# Patient Record
Sex: FEMALE | Race: WHITE | NOT HISPANIC OR LATINO | Employment: OTHER | URBAN - METROPOLITAN AREA
[De-identification: names, ages, dates, MRNs, and addresses within clinical notes are randomized per-mention and may not be internally consistent; named-entity substitution may affect disease eponyms.]

---

## 2017-01-05 DIAGNOSIS — E83.42 HYPOMAGNESEMIA: ICD-10-CM

## 2017-01-05 RX ORDER — LANOLIN ALCOHOL/MO/W.PET/CERES
400 CREAM (GRAM) TOPICAL 2 TIMES DAILY
Qty: 60 TABLET | Refills: 11 | Status: SHIPPED | OUTPATIENT
Start: 2017-01-05 | End: 2018-01-05

## 2017-01-05 NOTE — TELEPHONE ENCOUNTER
----- Message from Sharri Cabral MD sent at 1/4/2017  7:49 PM CST -----  Results reviewed and the following message sent to patient via [facility]: Your magnesium level is low; I recommend you restart magnesium oxide 400 mg twice daily. Your kidney #s are good. Note that fasting sugars are running over 100. They do not meet diagnosis of diabetes, but you are getting close-I recommend you see your PCP and have hemoglobin A1C followed, plus get some diet/weight loss & exercise instructions to avoid becoming diabetic.  I'll copy Dr. Fuchs so he is aware of these labs and issues.

## 2017-01-10 DIAGNOSIS — Z94.0 KIDNEY REPLACED BY TRANSPLANT: ICD-10-CM

## 2017-01-10 RX ORDER — MYCOPHENOLIC ACID 180 MG/1
180 TABLET, DELAYED RELEASE ORAL 2 TIMES DAILY
Qty: 180 TABLET | Refills: 3 | Status: SHIPPED | OUTPATIENT
Start: 2017-01-10 | End: 2017-01-13 | Stop reason: SDUPTHER

## 2017-01-10 RX ORDER — TACROLIMUS 1 MG/1
1 CAPSULE ORAL EVERY 12 HOURS
Qty: 180 CAPSULE | Refills: 3 | Status: SHIPPED | OUTPATIENT
Start: 2017-01-10 | End: 2017-01-13 | Stop reason: SDUPTHER

## 2017-01-13 ENCOUNTER — OFFICE VISIT (OUTPATIENT)
Dept: TRANSPLANT | Facility: CLINIC | Age: 62
End: 2017-01-13
Payer: MEDICARE

## 2017-01-13 VITALS
BODY MASS INDEX: 37.42 KG/M2 | OXYGEN SATURATION: 97 % | TEMPERATURE: 98 F | SYSTOLIC BLOOD PRESSURE: 130 MMHG | HEIGHT: 59 IN | DIASTOLIC BLOOD PRESSURE: 80 MMHG | HEART RATE: 80 BPM | RESPIRATION RATE: 16 BRPM | WEIGHT: 185.63 LBS

## 2017-01-13 DIAGNOSIS — Z94.0 STATUS POST DECEASED-DONOR KIDNEY TRANSPLANTATION: Chronic | ICD-10-CM

## 2017-01-13 DIAGNOSIS — I12.9 RENAL HYPERTENSION, STAGE 1-4 OR UNSPECIFIED CHRONIC KIDNEY DISEASE: Chronic | ICD-10-CM

## 2017-01-13 DIAGNOSIS — N25.81 SECONDARY HYPERPARATHYROIDISM OF RENAL ORIGIN: Chronic | ICD-10-CM

## 2017-01-13 DIAGNOSIS — N18.30 CKD (CHRONIC KIDNEY DISEASE) STAGE 3, GFR 30-59 ML/MIN: Primary | Chronic | ICD-10-CM

## 2017-01-13 DIAGNOSIS — Z29.89 NEED FOR PROPHYLACTIC IMMUNOTHERAPY: Chronic | ICD-10-CM

## 2017-01-13 DIAGNOSIS — E83.42 HYPOMAGNESEMIA: Chronic | ICD-10-CM

## 2017-01-13 DIAGNOSIS — K52.9 CHRONIC DIARRHEA: Chronic | ICD-10-CM

## 2017-01-13 DIAGNOSIS — Z94.0 KIDNEY REPLACED BY TRANSPLANT: ICD-10-CM

## 2017-01-13 PROCEDURE — 97802 MEDICAL NUTRITION INDIV IN: CPT | Mod: PBBFAC | Performed by: DIETITIAN, REGISTERED

## 2017-01-13 PROCEDURE — 99999 PR PBB SHADOW E&M-EST. PATIENT-LVL III: CPT | Mod: PBBFAC,,, | Performed by: INTERNAL MEDICINE

## 2017-01-13 PROCEDURE — 99213 OFFICE O/P EST LOW 20 MIN: CPT | Mod: PBBFAC | Performed by: INTERNAL MEDICINE

## 2017-01-13 PROCEDURE — 99215 OFFICE O/P EST HI 40 MIN: CPT | Mod: S$PBB,,, | Performed by: INTERNAL MEDICINE

## 2017-01-13 RX ORDER — MYCOPHENOLIC ACID 180 MG/1
180 TABLET, DELAYED RELEASE ORAL 2 TIMES DAILY
Qty: 60 TABLET | Refills: 11 | Status: SHIPPED | OUTPATIENT
Start: 2017-01-13 | End: 2017-01-17 | Stop reason: SDUPTHER

## 2017-01-13 RX ORDER — TACROLIMUS 1 MG/1
1 CAPSULE ORAL EVERY 12 HOURS
Qty: 90 CAPSULE | Refills: 11 | Status: SHIPPED | OUTPATIENT
Start: 2017-01-13 | End: 2017-05-12 | Stop reason: SDUPTHER

## 2017-01-13 NOTE — PROGRESS NOTES
Kidney Post-Transplant Assessment    Referring Physician: Nicolas Bustillo  Current Nephrologist: Nicolas Bustillo    ORGAN: RIGHT KIDNEY  Donor Type:  - brain death  PHS Increased Risk: no  Cold Ischemia: 348 mins  Induction Medications: campath - alemtuzumab (anti-cd52)    Subjective:     CC:  Reassessment of renal allograft function and management of chronic immunosuppression.    HPI:  Ms. Vicente is a 61 y.o. year old White female who received a  - brain death kidney transplant on 1/16/15.  She has CKD stage 3 - GFR 30-59 and her baseline creatinine is between 1.2-1.7. She takes mycophenolic acid and tacrolimus for maintenance immunosuppression. She denies any recent hospitalizations or ER visits since her previous clinic visit.    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 She also chronic diarrhea for which she takes Imodium every few days with good results.     Claudette is concerned about her weight gainAnd foot pain from bone spurs which is significantly limiting her activity.  She was recently seen by podiatry who prescribedsome by mouth steroids for several days.  She is questioning if she can have a steroid injection, which I told her was fine  She is concerned about the fact that she has been recently on steroids and not walking much, and has been off vitamin D She tolerated the supplement well, although she has a vague history of ALLERGY to ergocalciferol predating her transplant. She recently saw Dr. MAURIZIO Fuchs to establish nephrology care near home.     She denies any kidney-related complaints, such as pain over allograft, flank pain, dysuria, frequency, or other LUTS.     Review of Systems   Constitutional: Positive for unexpected weight change (from inactivity associated with painful feet). Negative for fever.   HENT: Negative for mouth sores.    Eyes: Negative for visual disturbance.   Respiratory: Negative for shortness of breath.    Cardiovascular: Negative for chest pain and  "leg swelling.   Gastrointestinal: Negative.    Genitourinary: Negative for decreased urine volume, difficulty urinating, dysuria and hematuria.   Musculoskeletal: Positive for gait problem (Related to foot pain).   Skin: Negative for rash.   Allergic/Immunologic: Positive for immunocompromised state.   Neurological: Negative for tremors.     Objective:     Blood pressure 130/80, pulse 80, temperature 98 °F (36.7 °C), temperature source Oral, resp. rate 16, height 4' 10.5" (1.486 m), weight 84.2 kg (185 lb 10 oz), SpO2 97 %.body mass index is 38.14 kg/(m^2).    Physical Exam  A&O x3, NAD.  No icterus, conjuctiva and lids normal w/o injection.  Skin warm and dry to touch.  No rashes noted on exposed skin.  Lungs CTA, unlabored.  Heart regular, no rub.  Abdomen soft, nontender, no masses.  Extremities w/o cyanosis or edema.     Labs:  Lab Results   Component Value Date    WBC 12.00 2017    HGB 12.3 2017    HCT 37.5 2017     2017    K 4.4 2017     2017    CO2 26 2017    BUN 22 2017    CREATININE 1.1 2017    EGFRNONAA 53.6 (A) 2017    CALCIUM 9.2 2017    PHOS 3.9 2017    MG 1.3 (L) 2017    ALBUMIN 3.9 2017    ALBUMIN 3.9 2017    AST 20 2017    ALT 19 2017    UTPCR 0.05 2017    .0 (H) 2016    TACROLIMUS 6.8 2017   Labs were reviewed with the patient.    Assessment:     1. CKD (chronic kidney disease) stage 3, GFR 30-59 ml/min    2. Status post -donor kidney transplant for PKD - 1/16/15    3. Chronic immunosuppression with Prograf and MMF    4. Renal hypertension, stage 1-4 or unspecified chronic kidney disease    5. Secondary hyperparathyroidism of renal origin    6. Chronic diarrhea    7. Hypomagnesemia    8. Kidney replaced by transplant      Plan:     Allograft function assessment   Lab Results   Component Value Date    CREATININE 1.1 2017   CKD3 s/p kidney " transplantation. Continue to monitor renal function and electrolytes, HTN, secondary hyperparathyroidism and other issues related to underlying ESRD.     Immunosuppression evaluation  Continue tacrolimus-based immunosuppression.  Will continue to watch signs, symptoms and levels for side effects and drug toxicity. She shall continue Myfortic without change as well.    Evaluation for bone marrow suppression (r/t immunosuppression toxicity or infection)- Mild leukocytosis noted compared to previous labs, likely from the recent steroids.  No evidence of infection.  Other counts are very stable and require no intervention either.  Lab Results   Component Value Date    WBC 12.00 01/04/2017    HGB 12.3 01/04/2017    HCT 37.5 01/04/2017     01/04/2017     Renal hypertension  BP management within target range. No med changes required.    Metabolic bone disease [Secondary hyperparathyroidism, Phosphorus metabolism disorders]  Lab Results   Component Value Date    .0 (H) 06/08/2016    CALCIUM 9.2 01/04/2017    PHOS 3.9 01/04/2017   Labs are very reasonable, being followed by Dr. Fuchs as well    Assessment of electrolytes  Lab Results   Component Value Date     01/04/2017    K 4.4 01/04/2017    CO2 26 01/04/2017     01/04/2017    MG 1.3 (L) 01/04/2017   Hypomagnesemia noted since stopping magnesium supplement, she hasnow restarted.    Screening for BK infection to prevent allograft dysfunction - Remains undetectable  Lab Results   Component Value Date    BKVIRUSDNAUR Not detected 10/05/2015    BKQUANTURINE <250 10/05/2015    BKVIRUSLOG <2.40 10/05/2015    BKVIRUSURINE Urine 02/16/2015    BKVIRUSPCRQB <250 01/04/2017   Continue blood PCR screening as per program guidelines to prevent BK viremia and nephropathy leading to allograft dysfunction and potential graft failure    Screening for proteinuria - Negligible, continue to watch  Lab Results   Component Value Date    PROTEINURINE 8 01/04/2017     CREATRANDUR 164.5 01/04/2017    UTPCR 0.05 01/04/2017       Some mild hyperglycemia noted likely related to her inactivity resulting in weight gain.  This requires close monitoring.     Follow-up:   Clinic: return to transplant clinic weekly for the first month after transplant; every 2 weeks during months 2-3; then at 6-, 9-, 12-, 18-, 24-, and 36- months post-transplant to reassess for complications from immunosuppression toxicity and monitor for rejection.  Annually thereafter.    Labs: since patient remains at high risk for rejection and drug-related complications that warrant close monitoring, labs will be ordered as follows: continue twice weekly CBC, renal panel, and drug level for first month; then same labs once weekly through 3rd month post-transplant.  Urine for UA and protein/creatinine ratio monthly.  Urine BK - PCR at 1-, 3-, 6-, 9-, 12-, 18-, 24-, and 36- months post-transplant.  Hepatic panel at 1-, 2-, 3-, 6-, 9-, 12-, 18-, 24-, and 36- months post-transplant.    Sharri Cabral MD       Education:   Material provided to the patient.  Patient reminded to call with any health changes since these can be early signs of significant complications.  Also, I advised the patient to be sure any new medications or changes of old medications are discussed with either a pharmacist or physician knowledgeable with transplant to avoid rejection/drug toxicity related to significant drug interactions.    UNOS Patient Status  Functional Status: 60% - Requires occasional assistance but is able to care for needs  Physical Capacity: Limited Mobility

## 2017-01-13 NOTE — Clinical Note
Romulo, I did not see mildly elevated glucose readings until after she left, but suspect this is related to her weight gain and recent steroid use.  Please ask her to follow-up with the primary care physician.   Will cc Dr. Fuchs as KARAN

## 2017-01-13 NOTE — LETTER
January 13, 2017        Nicolas Bustillo  [address] [facility] [address]  Phone: [phone]  Fax: [phone]             [facility]  [address]  [facility] [address]  Phone: [phone]   Patient: Claudette L Voss   MR Number: [id number]   YOB: 1955   Date of Visit: 1/13/2017       Dear Dr. Nicolas Bustillo    Thank you for referring Claudette L Voss to me for evaluation. Attached you will find relevant portions of my assessment and plan of care.    If you have questions, please do not hesitate to call me. I look forward to following Claudette L Voss along with you.    Sincerely,    Sharri Cabral MD    Enclosure    If you would like to receive this communication electronically, please contact [email or web address] or [phone] to request [employer] access.    [employer] is a tool which provides read-only access to select patient information with whom you have a relationship. Its easy to use and provides real time access to review your patients record including encounter summaries, notes, results, and demographic information.    If you feel you have received this communication in error or would no longer like to receive these types of communications, please e-mail [email or web address]

## 2017-01-13 NOTE — PROGRESS NOTES
REFERRING PROVIDER: Sharri Sheridan MD    REASON FOR VIST: Low magnesium level    PAST MEDICAL HX: s/p KTx 1/16/15    LABS: 1/4/17 mag 1.3    NUTRITION HX/INTERVENTION:   -Magnesium content of food list provided & reviewed w/ pt; encouraged to increase po intake of high magnesium foods and continue to monitor levels.      Patient voiced understanding of education & goals. Contact information was provided & will f/u as needed at clinic visits.     Consultation Time: 15 minutes

## 2017-01-17 DIAGNOSIS — Z94.0 KIDNEY REPLACED BY TRANSPLANT: ICD-10-CM

## 2017-01-17 RX ORDER — MYCOPHENOLIC ACID 180 MG/1
180 TABLET, DELAYED RELEASE ORAL 2 TIMES DAILY
Qty: 60 TABLET | Refills: 11 | Status: SHIPPED | OUTPATIENT
Start: 2017-01-17 | End: 2017-05-12 | Stop reason: SDUPTHER

## 2017-05-12 DIAGNOSIS — Z94.0 KIDNEY REPLACED BY TRANSPLANT: ICD-10-CM

## 2017-05-12 RX ORDER — TACROLIMUS 1 MG/1
1 CAPSULE ORAL EVERY 12 HOURS
Qty: 90 CAPSULE | Refills: 11 | Status: SHIPPED | OUTPATIENT
Start: 2017-05-12 | End: 2018-01-12 | Stop reason: SDUPTHER

## 2017-05-12 RX ORDER — MYCOPHENOLIC ACID 180 MG/1
180 TABLET, DELAYED RELEASE ORAL 2 TIMES DAILY
Qty: 60 TABLET | Refills: 11 | Status: SHIPPED | OUTPATIENT
Start: 2017-05-12 | End: 2018-01-12 | Stop reason: SDUPTHER

## 2017-12-05 DIAGNOSIS — Z94.0 KIDNEY REPLACED BY TRANSPLANT: Primary | ICD-10-CM

## 2018-01-12 ENCOUNTER — OFFICE VISIT (OUTPATIENT)
Dept: TRANSPLANT | Facility: CLINIC | Age: 63
End: 2018-01-12
Payer: MEDICARE

## 2018-01-12 VITALS
BODY MASS INDEX: 37.18 KG/M2 | RESPIRATION RATE: 17 BRPM | HEART RATE: 67 BPM | OXYGEN SATURATION: 100 % | DIASTOLIC BLOOD PRESSURE: 73 MMHG | WEIGHT: 184.06 LBS | TEMPERATURE: 98 F | SYSTOLIC BLOOD PRESSURE: 149 MMHG

## 2018-01-12 DIAGNOSIS — N18.30 CKD (CHRONIC KIDNEY DISEASE) STAGE 3, GFR 30-59 ML/MIN: Primary | Chronic | ICD-10-CM

## 2018-01-12 DIAGNOSIS — E83.52 HYPERCALCEMIA: ICD-10-CM

## 2018-01-12 DIAGNOSIS — Z79.899 IMMUNOSUPPRESSIVE MANAGEMENT ENCOUNTER FOLLOWING KIDNEY TRANSPLANT: ICD-10-CM

## 2018-01-12 DIAGNOSIS — Z94.0 IMMUNOSUPPRESSIVE MANAGEMENT ENCOUNTER FOLLOWING KIDNEY TRANSPLANT: ICD-10-CM

## 2018-01-12 DIAGNOSIS — I12.9 RENAL HYPERTENSION: Chronic | ICD-10-CM

## 2018-01-12 DIAGNOSIS — Z29.89 NEED FOR PROPHYLACTIC IMMUNOTHERAPY: Chronic | ICD-10-CM

## 2018-01-12 DIAGNOSIS — Z94.0 KIDNEY REPLACED BY TRANSPLANT: ICD-10-CM

## 2018-01-12 DIAGNOSIS — Z94.0 STATUS POST DECEASED-DONOR KIDNEY TRANSPLANTATION: Chronic | ICD-10-CM

## 2018-01-12 PROCEDURE — 99999 PR PBB SHADOW E&M-EST. PATIENT-LVL III: CPT | Mod: PBBFAC,,, | Performed by: INTERNAL MEDICINE

## 2018-01-12 PROCEDURE — 99214 OFFICE O/P EST MOD 30 MIN: CPT | Mod: S$PBB,,, | Performed by: INTERNAL MEDICINE

## 2018-01-12 PROCEDURE — 99213 OFFICE O/P EST LOW 20 MIN: CPT | Mod: PBBFAC | Performed by: INTERNAL MEDICINE

## 2018-01-12 RX ORDER — LANOLIN ALCOHOL/MO/W.PET/CERES
400 CREAM (GRAM) TOPICAL 2 TIMES DAILY
COMMUNITY
End: 2018-01-29 | Stop reason: SDUPTHER

## 2018-01-12 RX ORDER — TACROLIMUS 0.5 MG/1
CAPSULE ORAL
Qty: 90 CAPSULE | Refills: 11 | Status: SHIPPED | OUTPATIENT
Start: 2018-01-12 | End: 2018-01-12 | Stop reason: SDUPTHER

## 2018-01-12 RX ORDER — MYCOPHENOLIC ACID 180 MG/1
180 TABLET, DELAYED RELEASE ORAL 2 TIMES DAILY
Qty: 60 TABLET | Refills: 11 | Status: SHIPPED | OUTPATIENT
Start: 2018-01-12 | End: 2019-01-14 | Stop reason: SDUPTHER

## 2018-01-12 RX ORDER — CHOLECALCIFEROL (VITAMIN D3) 25 MCG
1000 TABLET ORAL 2 TIMES DAILY
COMMUNITY
End: 2021-08-19

## 2018-01-12 RX ORDER — TACROLIMUS 0.5 MG/1
CAPSULE ORAL
Qty: 90 CAPSULE | Refills: 11 | Status: SHIPPED | OUTPATIENT
Start: 2018-01-12 | End: 2018-04-19 | Stop reason: SDUPTHER

## 2018-01-12 NOTE — LETTER
January 12, 2018        Nicolas Bustillo  [address] [facility] [address]  Phone: [phone]  Fax: [phone]             [facility]  [address]  [facility] [address]  Phone: [phone]   Patient: Claudette Louise Voss   MR Number: [id number]   YOB: 1955   Date of Visit: 1/12/2018       Dear Dr. Nicolas Bustillo    Thank you for referring Claudette Voss to me for evaluation. Attached you will find relevant portions of my assessment and plan of care.    If you have questions, please do not hesitate to call me. I look forward to following Claudette Voss along with you.    Sincerely,    Sharri Cabral MD    Enclosure    If you would like to receive this communication electronically, please contact [email or web address] or [phone] to request [employer] access.    [employer] is a tool which provides read-only access to select patient information with whom you have a relationship. Its easy to use and provides real time access to review your patients record including encounter summaries, notes, results, and demographic information.    If you feel you have received this communication in error or would no longer like to receive these types of communications, please e-mail [email or web address]

## 2018-01-12 NOTE — PROGRESS NOTES
Kidney Post-Transplant Assessment    Referring Physician: Nicolas Bustillo  Current Nephrologist: Nicolas Bustillo    ORGAN: RIGHT KIDNEY  Donor Type:  - brain death  PHS Increased Risk: no  Cold Ischemia: 348 mins  Induction Medications: campath - alemtuzumab (anti-cd52)    Subjective:     CC:  Reassessment of renal allograft function and management of chronic immunosuppression.    HPI:  Ms. Vicente is a 62 y.o. year old White female who received a  - brain death kidney transplant on 1/16/15.  She has CKD stage 3 - GFR 30-59 and her baseline creatinine is between 1.0-1.3 recently, previously 1.2-1.7. She takes mycophenolic acid and tacrolimus for maintenance immunosuppression. She denies any recent hospitalizations or ER visits since her previous clinic visit.    Pertinent History:   -ESRD from PKD, DDKT.   -Chronic diarrhea for which she takes Imodium every few days with good results.   -Remote breast cancer 2004    Claudette denies any kidney-related complaints, such as pain over allograft, flank pain, dysuria, frequency, or other LUTS. She has been feeling fine and had no changes in health.  For her sec hyperparathyroidism- She notes Dr. Fuchs changed her cinacalceet for OTC vit D 1000 u daily d/t cost.  HTN- she reports readings <140/90    Review of Systems   Constitutional: Negative for fever.   Eyes: Negative for visual disturbance.   Respiratory: Negative for shortness of breath.    Cardiovascular: Negative for chest pain and leg swelling.   Gastrointestinal: Negative.    Genitourinary: Negative for decreased urine volume and difficulty urinating.   Skin: Negative for rash.   Allergic/Immunologic: Positive for immunocompromised state.   Neurological: Negative for tremors.     Objective:     Blood pressure (!) 149/73, pulse 67, temperature 97.8 °F (36.6 °C), temperature source Oral, resp. rate 17, weight 83.5 kg (184 lb 1.4 oz), SpO2 100 %.body mass index is 37.18 kg/m².    Physical Exam    Constitutional: No distress.   Cardiovascular: Normal rate and regular rhythm.    Pulmonary/Chest: Effort normal and breath sounds normal. No respiratory distress.   Abdominal: Soft. Bowel sounds are normal. She exhibits no mass. There is no tenderness.   Musculoskeletal: She exhibits no edema.   Psychiatric: She has a normal mood and affect.      Labs:  Lab Results   Component Value Date    WBC 10.30 2018    HGB 12.7 2018    HCT 39.1 2018     2018    K 4.8 2018     2018    CO2 25 2018    BUN 25 (H) 2018    CREATININE 1.1 2018    EGFRNONAA 53.2 (A) 2018    CALCIUM 10.7 (H) 2018    PHOS 3.2 2018    MG 1.6 2018    ALBUMIN 3.9 2018    ALBUMIN 3.9 2018    AST 21 2018    ALT 17 2018    UTPCR 0.07 2018    .0 (H) 2016    TACROLIMUS 6.4 2018   Labs were reviewed with the patient.    Assessment:     1. CKD (chronic kidney disease) stage 3, GFR 30-59 ml/min    2. Status post -donor kidney transplant for PKD - 1/16/15    3. Chronic immunosuppression with Prograf and MMF    4. Hypercalcemia    5. Renal hypertension    6. Immunosuppressive management encounter following kidney transplant      Plan:     Allograft function assessment   Lab Results   Component Value Date    CREATININE 1.1 2018   CKD3 s/p kidney transplantation remains very stable. Continue to monitor renal function and electrolytes, HTN, secondary hyperparathyroidism and other issues related to underlying ESRD.     Immunosuppression evaluation  Continue tacrolimus-based immunosuppression; tacro level a tad high; will decrease dose to 1 mg in AM and 0.5 mg in PM.  Will continue to watch signs, symptoms and levels for side effects and drug toxicity. She shall continue Myfortic without change as well. Refill for MPA and new rx for tacro given.    Renal hypertension  BP a bit high, but being watched by Dr. Fuchs  &  PCP    Metabolic bone disease [Secondary hyperparathyroidism, Phosphorus metabolism disorders]  Lab Results   Component Value Date    .0 (H) 06/08/2016    CALCIUM 10.7 (H) 01/02/2018    PHOS 3.2 01/02/2018   Being followed by Dr. Fuchs, he is monitoring hypercalcemia    -Monitoring for proteinuria and BK -  Most recent results show:   Lab Results   Component Value Date    BKVIRUSPCRQB <125 01/02/2018    BKVIRUSPCRQB <250 01/04/2017    UTPCR 0.07 01/02/2018    UTPCR 0.06 10/23/2017   -Both BK PCR and urine p/c ratio are acceptable  -Continue monitoring as per program guidelines since BK infection & proteinuria are harbingers of possible allograft dysfunction/failure.       She had flu vaccine. I reviewed she needs to call asap if develops flu symptoms.  RTC 1 year    Follow-up:   Clinic: return to transplant clinic weekly for the first month after transplant; every 2 weeks during months 2-3; then at 6-, 9-, 12-, 18-, 24-, and 36- months post-transplant to reassess for complications from immunosuppression toxicity and monitor for rejection.  Annually thereafter.    Labs: since patient remains at high risk for rejection and drug-related complications that warrant close monitoring, labs will be ordered as follows: continue twice weekly CBC, renal panel, and drug level for first month; then same labs once weekly through 3rd month post-transplant.  Urine for UA and protein/creatinine ratio monthly.  Urine BK - PCR at 1-, 3-, 6-, 9-, 12-, 18-, 24-, and 36- months post-transplant.  Hepatic panel at 1-, 2-, 3-, 6-, 9-, 12-, 18-, 24-, and 36- months post-transplant.    Sharri Cabral MD       Education:   Material provided to the patient.  Patient reminded to call with any health changes since these can be early signs of significant complications.  Also, I advised the patient to be sure any new medications or changes of old medications are discussed with either a pharmacist or physician knowledgeable with  transplant to avoid rejection/drug toxicity related to significant drug interactions.    UNOS Patient Status  Functional Status: 60% - Requires occasional assistance but is able to care for needs  Physical Capacity: Limited Mobility

## 2018-01-12 NOTE — PATIENT INSTRUCTIONS
Thank you for entrusting your transplant care to us, and visiting me today.  Please feel free to ask any questions and raise any concerns regarding your health care.  Warmest Regards,  Dr. Lyubov Cabral

## 2018-01-21 DIAGNOSIS — E83.42 HYPOMAGNESEMIA: ICD-10-CM

## 2018-01-23 RX ORDER — LANOLIN ALCOHOL/MO/W.PET/CERES
CREAM (GRAM) TOPICAL
Qty: 120 TABLET | Refills: 0 | OUTPATIENT
Start: 2018-01-23

## 2018-01-29 RX ORDER — LANOLIN ALCOHOL/MO/W.PET/CERES
400 CREAM (GRAM) TOPICAL 2 TIMES DAILY
Qty: 60 TABLET | Refills: 11 | Status: SHIPPED | OUTPATIENT
Start: 2018-01-29 | End: 2019-01-29

## 2018-04-18 DIAGNOSIS — Z94.0 KIDNEY REPLACED BY TRANSPLANT: ICD-10-CM

## 2018-04-18 NOTE — TELEPHONE ENCOUNTER
Pt requested to take 1 mg cap in am and 0.5 mg cap pm instead of 2 0.5 am and 1 0.5 pm. I sent to Dr Leroy for signature.

## 2018-04-18 NOTE — TELEPHONE ENCOUNTER
----- Message from Kayla Crowe sent at 4/18/2018  4:18 PM CDT -----  Contact: pt  Calling to speak with coordinator regarding a change in her medication tacrolimus (PROGRAF) 0.5 MG Cap    pls call to advise pt    Pt contact [phone]

## 2018-04-19 RX ORDER — TACROLIMUS 1 MG/1
1 CAPSULE ORAL DAILY
Qty: 30 CAPSULE | Refills: 11 | Status: SHIPPED | OUTPATIENT
Start: 2018-04-19 | End: 2019-01-14

## 2018-04-19 RX ORDER — TACROLIMUS 0.5 MG/1
0.5 CAPSULE ORAL DAILY
Qty: 30 CAPSULE | Refills: 11 | Status: SHIPPED | OUTPATIENT
Start: 2018-04-19 | End: 2019-01-14 | Stop reason: DRUGHIGH

## 2018-12-14 DIAGNOSIS — Z94.0 KIDNEY REPLACED BY TRANSPLANT: Primary | ICD-10-CM

## 2019-01-11 ENCOUNTER — PATIENT MESSAGE (OUTPATIENT)
Dept: TRANSPLANT | Facility: CLINIC | Age: 64
End: 2019-01-11

## 2019-01-14 ENCOUNTER — OFFICE VISIT (OUTPATIENT)
Dept: TRANSPLANT | Facility: CLINIC | Age: 64
End: 2019-01-14
Payer: MEDICARE

## 2019-01-14 VITALS
BODY MASS INDEX: 34.63 KG/M2 | DIASTOLIC BLOOD PRESSURE: 53 MMHG | WEIGHT: 176.38 LBS | HEART RATE: 58 BPM | SYSTOLIC BLOOD PRESSURE: 112 MMHG | OXYGEN SATURATION: 97 % | RESPIRATION RATE: 16 BRPM | TEMPERATURE: 98 F | HEIGHT: 60 IN

## 2019-01-14 DIAGNOSIS — Z94.0 KIDNEY REPLACED BY TRANSPLANT: ICD-10-CM

## 2019-01-14 DIAGNOSIS — Z29.89 NEED FOR PROPHYLACTIC IMMUNOTHERAPY: Chronic | ICD-10-CM

## 2019-01-14 DIAGNOSIS — N18.30 CKD (CHRONIC KIDNEY DISEASE) STAGE 3, GFR 30-59 ML/MIN: Chronic | ICD-10-CM

## 2019-01-14 DIAGNOSIS — Q61.3 PKD (POLYCYSTIC KIDNEY DISEASE): ICD-10-CM

## 2019-01-14 DIAGNOSIS — E83.42 HYPOMAGNESEMIA: Chronic | ICD-10-CM

## 2019-01-14 DIAGNOSIS — I12.9 RENAL HYPERTENSION: Chronic | ICD-10-CM

## 2019-01-14 DIAGNOSIS — N25.81 SECONDARY HYPERPARATHYROIDISM OF RENAL ORIGIN: Chronic | ICD-10-CM

## 2019-01-14 DIAGNOSIS — Z94.0 STATUS POST DECEASED-DONOR KIDNEY TRANSPLANTATION: Primary | Chronic | ICD-10-CM

## 2019-01-14 PROCEDURE — 99215 PR OFFICE/OUTPT VISIT, EST, LEVL V, 40-54 MIN: ICD-10-PCS | Mod: S$PBB,,, | Performed by: NURSE PRACTITIONER

## 2019-01-14 PROCEDURE — 99215 OFFICE O/P EST HI 40 MIN: CPT | Mod: PBBFAC | Performed by: NURSE PRACTITIONER

## 2019-01-14 PROCEDURE — 99999 PR PBB SHADOW E&M-EST. PATIENT-LVL V: CPT | Mod: PBBFAC,,, | Performed by: NURSE PRACTITIONER

## 2019-01-14 PROCEDURE — 99215 OFFICE O/P EST HI 40 MIN: CPT | Mod: S$PBB,,, | Performed by: NURSE PRACTITIONER

## 2019-01-14 PROCEDURE — 99999 PR PBB SHADOW E&M-EST. PATIENT-LVL V: ICD-10-PCS | Mod: PBBFAC,,, | Performed by: NURSE PRACTITIONER

## 2019-01-14 RX ORDER — MYCOPHENOLIC ACID 180 MG/1
180 TABLET, DELAYED RELEASE ORAL 2 TIMES DAILY
Qty: 60 TABLET | Refills: 11 | Status: SHIPPED | OUTPATIENT
Start: 2019-01-14 | End: 2020-01-15

## 2019-01-14 RX ORDER — TACROLIMUS 0.5 MG/1
0.5 CAPSULE ORAL EVERY 12 HOURS
Qty: 60 CAPSULE | Refills: 11 | Status: SHIPPED | OUTPATIENT
Start: 2019-01-14 | End: 2019-01-14 | Stop reason: DRUGHIGH

## 2019-01-14 RX ORDER — TACROLIMUS 1 MG/1
1 CAPSULE ORAL EVERY 12 HOURS
Qty: 60 CAPSULE | Refills: 11 | Status: SHIPPED | OUTPATIENT
Start: 2019-01-14 | End: 2020-01-15

## 2019-01-14 NOTE — LETTER
January 16, 2019        Felix Fuchs  [address]  [address]  [place] [address]  Phone: [phone]  Fax: [phone]             [facility]  [address]  [facility] [address]  Phone: [phone]   Patient: Claudette Louise Voss   MR Number: [id number]   YOB: 1955   Date of Visit: 1/14/2019       Dear Dr. Felix Fuchs    Thank you for referring Claudette Voss to me for evaluation. Attached you will find relevant portions of my assessment and plan of care.    If you have questions, please do not hesitate to call me. I look forward to following Claudette Voss along with you.    Sincerely,    Vanesa Antoine, NP    Enclosure    If you would like to receive this communication electronically, please contact [email or web address] or [phone] to request [employer] Link access.    [employer] Link is a tool which provides read-only access to select patient information with whom you have a relationship. Its easy to use and provides real time access to review your patients record including encounter summaries, notes, results, and demographic information.    If you feel you have received this communication in error or would no longer like to receive these types of communications, please e-mail [email or web address]

## 2019-01-14 NOTE — PROGRESS NOTES
Kidney Post-Transplant Assessment    Referring Physician: Nicolas Bustillo  Current Nephrologist: Felix Fuchs    ORGAN: RIGHT KIDNEY  Donor Type:  - brain death  PHS Increased Risk: no  Cold Ischemia: 348 mins  Induction Medications: campath - alemtuzumab (anti-cd52)    Subjective:     CC:  Reassessment of renal allograft function and management of chronic immunosuppression.    HPI:  Ms. Vicente is a 63 y.o. year old White female who received a  - brain death kidney transplant on 1/16/15.  She has CKD stage 3 - GFR 30-59 and her baseline creatinine is between 1.0-1.2. She takes mycophenolic acid and tacrolimus for maintenance immunosuppression. She denies any recent hospitalizations or ER visits since her previous clinic visit.    Overall feels well. No health concerns today.   Denies chest pain, SOB, leg pain, abdominal pain or LUTs.  HX BRCA. F[place] [facility] [place]  and [place] MMG   Has been dieting and trying to lose weight. Has lost 8 lbs since last visit   Had annual flu vaccine    Past Medical History:   Diagnosis Date    Breast cancer - 2013    Chronic immunosuppression with Prograf and MMF 2015    CKD (chronic kidney disease) stage 3, GFR 30-59 ml/min     Colon polyps 2013    ESRD (end stage renal disease) 2013    GERD (gastroesophageal reflux disease) 2013    Glaucoma 2013    Hyperlipidemia 2013    Hypertension 2013    Hypothyroidism 2013    Malignant neoplasm of upper-outer quadrant of left female breast 2004    PKD (polycystic kidney disease) - S/P nephrectomies 2013    Renal hypertension     Secondary hyperparathyroidism, renal 2013       Review of Systems   Constitutional: Negative for activity change, appetite change, chills, fatigue, fever and unexpected weight change.   HENT: Negative for congestion, facial swelling, postnasal drip, rhinorrhea, sinus pressure, sore throat and  trouble swallowing.    Eyes: Negative for pain, redness and visual disturbance.   Respiratory: Negative for cough, chest tightness, shortness of breath and wheezing.    Cardiovascular: Negative.  Negative for chest pain, palpitations and leg swelling.   Gastrointestinal: Negative for abdominal pain, diarrhea, nausea and vomiting.   Genitourinary: Negative for dysuria, flank pain and urgency.   Musculoskeletal: Negative for gait problem, neck pain and neck stiffness.   Skin: Negative for rash.   Allergic/Immunologic: Positive for immunocompromised state. Negative for environmental allergies and food allergies.   Neurological: Negative for dizziness, weakness, light-headedness and headaches.   Psychiatric/Behavioral: Negative for agitation and confusion. The patient is not nervous/anxious.        Objective:   Blood pressure (!) 112/53, pulse (!) 58, temperature 98 °F (36.7 °C), temperature source Oral, resp. rate 16, height 5' (1.524 m), weight 80 kg (176 lb 5.9 oz), SpO2 97 %.body mass index is 34.44 kg/m².    Physical Exam   Constitutional: She is oriented to person, place, and time. She appears well-developed and well-nourished.   HENT:   Head: Normocephalic.   Mouth/Throat: Oropharynx is clear and moist. No oropharyngeal exudate.   Eyes: Conjunctivae and EOM are normal. Pupils are equal, round, and reactive to light. No scleral icterus.   Neck: Normal range of motion. Neck supple.   Cardiovascular: Normal rate, regular rhythm and normal heart sounds.   Pulmonary/Chest: Effort normal and breath sounds normal.   Abdominal: Soft. Normal appearance and bowel sounds are normal. She exhibits no distension and no mass. There is no splenomegaly or hepatomegaly. There is no tenderness. There is no rebound, no guarding, no CVA tenderness, no tenderness at McBurney's point and negative Clarke's sign.       Musculoskeletal: Normal range of motion. She exhibits no edema.   Lymphadenopathy:     She has no cervical adenopathy.    Neurological: She is alert and oriented to person, place, and time. She exhibits normal muscle tone. Coordination normal.   Skin: Skin is warm and dry.   Psychiatric: She has a normal mood and affect. Her behavior is normal.   Vitals reviewed.       Labs:  Lab Results   Component Value Date    WBC 10.56 2019    HGB 11.9 (L) 2019    HCT 36.7 (L) 2019     2019    K 4.5 2019     2019    CO2 26 2019    BUN 20 2019    CREATININE 1.1 2019    EGFRNONAA 52.8 (A) 2019    CALCIUM 10.5 2019    PHOS 2.8 2019    MG 1.8 2019    ALBUMIN 3.3 (L) 2019    ALBUMIN 3.3 (L) 2019    AST 17 2019    ALT 17 2019       No results found for: EXTANC, EXTWBC, EXTSEGS, EXTPLATELETS, EXTHEMOGLOBI, EXTHEMATOCRI, EXTCREATININ, EXTSODIUM, EXTPOTASSIUM, EXTBUN, EXTCO2, EXTCALCIUM, EXTPHOSPHORU, EXTGLUCOSE, EXTALBUMIN, EXTAST, EXTALT, EXTBILITOTAL, EXTLIPASE, EXTAMYLASE    No results found for: EXTCYCLOSLVL, EXTSIROLIMUS, EXTTACROLVL, EXTPROTCRE, EXTPTHINTACT, EXTPROTEINUA, EXTWBCUA, EXTRBCUA    Labs were reviewed with the patient.    Assessment:     1. Status post -donor kidney transplant for PKD - 1/16/15    2. Secondary hyperparathyroidism of renal origin    3. Renal hypertension    4. PKD (polycystic kidney disease) - S/P nephrectomies    5. Hypomagnesemia    6. CKD (chronic kidney disease) stage 3, GFR 30-59 ml/min    7. Chronic immunosuppression with Prograf and MMF        Plan:    increased  Prograf /  Repeat trough in  1 week     Follow-up:   1. CKD stage: 3  stable    2. Immunosuppression:   Prograf trough 3.7, which is  Sub therapeutic target 4-7.  Dose increased to  Prograf , Myfortic 180 Mg BID, and Prednisone 5 mg QD. Will continue to monitor for drug toxicities    3. Allograft Function: Stable. Continue good po hydration.   Lab Results   Component Value Date    CREATININE 1.1 2019  3yr 11mo    eGFR if non African American >60 mL/min/1.73 m^2 52.8 (A)  Calculation used to obtain the estimated glomerular filtration  rate (eGFR) is the CKD-EPI equation.         4. Hypertension management: advise low salt diet and home BP monitoring    Isorsobide 30 mg QD, Nifedipine 30 mg QD , Coreg 6.25 mg BID       5. Metabolic Bone Disease/Secondary Hyperparathyroidism:stable  Will monitor PTH, CA and Vit D/guidelines,        1/8/2019  3yr 11mo   Magnesium 1.6 - 2.6 mg/dL 1.8     Lab Results   Component Value Date    .1 (H) 09/18/2018    CALCIUM 10.5 01/08/2019    PHOS 2.8 01/08/2019   Mg ox 400 mg BID, Vit D as prescribed -->MGMT deferred to general nephrology      6. Electrolytes:  Will monitor /guidelines  Lab Results   Component Value Date     01/08/2019    K 4.5 01/08/2019     01/08/2019    CO2 26 01/08/2019       7. Anemia: stable. No need for intervention    Will monitor /guidelines  Lab Results   Component Value Date    WBC 10.56 01/08/2019    HGB 11.9 (L) 01/08/2019    HCT 36.7 (L) 01/08/2019    MCV 94 01/08/2019     01/08/2019       8.  Cytopenias: no significant cytopenias will monitor as per our guidelines. Medicine list reviewed including potential causes of drug-induced cytopenias    9.Proteinuria: continue p/c ratio as per guidelines        1/8/2019  3yr 11mo   Prot/Creat Ratio, Ur 0.00 - 0.20 0.05     10. BK virus infection screening:  will continue to monitor/ guidelines      1/8/2019  3yr 11mo   BK Virus DNA, Blood Not detected Not detected       1/8/2019  3yr 11mo   BK Virus DNA PCR, Quant, Blood <125 Copies/mL <125     11. Weight education: provided during the clinic visit   Body mass index is 34.44 kg/m².          12.Patient safety education regarding immunosuppression including prophylaxis posttransplant for CMV, PCP : Education provided about vaccination and prevention of infections            Follow-up:   Annual follow-up with kidney transplant clinic with repeat labs,  including renal function panel with UA, urine protein/creatinine ratio, and drug trough level q3 months.  Patient also reminded to follow-up with general nephrologist.    Vanesa Antoine NP       Education:   Material provided to the patient.  Patient reminded to call with any health changes since these can be early signs of significant complications.  Also, I advised the patient to be sure any new medications or changes of old medications are discussed with either a pharmacist or physician knowledgeable with transplant to avoid rejection/drug toxicity related to significant drug interactions.    UNOS Patient Status  Functional Status: 80% - Normal activity with effort: some symptoms of disease  Physical Capacity: No Limitations

## 2019-01-15 ENCOUNTER — PATIENT MESSAGE (OUTPATIENT)
Dept: TRANSPLANT | Facility: CLINIC | Age: 64
End: 2019-01-15

## 2019-03-06 ENCOUNTER — HOSPITAL ENCOUNTER (OUTPATIENT)
Dept: SLEEP MEDICINE | Facility: HOSPITAL | Age: 64
Discharge: HOME | End: 2019-03-06
Attending: INTERNAL MEDICINE
Payer: MEDICARE

## 2019-03-06 DIAGNOSIS — G47.33 OBSTRUCTIVE SLEEP APNEA (ADULT) (PEDIATRIC): ICD-10-CM

## 2019-03-06 PROCEDURE — 95810 POLYSOM 6/> YRS 4/> PARAM: CPT

## 2019-03-20 ENCOUNTER — HOSPITAL ENCOUNTER (OUTPATIENT)
Dept: SLEEP MEDICINE | Facility: HOSPITAL | Age: 64
Discharge: HOME | End: 2019-03-20
Attending: INTERNAL MEDICINE
Payer: MEDICARE

## 2019-03-20 DIAGNOSIS — G47.33 OBSTRUCTIVE SLEEP APNEA (ADULT) (PEDIATRIC): ICD-10-CM

## 2019-03-20 PROCEDURE — 95811 POLYSOM 6/>YRS CPAP 4/> PARM: CPT

## 2020-01-15 DIAGNOSIS — Z94.0 KIDNEY REPLACED BY TRANSPLANT: ICD-10-CM

## 2020-01-15 RX ORDER — TACROLIMUS 1 MG/1
1 CAPSULE ORAL EVERY 12 HOURS
Qty: 60 CAPSULE | Refills: 11 | Status: SHIPPED | OUTPATIENT
Start: 2020-01-15 | End: 2020-01-16 | Stop reason: SDUPTHER

## 2020-01-15 RX ORDER — MYCOPHENOLIC ACID 180 MG/1
180 TABLET, DELAYED RELEASE ORAL 2 TIMES DAILY
Qty: 60 TABLET | Refills: 1 | Status: SHIPPED | OUTPATIENT
Start: 2020-01-15 | End: 2020-01-16 | Stop reason: SDUPTHER

## 2020-01-16 DIAGNOSIS — Z94.0 KIDNEY REPLACED BY TRANSPLANT: ICD-10-CM

## 2020-01-16 RX ORDER — MYCOPHENOLIC ACID 180 MG/1
180 TABLET, DELAYED RELEASE ORAL 2 TIMES DAILY
Qty: 60 TABLET | Refills: 11 | Status: SHIPPED | OUTPATIENT
Start: 2020-01-16 | End: 2021-01-18 | Stop reason: SDUPTHER

## 2020-01-16 RX ORDER — TACROLIMUS 1 MG/1
1 CAPSULE ORAL EVERY 12 HOURS
Qty: 60 CAPSULE | Refills: 11 | Status: SHIPPED | OUTPATIENT
Start: 2020-01-16 | End: 2021-01-18 | Stop reason: SDUPTHER

## 2020-01-16 NOTE — TELEPHONE ENCOUNTER
Annual Transplant follow up assessment    Call placed to annual post transplant patient for health assessment and evaluation of need for annual transplant clinic visit.      -Are you following with a General Nephrologist? Yes Dr MAURIZIO Fuchs If so, who, and when was your last visit? September 2019 and schedule to see him again in March 2020.    -Have you had any hospitalizations since your last visit? No    -What is your current dose of Immunos? Tacrolimus 1mg BID, Myfortic 180mg BID.   Who prescribed your last refill? [facility]     -Do you have any new health problems? No    -Have you been told you have any form of Cancer? No    -Have you had any recurrent infections? No

## 2020-12-09 DIAGNOSIS — Z94.0 KIDNEY REPLACED BY TRANSPLANT: Primary | ICD-10-CM

## 2021-01-08 ENCOUNTER — PATIENT MESSAGE (OUTPATIENT)
Dept: TRANSPLANT | Facility: CLINIC | Age: 66
End: 2021-01-08

## 2021-01-18 DIAGNOSIS — Z94.0 KIDNEY REPLACED BY TRANSPLANT: ICD-10-CM

## 2021-01-19 RX ORDER — MYCOPHENOLIC ACID 180 MG/1
180 TABLET, DELAYED RELEASE ORAL 2 TIMES DAILY
Qty: 60 TABLET | Refills: 11 | Status: SHIPPED | OUTPATIENT
Start: 2021-01-19 | End: 2022-01-14 | Stop reason: SDUPTHER

## 2021-01-19 RX ORDER — TACROLIMUS 1 MG/1
1 CAPSULE ORAL EVERY 12 HOURS
Qty: 60 CAPSULE | Refills: 11 | Status: SHIPPED | OUTPATIENT
Start: 2021-01-19 | End: 2021-01-22 | Stop reason: SDUPTHER

## 2021-01-20 ENCOUNTER — TELEPHONE (OUTPATIENT)
Dept: TRANSPLANT | Facility: CLINIC | Age: 66
End: 2021-01-20

## 2021-01-20 DIAGNOSIS — Z94.0 KIDNEY REPLACED BY TRANSPLANT: Primary | ICD-10-CM

## 2021-01-22 DIAGNOSIS — Z94.0 KIDNEY REPLACED BY TRANSPLANT: ICD-10-CM

## 2021-01-25 ENCOUNTER — OFFICE VISIT (OUTPATIENT)
Dept: TRANSPLANT | Facility: CLINIC | Age: 66
End: 2021-01-25
Payer: MEDICARE

## 2021-01-25 VITALS
HEIGHT: 59 IN | HEART RATE: 68 BPM | SYSTOLIC BLOOD PRESSURE: 144 MMHG | RESPIRATION RATE: 16 BRPM | OXYGEN SATURATION: 98 % | DIASTOLIC BLOOD PRESSURE: 62 MMHG | BODY MASS INDEX: 30.32 KG/M2 | WEIGHT: 150.38 LBS

## 2021-01-25 DIAGNOSIS — I12.9 RENAL HYPERTENSION: Chronic | ICD-10-CM

## 2021-01-25 DIAGNOSIS — Z94.0 STATUS POST DECEASED-DONOR KIDNEY TRANSPLANTATION: Primary | Chronic | ICD-10-CM

## 2021-01-25 DIAGNOSIS — Z29.89 NEED FOR PROPHYLACTIC IMMUNOTHERAPY: Chronic | ICD-10-CM

## 2021-01-25 DIAGNOSIS — E78.2 MIXED HYPERLIPIDEMIA: ICD-10-CM

## 2021-01-25 PROCEDURE — 99999 PR PBB SHADOW E&M-EST. PATIENT-LVL IV: ICD-10-PCS | Mod: PBBFAC,,, | Performed by: NURSE PRACTITIONER

## 2021-01-25 PROCEDURE — 99214 OFFICE O/P EST MOD 30 MIN: CPT | Mod: PBBFAC | Performed by: NURSE PRACTITIONER

## 2021-01-25 PROCEDURE — 99215 OFFICE O/P EST HI 40 MIN: CPT | Mod: S$PBB,,, | Performed by: NURSE PRACTITIONER

## 2021-01-25 PROCEDURE — 99999 PR PBB SHADOW E&M-EST. PATIENT-LVL IV: CPT | Mod: PBBFAC,,, | Performed by: NURSE PRACTITIONER

## 2021-01-25 PROCEDURE — 99215 PR OFFICE/OUTPT VISIT, EST, LEVL V, 40-54 MIN: ICD-10-PCS | Mod: S$PBB,,, | Performed by: NURSE PRACTITIONER

## 2021-01-25 RX ORDER — KETOROLAC TROMETHAMINE 5 MG/ML
1 SOLUTION OPHTHALMIC ONCE
COMMUNITY
Start: 2021-01-15 | End: 2022-08-17 | Stop reason: SDUPTHER

## 2021-01-25 RX ORDER — TACROLIMUS 0.5 MG/1
CAPSULE ORAL
Qty: 150 CAPSULE | Refills: 11 | Status: SHIPPED | OUTPATIENT
Start: 2021-01-25 | End: 2021-02-15 | Stop reason: SDUPTHER

## 2021-01-27 ENCOUNTER — OFFICE VISIT (OUTPATIENT)
Dept: NEPHROLOGY | Facility: CLINIC | Age: 66
End: 2021-01-27
Payer: MEDICARE

## 2021-01-27 ENCOUNTER — LAB VISIT (OUTPATIENT)
Dept: LAB | Facility: HOSPITAL | Age: 66
End: 2021-01-27
Payer: MEDICARE

## 2021-01-27 VITALS
SYSTOLIC BLOOD PRESSURE: 130 MMHG | BODY MASS INDEX: 29.95 KG/M2 | DIASTOLIC BLOOD PRESSURE: 68 MMHG | HEIGHT: 59 IN | WEIGHT: 148.56 LBS | OXYGEN SATURATION: 97 % | HEART RATE: 58 BPM

## 2021-01-27 DIAGNOSIS — E21.3 HYPERPARATHYROIDISM: ICD-10-CM

## 2021-01-27 DIAGNOSIS — N18.2 CHRONIC KIDNEY DISEASE, STAGE II (MILD): Primary | ICD-10-CM

## 2021-01-27 DIAGNOSIS — E83.52 HYPERCALCEMIA: ICD-10-CM

## 2021-01-27 DIAGNOSIS — Z94.0 KIDNEY REPLACED BY TRANSPLANT: ICD-10-CM

## 2021-01-27 LAB
CREAT UR-MCNC: 55 MG/DL (ref 15–325)
PROT UR-MCNC: <7 MG/DL (ref 0–15)
PROT/CREAT UR: NORMAL MG/G{CREAT} (ref 0–0.2)

## 2021-01-27 PROCEDURE — 99214 OFFICE O/P EST MOD 30 MIN: CPT | Mod: S$PBB,,, | Performed by: NURSE PRACTITIONER

## 2021-01-27 PROCEDURE — 99214 PR OFFICE/OUTPT VISIT, EST, LEVL IV, 30-39 MIN: ICD-10-PCS | Mod: S$PBB,,, | Performed by: NURSE PRACTITIONER

## 2021-01-27 PROCEDURE — 99999 PR PBB SHADOW E&M-EST. PATIENT-LVL V: CPT | Mod: PBBFAC,,, | Performed by: NURSE PRACTITIONER

## 2021-01-27 PROCEDURE — 99215 OFFICE O/P EST HI 40 MIN: CPT | Mod: PBBFAC | Performed by: NURSE PRACTITIONER

## 2021-01-27 PROCEDURE — 99999 PR PBB SHADOW E&M-EST. PATIENT-LVL V: ICD-10-PCS | Mod: PBBFAC,,, | Performed by: NURSE PRACTITIONER

## 2021-01-27 PROCEDURE — 84156 ASSAY OF PROTEIN URINE: CPT

## 2021-01-29 ENCOUNTER — PATIENT MESSAGE (OUTPATIENT)
Dept: NEPHROLOGY | Facility: CLINIC | Age: 66
End: 2021-01-29

## 2021-01-29 DIAGNOSIS — E83.52 HYPERCALCEMIA: Primary | ICD-10-CM

## 2021-02-15 DIAGNOSIS — Z94.0 KIDNEY REPLACED BY TRANSPLANT: ICD-10-CM

## 2021-02-15 RX ORDER — TACROLIMUS 0.5 MG/1
CAPSULE ORAL
Qty: 180 CAPSULE | Refills: 11 | Status: SHIPPED | OUTPATIENT
Start: 2021-02-15 | End: 2022-01-31 | Stop reason: SDUPTHER

## 2021-04-14 ENCOUNTER — TELEPHONE (OUTPATIENT)
Dept: NEPHROLOGY | Facility: CLINIC | Age: 66
End: 2021-04-14

## 2021-04-14 DIAGNOSIS — N18.30 STAGE 3 CHRONIC KIDNEY DISEASE, UNSPECIFIED WHETHER STAGE 3A OR 3B CKD: ICD-10-CM

## 2021-04-14 DIAGNOSIS — E83.52 HYPERCALCEMIA: Primary | ICD-10-CM

## 2021-04-20 DIAGNOSIS — N18.30 STAGE 3 CHRONIC KIDNEY DISEASE, UNSPECIFIED WHETHER STAGE 3A OR 3B CKD: Primary | ICD-10-CM

## 2021-04-26 ENCOUNTER — OFFICE VISIT (OUTPATIENT)
Dept: NEPHROLOGY | Facility: CLINIC | Age: 66
End: 2021-04-26
Payer: MEDICARE

## 2021-04-26 VITALS
SYSTOLIC BLOOD PRESSURE: 114 MMHG | WEIGHT: 138.69 LBS | BODY MASS INDEX: 27.96 KG/M2 | OXYGEN SATURATION: 98 % | HEART RATE: 69 BPM | HEIGHT: 59 IN | DIASTOLIC BLOOD PRESSURE: 80 MMHG

## 2021-04-26 DIAGNOSIS — E03.9 HYPOTHYROIDISM, UNSPECIFIED TYPE: ICD-10-CM

## 2021-04-26 DIAGNOSIS — E83.52 HYPERCALCEMIA: Primary | ICD-10-CM

## 2021-04-26 DIAGNOSIS — N18.30 STAGE 3 CHRONIC KIDNEY DISEASE, UNSPECIFIED WHETHER STAGE 3A OR 3B CKD: ICD-10-CM

## 2021-04-26 DIAGNOSIS — E21.3 HYPERPARATHYROIDISM: ICD-10-CM

## 2021-04-26 PROCEDURE — 99999 PR PBB SHADOW E&M-EST. PATIENT-LVL V: ICD-10-PCS | Mod: PBBFAC,,, | Performed by: NURSE PRACTITIONER

## 2021-04-26 PROCEDURE — 99215 OFFICE O/P EST HI 40 MIN: CPT | Mod: PBBFAC | Performed by: NURSE PRACTITIONER

## 2021-04-26 PROCEDURE — 99214 PR OFFICE/OUTPT VISIT, EST, LEVL IV, 30-39 MIN: ICD-10-PCS | Mod: S$PBB,,, | Performed by: NURSE PRACTITIONER

## 2021-04-26 PROCEDURE — 99214 OFFICE O/P EST MOD 30 MIN: CPT | Mod: S$PBB,,, | Performed by: NURSE PRACTITIONER

## 2021-04-26 PROCEDURE — 99999 PR PBB SHADOW E&M-EST. PATIENT-LVL V: CPT | Mod: PBBFAC,,, | Performed by: NURSE PRACTITIONER

## 2021-08-19 ENCOUNTER — OFFICE VISIT (OUTPATIENT)
Dept: ENDOCRINOLOGY | Facility: CLINIC | Age: 66
End: 2021-08-19
Payer: MEDICARE

## 2021-08-19 VITALS
HEIGHT: 59 IN | SYSTOLIC BLOOD PRESSURE: 108 MMHG | HEART RATE: 72 BPM | BODY MASS INDEX: 27.56 KG/M2 | WEIGHT: 136.69 LBS | DIASTOLIC BLOOD PRESSURE: 68 MMHG

## 2021-08-19 DIAGNOSIS — E03.9 HYPOTHYROIDISM, UNSPECIFIED TYPE: ICD-10-CM

## 2021-08-19 DIAGNOSIS — E21.3 HYPERPARATHYROIDISM: ICD-10-CM

## 2021-08-19 DIAGNOSIS — E21.2 TERTIARY HYPERPARATHYROIDISM: ICD-10-CM

## 2021-08-19 DIAGNOSIS — E83.52 HYPERCALCEMIA: ICD-10-CM

## 2021-08-19 DIAGNOSIS — Z29.89 NEED FOR PROPHYLACTIC IMMUNOTHERAPY: Chronic | ICD-10-CM

## 2021-08-19 PROCEDURE — 99215 OFFICE O/P EST HI 40 MIN: CPT | Mod: PBBFAC | Performed by: INTERNAL MEDICINE

## 2021-08-19 PROCEDURE — 99204 PR OFFICE/OUTPT VISIT, NEW, LEVL IV, 45-59 MIN: ICD-10-PCS | Mod: S$PBB,,, | Performed by: INTERNAL MEDICINE

## 2021-08-19 PROCEDURE — 99999 PR PBB SHADOW E&M-EST. PATIENT-LVL V: CPT | Mod: PBBFAC,,, | Performed by: INTERNAL MEDICINE

## 2021-08-19 PROCEDURE — 99999 PR PBB SHADOW E&M-EST. PATIENT-LVL V: ICD-10-PCS | Mod: PBBFAC,,, | Performed by: INTERNAL MEDICINE

## 2021-08-19 PROCEDURE — 99204 OFFICE O/P NEW MOD 45 MIN: CPT | Mod: S$PBB,,, | Performed by: INTERNAL MEDICINE

## 2021-08-25 ENCOUNTER — TELEPHONE (OUTPATIENT)
Dept: NEPHROLOGY | Facility: CLINIC | Age: 66
End: 2021-08-25

## 2021-08-25 ENCOUNTER — OFFICE VISIT (OUTPATIENT)
Dept: NEPHROLOGY | Facility: CLINIC | Age: 66
End: 2021-08-25
Payer: MEDICARE

## 2021-08-25 VITALS
SYSTOLIC BLOOD PRESSURE: 122 MMHG | OXYGEN SATURATION: 97 % | BODY MASS INDEX: 26.27 KG/M2 | WEIGHT: 130.06 LBS | DIASTOLIC BLOOD PRESSURE: 66 MMHG | HEART RATE: 68 BPM

## 2021-08-25 DIAGNOSIS — Z94.0 STATUS POST DECEASED-DONOR KIDNEY TRANSPLANTATION: Primary | ICD-10-CM

## 2021-08-25 DIAGNOSIS — E03.9 HYPOTHYROIDISM, UNSPECIFIED TYPE: ICD-10-CM

## 2021-08-25 PROCEDURE — 99999 PR PBB SHADOW E&M-EST. PATIENT-LVL III: CPT | Mod: PBBFAC,,, | Performed by: INTERNAL MEDICINE

## 2021-08-25 PROCEDURE — 99999 PR PBB SHADOW E&M-EST. PATIENT-LVL III: ICD-10-PCS | Mod: PBBFAC,,, | Performed by: INTERNAL MEDICINE

## 2021-08-25 PROCEDURE — 99214 PR OFFICE/OUTPT VISIT, EST, LEVL IV, 30-39 MIN: ICD-10-PCS | Mod: S$PBB,,, | Performed by: INTERNAL MEDICINE

## 2021-08-25 PROCEDURE — 99214 OFFICE O/P EST MOD 30 MIN: CPT | Mod: S$PBB,,, | Performed by: INTERNAL MEDICINE

## 2021-08-25 PROCEDURE — 99213 OFFICE O/P EST LOW 20 MIN: CPT | Mod: PBBFAC | Performed by: INTERNAL MEDICINE

## 2021-08-25 RX ORDER — ATORVASTATIN CALCIUM 20 MG/1
20 TABLET, FILM COATED ORAL EVERY MORNING
COMMUNITY
Start: 2021-08-23

## 2021-08-25 RX ORDER — CHOLECALCIFEROL (VITAMIN D3) 25 MCG
2000 TABLET ORAL DAILY
COMMUNITY

## 2021-09-09 ENCOUNTER — TELEPHONE (OUTPATIENT)
Dept: TRANSPLANT | Facility: CLINIC | Age: 66
End: 2021-09-09

## 2021-09-24 ENCOUNTER — LAB VISIT (OUTPATIENT)
Dept: LAB | Facility: HOSPITAL | Age: 66
End: 2021-09-24
Attending: INTERNAL MEDICINE
Payer: MEDICARE

## 2021-09-24 DIAGNOSIS — E83.52 HYPERCALCEMIA: ICD-10-CM

## 2021-09-24 DIAGNOSIS — E03.9 HYPOTHYROIDISM, UNSPECIFIED TYPE: ICD-10-CM

## 2021-09-24 DIAGNOSIS — Z94.0 STATUS POST DECEASED-DONOR KIDNEY TRANSPLANTATION: ICD-10-CM

## 2021-09-24 PROCEDURE — 82340 ASSAY OF CALCIUM IN URINE: CPT | Performed by: INTERNAL MEDICINE

## 2021-09-24 PROCEDURE — 82570 ASSAY OF URINE CREATININE: CPT | Performed by: INTERNAL MEDICINE

## 2021-09-25 LAB
CALCIUM 24H UR-MRATE: 14 MG/HR (ref 4–12)
CALCIUM UR-MCNC: 12.6 MG/DL (ref 0–15)
CALCIUM URINE (MG/SPEC): 347 MG/SPEC
CREAT 24H UR-MRATE: 33.8 MG/HR (ref 40–75)
CREAT UR-MCNC: 29.5 MG/DL (ref 15–325)
CREATININE, URINE (MG/SPEC): 811.3 MG/SPEC
URINE COLLECTION DURATION: 24 HR
URINE COLLECTION DURATION: 24 HR
URINE VOLUME: 2750 ML
URINE VOLUME: 2750 ML

## 2021-09-27 ENCOUNTER — LAB VISIT (OUTPATIENT)
Dept: LAB | Facility: HOSPITAL | Age: 66
End: 2021-09-27
Attending: INTERNAL MEDICINE
Payer: MEDICARE

## 2021-09-27 DIAGNOSIS — I12.9 RENAL HYPERTENSION: ICD-10-CM

## 2021-09-27 DIAGNOSIS — Z94.0 STATUS POST DECEASED-DONOR KIDNEY TRANSPLANTATION: ICD-10-CM

## 2021-09-27 DIAGNOSIS — E83.52 HYPERCALCEMIA: ICD-10-CM

## 2021-09-27 DIAGNOSIS — N18.9 CKD (CHRONIC KIDNEY DISEASE): Primary | ICD-10-CM

## 2021-09-27 DIAGNOSIS — E03.9 HYPOTHYROIDISM, UNSPECIFIED TYPE: ICD-10-CM

## 2021-09-27 LAB
ALBUMIN SERPL BCP-MCNC: 3.4 G/DL (ref 3.5–5.2)
ALP SERPL-CCNC: 53 U/L (ref 55–135)
ALP SERPL-CCNC: 53 U/L (ref 55–135)
ALT SERPL W/O P-5'-P-CCNC: 26 U/L (ref 10–44)
ALT SERPL W/O P-5'-P-CCNC: 26 U/L (ref 10–44)
ANION GAP SERPL CALC-SCNC: 6 MMOL/L (ref 8–16)
ANION GAP SERPL CALC-SCNC: 6 MMOL/L (ref 8–16)
AST SERPL-CCNC: 19 U/L (ref 10–40)
AST SERPL-CCNC: 19 U/L (ref 10–40)
BASOPHILS # BLD AUTO: 0.11 K/UL (ref 0–0.2)
BASOPHILS NFR BLD: 1.6 % (ref 0–1.9)
BILIRUB DIRECT SERPL-MCNC: 0.3 MG/DL (ref 0.1–0.3)
BILIRUB SERPL-MCNC: 0.6 MG/DL (ref 0.1–1)
BILIRUB SERPL-MCNC: 0.6 MG/DL (ref 0.1–1)
BILIRUB UR QL STRIP: NEGATIVE
BUN SERPL-MCNC: 36 MG/DL (ref 8–23)
BUN SERPL-MCNC: 36 MG/DL (ref 8–23)
CALCIUM SERPL-MCNC: 10.5 MG/DL (ref 8.7–10.5)
CALCIUM SERPL-MCNC: 10.5 MG/DL (ref 8.7–10.5)
CHLORIDE SERPL-SCNC: 108 MMOL/L (ref 95–110)
CHLORIDE SERPL-SCNC: 108 MMOL/L (ref 95–110)
CLARITY UR: CLEAR
CO2 SERPL-SCNC: 24 MMOL/L (ref 23–29)
CO2 SERPL-SCNC: 24 MMOL/L (ref 23–29)
COLOR UR: YELLOW
CREAT SERPL-MCNC: 0.8 MG/DL (ref 0.5–1.4)
CREAT SERPL-MCNC: 0.8 MG/DL (ref 0.5–1.4)
DIFFERENTIAL METHOD: ABNORMAL
EOSINOPHIL # BLD AUTO: 0.3 K/UL (ref 0–0.5)
EOSINOPHIL NFR BLD: 5 % (ref 0–8)
ERYTHROCYTE [DISTWIDTH] IN BLOOD BY AUTOMATED COUNT: 13.6 % (ref 11.5–14.5)
EST. GFR  (AFRICAN AMERICAN): >60 ML/MIN/1.73 M^2
EST. GFR  (AFRICAN AMERICAN): >60 ML/MIN/1.73 M^2
EST. GFR  (NON AFRICAN AMERICAN): >60 ML/MIN/1.73 M^2
EST. GFR  (NON AFRICAN AMERICAN): >60 ML/MIN/1.73 M^2
GLUCOSE SERPL-MCNC: 107 MG/DL (ref 70–110)
GLUCOSE SERPL-MCNC: 107 MG/DL (ref 70–110)
GLUCOSE UR QL STRIP: NEGATIVE
HCT VFR BLD AUTO: 39 % (ref 37–48.5)
HGB BLD-MCNC: 12.4 G/DL (ref 12–16)
HGB UR QL STRIP: NEGATIVE
IMM GRANULOCYTES # BLD AUTO: 0.01 K/UL (ref 0–0.04)
IMM GRANULOCYTES NFR BLD AUTO: 0.1 % (ref 0–0.5)
KETONES UR QL STRIP: NEGATIVE
LEUKOCYTE ESTERASE UR QL STRIP: NEGATIVE
LYMPHOCYTES # BLD AUTO: 2.1 K/UL (ref 1–4.8)
LYMPHOCYTES NFR BLD: 31.5 % (ref 18–48)
MCH RBC QN AUTO: 30 PG (ref 27–31)
MCHC RBC AUTO-ENTMCNC: 31.8 G/DL (ref 32–36)
MCV RBC AUTO: 94 FL (ref 82–98)
MONOCYTES # BLD AUTO: 1 K/UL (ref 0.3–1)
MONOCYTES NFR BLD: 14.5 % (ref 4–15)
NEUTROPHILS # BLD AUTO: 3.2 K/UL (ref 1.8–7.7)
NEUTROPHILS NFR BLD: 47.3 % (ref 38–73)
NITRITE UR QL STRIP: NEGATIVE
NRBC BLD-RTO: 0 /100 WBC
PH UR STRIP: 6 [PH] (ref 5–8)
PHOSPHATE SERPL-MCNC: 2.3 MG/DL (ref 2.7–4.5)
PLATELET # BLD AUTO: 261 K/UL (ref 150–450)
PMV BLD AUTO: 10.5 FL (ref 9.2–12.9)
POTASSIUM SERPL-SCNC: 4.9 MMOL/L (ref 3.5–5.1)
POTASSIUM SERPL-SCNC: 4.9 MMOL/L (ref 3.5–5.1)
PROT SERPL-MCNC: 6.5 G/DL (ref 6–8.4)
PROT SERPL-MCNC: 6.5 G/DL (ref 6–8.4)
PROT UR QL STRIP: NEGATIVE
PTH-INTACT SERPL-MCNC: 196.4 PG/ML (ref 9–77)
RBC # BLD AUTO: 4.13 M/UL (ref 4–5.4)
SODIUM SERPL-SCNC: 138 MMOL/L (ref 136–145)
SODIUM SERPL-SCNC: 138 MMOL/L (ref 136–145)
SP GR UR STRIP: 1.02 (ref 1–1.03)
TSH SERPL DL<=0.005 MIU/L-ACNC: 3.06 UIU/ML (ref 0.4–4)
TSH SERPL DL<=0.005 MIU/L-ACNC: 3.06 UIU/ML (ref 0.4–4)
URN SPEC COLLECT METH UR: NORMAL
UROBILINOGEN UR STRIP-ACNC: 0.2 EU/DL
WBC # BLD AUTO: 6.74 K/UL (ref 3.9–12.7)

## 2021-09-27 PROCEDURE — 83970 ASSAY OF PARATHORMONE: CPT | Performed by: INTERNAL MEDICINE

## 2021-09-27 PROCEDURE — 85025 COMPLETE CBC W/AUTO DIFF WBC: CPT | Performed by: INTERNAL MEDICINE

## 2021-09-27 PROCEDURE — 81003 URINALYSIS AUTO W/O SCOPE: CPT | Performed by: INTERNAL MEDICINE

## 2021-09-27 PROCEDURE — 80053 COMPREHEN METABOLIC PANEL: CPT | Performed by: INTERNAL MEDICINE

## 2021-09-27 PROCEDURE — 36415 COLL VENOUS BLD VENIPUNCTURE: CPT | Performed by: INTERNAL MEDICINE

## 2021-09-27 PROCEDURE — 84100 ASSAY OF PHOSPHORUS: CPT | Performed by: INTERNAL MEDICINE

## 2021-09-27 PROCEDURE — 84443 ASSAY THYROID STIM HORMONE: CPT | Performed by: INTERNAL MEDICINE

## 2021-09-27 PROCEDURE — 80197 ASSAY OF TACROLIMUS: CPT | Performed by: INTERNAL MEDICINE

## 2021-09-28 LAB
TACROLIMUS BLD-MCNC: 4.4 NG/ML (ref 5–15)
TACROLIMUS BLD-MCNC: 4.4 NG/ML (ref 5–15)
TACROLIMUS, NORMALIZED: 4 NG/ML (ref 5–15)

## 2021-10-14 ENCOUNTER — HOSPITAL ENCOUNTER (OUTPATIENT)
Dept: ENDOCRINOLOGY | Facility: CLINIC | Age: 66
Discharge: HOME | End: 2021-10-14
Attending: INTERNAL MEDICINE
Payer: MEDICARE

## 2021-10-14 DIAGNOSIS — E83.52 HYPERCALCEMIA: ICD-10-CM

## 2021-10-14 PROCEDURE — 76536 US EXAM OF HEAD AND NECK: CPT | Mod: 26,,, | Performed by: INTERNAL MEDICINE

## 2021-10-14 PROCEDURE — 76536 US SOFT TISSUE HEAD NECK THYROID: ICD-10-PCS | Mod: 26,,, | Performed by: INTERNAL MEDICINE

## 2021-10-20 DIAGNOSIS — E21.3 HYPERPARATHYROIDISM: Primary | ICD-10-CM

## 2021-10-20 DIAGNOSIS — E21.2 TERTIARY HYPERPARATHYROIDISM: ICD-10-CM

## 2021-11-04 ENCOUNTER — OFFICE VISIT (OUTPATIENT)
Dept: OTOLARYNGOLOGY | Facility: CLINIC | Age: 66
End: 2021-11-04
Payer: MEDICARE

## 2021-11-04 VITALS
HEART RATE: 60 BPM | BODY MASS INDEX: 28.01 KG/M2 | SYSTOLIC BLOOD PRESSURE: 116 MMHG | WEIGHT: 138.69 LBS | DIASTOLIC BLOOD PRESSURE: 69 MMHG

## 2021-11-04 DIAGNOSIS — Z94.0 STATUS POST DECEASED-DONOR KIDNEY TRANSPLANTATION: Chronic | ICD-10-CM

## 2021-11-04 DIAGNOSIS — E21.2 TERTIARY HYPERPARATHYROIDISM: Primary | ICD-10-CM

## 2021-11-04 DIAGNOSIS — E83.52 HYPERCALCEMIA: ICD-10-CM

## 2021-11-04 PROCEDURE — 99999 PR PBB SHADOW E&M-EST. PATIENT-LVL V: ICD-10-PCS | Mod: PBBFAC,,, | Performed by: OTOLARYNGOLOGY

## 2021-11-04 PROCEDURE — 99204 OFFICE O/P NEW MOD 45 MIN: CPT | Mod: S$PBB,,, | Performed by: OTOLARYNGOLOGY

## 2021-11-04 PROCEDURE — 99204 PR OFFICE/OUTPT VISIT, NEW, LEVL IV, 45-59 MIN: ICD-10-PCS | Mod: S$PBB,,, | Performed by: OTOLARYNGOLOGY

## 2021-11-04 PROCEDURE — 99999 PR PBB SHADOW E&M-EST. PATIENT-LVL V: CPT | Mod: PBBFAC,,, | Performed by: OTOLARYNGOLOGY

## 2021-11-04 PROCEDURE — 99215 OFFICE O/P EST HI 40 MIN: CPT | Mod: PBBFAC | Performed by: OTOLARYNGOLOGY

## 2021-11-04 RX ORDER — LIDOCAINE HYDROCHLORIDE 10 MG/ML
1 INJECTION, SOLUTION EPIDURAL; INFILTRATION; INTRACAUDAL; PERINEURAL ONCE
Status: CANCELLED | OUTPATIENT
Start: 2021-11-04 | End: 2021-11-04

## 2021-11-04 RX ORDER — SODIUM CHLORIDE 0.9 % (FLUSH) 0.9 %
10 SYRINGE (ML) INJECTION
Status: CANCELLED | OUTPATIENT
Start: 2021-11-04

## 2021-12-01 DIAGNOSIS — Z94.0 STATUS POST DECEASED-DONOR KIDNEY TRANSPLANTATION: Primary | ICD-10-CM

## 2021-12-09 ENCOUNTER — OFFICE VISIT (OUTPATIENT)
Dept: ENDOCRINOLOGY | Facility: CLINIC | Age: 66
End: 2021-12-09
Payer: MEDICARE

## 2021-12-09 VITALS
SYSTOLIC BLOOD PRESSURE: 114 MMHG | WEIGHT: 138.25 LBS | HEART RATE: 64 BPM | OXYGEN SATURATION: 97 % | DIASTOLIC BLOOD PRESSURE: 72 MMHG | BODY MASS INDEX: 27.87 KG/M2 | HEIGHT: 59 IN

## 2021-12-09 DIAGNOSIS — E21.3 HYPERPARATHYROIDISM: Primary | ICD-10-CM

## 2021-12-09 DIAGNOSIS — E03.8 HYPOTHYROIDISM DUE TO HASHIMOTO'S THYROIDITIS: ICD-10-CM

## 2021-12-09 DIAGNOSIS — E21.2 TERTIARY HYPERPARATHYROIDISM: ICD-10-CM

## 2021-12-09 DIAGNOSIS — E06.3 HYPOTHYROIDISM DUE TO HASHIMOTO'S THYROIDITIS: ICD-10-CM

## 2021-12-09 PROCEDURE — 99214 OFFICE O/P EST MOD 30 MIN: CPT | Mod: S$PBB,,, | Performed by: INTERNAL MEDICINE

## 2021-12-09 PROCEDURE — 99999 PR PBB SHADOW E&M-EST. PATIENT-LVL IV: ICD-10-PCS | Mod: PBBFAC,,, | Performed by: INTERNAL MEDICINE

## 2021-12-09 PROCEDURE — 99214 PR OFFICE/OUTPT VISIT, EST, LEVL IV, 30-39 MIN: ICD-10-PCS | Mod: S$PBB,,, | Performed by: INTERNAL MEDICINE

## 2021-12-09 PROCEDURE — 99999 PR PBB SHADOW E&M-EST. PATIENT-LVL IV: CPT | Mod: PBBFAC,,, | Performed by: INTERNAL MEDICINE

## 2021-12-09 PROCEDURE — 99214 OFFICE O/P EST MOD 30 MIN: CPT | Mod: PBBFAC | Performed by: INTERNAL MEDICINE

## 2021-12-14 ENCOUNTER — TELEPHONE (OUTPATIENT)
Dept: TRANSPLANT | Facility: CLINIC | Age: 66
End: 2021-12-14
Payer: MEDICARE

## 2021-12-15 DIAGNOSIS — Z94.0 KIDNEY REPLACED BY TRANSPLANT: Primary | ICD-10-CM

## 2021-12-17 ENCOUNTER — LAB VISIT (OUTPATIENT)
Dept: LAB | Facility: HOSPITAL | Age: 66
End: 2021-12-17
Attending: INTERNAL MEDICINE
Payer: MEDICARE

## 2021-12-17 DIAGNOSIS — Z94.0 STATUS POST DECEASED-DONOR KIDNEY TRANSPLANTATION: ICD-10-CM

## 2021-12-17 LAB
ALBUMIN SERPL BCP-MCNC: 3.6 G/DL (ref 3.5–5.2)
ALP SERPL-CCNC: 61 U/L (ref 55–135)
ALT SERPL W/O P-5'-P-CCNC: 23 U/L (ref 10–44)
ANION GAP SERPL CALC-SCNC: 7 MMOL/L (ref 8–16)
AST SERPL-CCNC: 20 U/L (ref 10–40)
BASOPHILS # BLD AUTO: 0.1 K/UL (ref 0–0.2)
BASOPHILS NFR BLD: 1.4 % (ref 0–1.9)
BILIRUB SERPL-MCNC: 0.5 MG/DL (ref 0.1–1)
BILIRUB UR QL STRIP: NEGATIVE
BUN SERPL-MCNC: 45 MG/DL (ref 8–23)
CALCIUM SERPL-MCNC: 10.6 MG/DL (ref 8.7–10.5)
CHLORIDE SERPL-SCNC: 107 MMOL/L (ref 95–110)
CLARITY UR: CLEAR
CO2 SERPL-SCNC: 25 MMOL/L (ref 23–29)
COLOR UR: YELLOW
CREAT SERPL-MCNC: 1 MG/DL (ref 0.5–1.4)
CREAT UR-MCNC: 70.1 MG/DL (ref 15–325)
DIFFERENTIAL METHOD: ABNORMAL
EOSINOPHIL # BLD AUTO: 0.3 K/UL (ref 0–0.5)
EOSINOPHIL NFR BLD: 4.2 % (ref 0–8)
ERYTHROCYTE [DISTWIDTH] IN BLOOD BY AUTOMATED COUNT: 13.7 % (ref 11.5–14.5)
EST. GFR  (AFRICAN AMERICAN): >60 ML/MIN/1.73 M^2
EST. GFR  (NON AFRICAN AMERICAN): 58 ML/MIN/1.73 M^2
GLUCOSE SERPL-MCNC: 108 MG/DL (ref 70–110)
GLUCOSE UR QL STRIP: NEGATIVE
HCT VFR BLD AUTO: 38.6 % (ref 37–48.5)
HGB BLD-MCNC: 13 G/DL (ref 12–16)
HGB UR QL STRIP: NEGATIVE
IMM GRANULOCYTES # BLD AUTO: 0.02 K/UL (ref 0–0.04)
IMM GRANULOCYTES NFR BLD AUTO: 0.3 % (ref 0–0.5)
KETONES UR QL STRIP: NEGATIVE
LEUKOCYTE ESTERASE UR QL STRIP: NEGATIVE
LYMPHOCYTES # BLD AUTO: 2.4 K/UL (ref 1–4.8)
LYMPHOCYTES NFR BLD: 33.4 % (ref 18–48)
MCH RBC QN AUTO: 31.3 PG (ref 27–31)
MCHC RBC AUTO-ENTMCNC: 33.7 G/DL (ref 32–36)
MCV RBC AUTO: 93 FL (ref 82–98)
MONOCYTES # BLD AUTO: 0.9 K/UL (ref 0.3–1)
MONOCYTES NFR BLD: 13.2 % (ref 4–15)
NEUTROPHILS # BLD AUTO: 3.4 K/UL (ref 1.8–7.7)
NEUTROPHILS NFR BLD: 47.5 % (ref 38–73)
NITRITE UR QL STRIP: NEGATIVE
NRBC BLD-RTO: 0 /100 WBC
PH UR STRIP: 7 [PH] (ref 5–8)
PLATELET # BLD AUTO: 238 K/UL (ref 150–450)
PMV BLD AUTO: 10.3 FL (ref 9.2–12.9)
POTASSIUM SERPL-SCNC: 4.4 MMOL/L (ref 3.5–5.1)
PROT SERPL-MCNC: 6.5 G/DL (ref 6–8.4)
PROT UR QL STRIP: NEGATIVE
PROT UR-MCNC: <7 MG/DL (ref 0–15)
PROT/CREAT UR: NORMAL MG/G{CREAT} (ref 0–0.2)
PTH-INTACT SERPL-MCNC: 207.5 PG/ML (ref 9–77)
RBC # BLD AUTO: 4.16 M/UL (ref 4–5.4)
SODIUM SERPL-SCNC: 139 MMOL/L (ref 136–145)
SP GR UR STRIP: 1.02 (ref 1–1.03)
URATE SERPL-MCNC: 7 MG/DL (ref 2.4–5.7)
URN SPEC COLLECT METH UR: NORMAL
UROBILINOGEN UR STRIP-ACNC: NEGATIVE EU/DL
WBC # BLD AUTO: 7.06 K/UL (ref 3.9–12.7)

## 2021-12-17 PROCEDURE — 80053 COMPREHEN METABOLIC PANEL: CPT | Performed by: INTERNAL MEDICINE

## 2021-12-17 PROCEDURE — 84550 ASSAY OF BLOOD/URIC ACID: CPT | Performed by: INTERNAL MEDICINE

## 2021-12-17 PROCEDURE — 83970 ASSAY OF PARATHORMONE: CPT | Performed by: INTERNAL MEDICINE

## 2021-12-17 PROCEDURE — 36415 COLL VENOUS BLD VENIPUNCTURE: CPT | Performed by: INTERNAL MEDICINE

## 2021-12-17 PROCEDURE — 85025 COMPLETE CBC W/AUTO DIFF WBC: CPT | Performed by: INTERNAL MEDICINE

## 2021-12-17 PROCEDURE — 82570 ASSAY OF URINE CREATININE: CPT | Performed by: INTERNAL MEDICINE

## 2021-12-17 PROCEDURE — 81003 URINALYSIS AUTO W/O SCOPE: CPT | Performed by: INTERNAL MEDICINE

## 2021-12-20 ENCOUNTER — TELEPHONE (OUTPATIENT)
Dept: OTOLARYNGOLOGY | Facility: CLINIC | Age: 66
End: 2021-12-20
Payer: MEDICARE

## 2021-12-20 ENCOUNTER — ANESTHESIA EVENT (OUTPATIENT)
Dept: SURGERY | Facility: HOSPITAL | Age: 66
End: 2021-12-20
Payer: MEDICARE

## 2021-12-21 ENCOUNTER — ANESTHESIA (OUTPATIENT)
Dept: SURGERY | Facility: HOSPITAL | Age: 66
End: 2021-12-21
Payer: MEDICARE

## 2021-12-22 ENCOUNTER — TELEPHONE (OUTPATIENT)
Dept: OTOLARYNGOLOGY | Facility: CLINIC | Age: 66
End: 2021-12-22
Payer: MEDICARE

## 2021-12-22 NOTE — ANESTHESIA PREPROCEDURE EVALUATION
[facility]  Anesthesia Pre-Operative Evaluation         Patient Name: Claudette Louise Voss  YOB: 1955  MRN: [id number]    SUBJECTIVE:     Pre-operative evaluation for Procedure(s) (LRB):  PARATHYROIDECTOMY (Bilateral)     2021    Claudette Louise Voss is a 66 y.o. female w/ a significant PMHx of ADPKD s/p kidney transplant  c/b tertiary hyperparathyroidism. Previous difficulty placing central lines and arterial lines due to multiple AV grafts in the past. To OR tomorrow for bilateral parathyroidectomy.    Also with PMHx FELICITA on CPAP, HLD, HTN, hypothyroidism, GERD, breast ca s/p lumpectomy/rad/chemo .    Patient now presents for the above procedure(s).    TTE   CONCLUSIONS     1 - Eccentric hypertrophy.     2 - Low normal to mildly depressed left ventricular function (EF 50%).     3 - Moderate-severe (grade III-IV) diastolic dysfunction.     4 - Mildly depressed right ventricular function .     5 - Moderate left atrial enlargement.     6 - Small pericardial effusion.     7 - Bilateral pleural effusion.     Prev airway: None documented.    Patient Active Problem List   Diagnosis    PKD (polycystic kidney disease) - S/P nephrectomies    Renal hypertension    Hyperlipidemia    Hypothyroidism    Colon polyps    Breast cancer -     GERD (gastroesophageal reflux disease)    Glaucoma    Status post -donor kidney transplant for PKD - 1/16/15    Chronic immunosuppression with Prograf and MMF    CKD (chronic kidney disease) stage 2, GFR 60-89 ml/min    Chronic diarrhea    Tertiary hyperparathyroidism    Hypercalcemia    Anemia of chronic illness    Hypomagnesemia    Malignant neoplasm of upper-outer quadrant of left female breast    Obstructive sleep apnea (adult) (pediatric)     Review of patient's allergies indicates:   Allergen Reactions    Cayenne Other (See Comments)    Acetaminophen Rash    Aspirin Rash    Vancomycin Rash     Yeast, dried Rash     No current facility-administered medications on file prior to encounter.     Current Outpatient Medications on File Prior to Encounter   Medication Sig Dispense Refill    artificial tears,hypromellose, (SYSTANE GEL) 0.3 % Gel Apply 2 drops to eye 3 (three) times daily as needed.  0    atorvastatin (LIPITOR) 20 MG tablet Take 20 mg by mouth once daily.      brimonidine 0.15 % OPTH DROP (ALPHAGAN) 0.15 % ophthalmic solution Place 1 drop into the left eye 2 (two) times daily.      carvedilol (COREG) 6.25 MG tablet Take 1 tablet (6.25 mg total) by mouth 2 (two) times daily. 180 tablet 3    fluoxetine (PROZAC) 20 MG capsule Take 1 capsule (20 mg total) by mouth every evening. 30 capsule 5    ketorolac 0.5% (ACULAR) 0.5 % Drop Place 1 drop into the right eye once daily.      levothyroxine (SYNTHROID) 88 MCG tablet Take 88 mcg by mouth once daily.      magnesium oxide (MAG-OX) 400 mg (241.3 mg magnesium) tablet Take 400 mg by mouth 2 (two) times daily.      mycophenolate (MYFORTIC) 180 MG TbEC Take 1 tablet (180 mg total) by mouth 2 (two) times daily. 60 tablet 11    nifedipine 30 MG ORAL TR24 (PROCARDIA-XL) 30 MG (OSM) 24 hr tablet Take 1 tablet (30 mg total) by mouth once daily. 90 tablet 4    tacrolimus (PROGRAF) 0.5 MG Cap Take 3 capsules (1.5 mg total) by mouth every morning AND 3 capsules (1.5 mg total) every evening. 180 capsule 11    vitamin D (VITAMIN D3) 1000 units Tab Take 1,000 Units by mouth 2 (two) times daily.       Past Surgical History:   Procedure Laterality Date    AV FISTULA PLACEMENT      multiple access     BREAST LUMPECTOMY  2004    CHOLECYSTECTOMY  1987    open    COLONOSCOPY      HERNIA REPAIR  2012    KIDNEY TRANSPLANT      LYMPH NODE DISSECTION  2004    with breast cancer treatment    NEPHRECTOMY  2012    bilateral    SPLENECTOMY, TOTAL  2012     Social History     Socioeconomic History    Marital status:    Tobacco Use    Smoking status:  Never Smoker    Smokeless tobacco: Never Used   Substance and Sexual Activity    Alcohol use: No    Drug use: No   Social History Narrative     3 children.    History of blood transfusions     OBJECTIVE:     Vital Signs Range (Last 24H):         Significant Labs:  Lab Results   Component Value Date    WBC 7.06 12/17/2021    HGB 13.0 12/17/2021    HCT 38.6 12/17/2021     12/17/2021    CHOL 124 01/16/2015    TRIG 123 01/16/2015    HDL 35 (L) 01/16/2015    ALT 23 12/17/2021    AST 20 12/17/2021     12/17/2021    K 4.4 12/17/2021     12/17/2021    CREATININE 1.0 12/17/2021    BUN 45 (H) 12/17/2021    CO2 25 12/17/2021    TSH 3.055 09/27/2021    TSH 3.055 09/27/2021    INR 1.0 01/16/2015    HGBA1C 5.8 10/23/2017       Diagnostic Studies: No relevant studies.    ASSESSMENT/PLAN:       Anesthesia Evaluation    I have reviewed the Patient Summary Reports.    I have reviewed the Nursing Notes. I have reviewed the NPO Status.   I have reviewed the Medications.     Review of Systems  Anesthesia Hx:  Hx of Anesthetic complications Pt reports aspiration event in 2014 that required them to cancel case. No record to review History of prior surgery of interest to airway management or planning: Previous anesthesia: General Airway issues documented on chart review include easy direct laryngoscopy  Denies Family Hx of Anesthesia complications.  Personal Hx of Anesthesia complications   Social:  Non-Smoker, No Alcohol Use    Hematology/Oncology:  Hematology Normal      Current/Recent Cancer.   EENT/Dental:EENT/Dental Normal   Cardiovascular:   Exercise tolerance: good Hypertension Denies CAD.    Denies Dysrhythmias.  hyperlipidemia    Pulmonary:   Denies COPD.  Denies Sleep Apnea.    Renal/:   Chronic Renal Disease S/p transplant and doing very well   Hepatic/GI:   GERD    Neurological:   Denies Neuromuscular Disease.    Endocrine:   Denies Diabetes.    Psych:   Denies Psychiatric History.           Physical Exam  General:  Well nourished    Airway/Jaw/Neck:  Airway Findings: Mouth Opening: Small, but > 3cm Tongue: Normal  General Airway Assessment: Adult  Oropharynx Findings: Normal Mallampati: II  TM Distance: 4 - 6 cm  Jaw/Neck Findings:  Neck ROM: Normal ROM     Eyes/Ears/Nose:  EYES/EARS/NOSE FINDINGS: Normal   Dental:  Dental Findings: In tact   Chest/Lungs:  Chest/Lungs Findings: Clear to auscultation, Normal Respiratory Rate     Heart/Vascular:  Heart Findings: Rate: Normal  Rhythm: Regular Rhythm  Sounds: Normal     Abdomen:  Abdomen Findings:       Mental Status:  Mental Status Findings: Normal        Anesthesia Plan  Type of Anesthesia, risks & benefits discussed:  Anesthesia Type:  general    Patient's Preference:   Plan Factors:          Intra-op Monitoring Plan: standard ASA monitors and arterial line  Intra-op Monitoring Plan Comments:   Post Op Pain Control Plan: multimodal analgesia, IV/PO Opioids PRN and per primary service following discharge from PACU  Post Op Pain Control Plan Comments:     Induction:   IV  Beta Blocker:  Patient is not currently on a Beta-Blocker (No further documentation required).       Informed Consent: Patient understands risks and agrees with Anesthesia plan.  Questions answered. Anesthesia consent signed with patient.  ASA Score: 3     Day of Surgery Review of History & Physical: I have interviewed and examined the patient. I have reviewed the patient's H&P dated:  There are no significant changes.  H&P update referred to the surgeon.         Ready For Surgery From Anesthesia Perspective.

## 2021-12-23 ENCOUNTER — HOSPITAL ENCOUNTER (OUTPATIENT)
Facility: HOSPITAL | Age: 66
Discharge: HOME | End: 2021-12-24
Attending: OTOLARYNGOLOGY | Admitting: OTOLARYNGOLOGY
Payer: MEDICARE

## 2021-12-23 DIAGNOSIS — E83.52 HYPERCALCEMIA: ICD-10-CM

## 2021-12-23 DIAGNOSIS — E21.2 TERTIARY HYPERPARATHYROIDISM: Primary | ICD-10-CM

## 2021-12-23 LAB
CA-I BLDV-SCNC: 1.36 MMOL/L (ref 1.06–1.42)
PTH-INTACT SERPL-MCNC: 249.4 PG/ML (ref 9–77)
PTH-INTACT SERPL-MCNC: 76.5 PG/ML (ref 9–77)
SARS-COV-2 RDRP RESP QL NAA+PROBE: NEGATIVE

## 2021-12-23 PROCEDURE — 71000033 HC RECOVERY, INTIAL HOUR: Performed by: OTOLARYNGOLOGY

## 2021-12-23 PROCEDURE — 88305 TISSUE EXAM BY PATHOLOGIST: CPT | Mod: 59 | Performed by: PATHOLOGY

## 2021-12-23 PROCEDURE — 27201037 HC PRESSURE MONITORING SET UP

## 2021-12-23 PROCEDURE — 83970 ASSAY OF PARATHORMONE: CPT | Mod: 91 | Performed by: OTOLARYNGOLOGY

## 2021-12-23 PROCEDURE — 63600175 PHARM REV CODE 636 W HCPCS: Performed by: STUDENT IN AN ORGANIZED HEALTH CARE EDUCATION/TRAINING PROGRAM

## 2021-12-23 PROCEDURE — 88331 PATH CONSLTJ SURG 1 BLK 1SPC: CPT | Mod: 26,,, | Performed by: PATHOLOGY

## 2021-12-23 PROCEDURE — 36620 INSERTION CATHETER ARTERY: CPT | Mod: 59,,, | Performed by: ANESTHESIOLOGY

## 2021-12-23 PROCEDURE — 25000003 PHARM REV CODE 250: Performed by: STUDENT IN AN ORGANIZED HEALTH CARE EDUCATION/TRAINING PROGRAM

## 2021-12-23 PROCEDURE — 37000009 HC ANESTHESIA EA ADD 15 MINS: Performed by: OTOLARYNGOLOGY

## 2021-12-23 PROCEDURE — 27201423 OPTIME MED/SURG SUP & DEVICES STERILE SUPPLY: Performed by: OTOLARYNGOLOGY

## 2021-12-23 PROCEDURE — 71000016 HC POSTOP RECOV ADDL HR: Performed by: OTOLARYNGOLOGY

## 2021-12-23 PROCEDURE — D9220A PRA ANESTHESIA: ICD-10-PCS | Mod: ANES,,, | Performed by: ANESTHESIOLOGY

## 2021-12-23 PROCEDURE — D9220A PRA ANESTHESIA: Mod: CRNA,,, | Performed by: NURSE ANESTHETIST, CERTIFIED REGISTERED

## 2021-12-23 PROCEDURE — U0002 COVID-19 LAB TEST NON-CDC: HCPCS | Performed by: OTOLARYNGOLOGY

## 2021-12-23 PROCEDURE — 88305 TISSUE EXAM BY PATHOLOGIST: ICD-10-PCS | Mod: 26,,, | Performed by: PATHOLOGY

## 2021-12-23 PROCEDURE — 88331 PR  PATH CONSULT IN SURG,W FRZ SEC: ICD-10-PCS | Mod: 26,,, | Performed by: PATHOLOGY

## 2021-12-23 PROCEDURE — 60500 EXPLORE PARATHYROID GLANDS: CPT | Mod: GC,,, | Performed by: OTOLARYNGOLOGY

## 2021-12-23 PROCEDURE — D9220A PRA ANESTHESIA: Mod: ANES,,, | Performed by: ANESTHESIOLOGY

## 2021-12-23 PROCEDURE — 63600175 PHARM REV CODE 636 W HCPCS: Performed by: NURSE ANESTHETIST, CERTIFIED REGISTERED

## 2021-12-23 PROCEDURE — 60500 PR EXPLORE PARATHYROID GLANDS: ICD-10-PCS | Mod: GC,,, | Performed by: OTOLARYNGOLOGY

## 2021-12-23 PROCEDURE — D9220A PRA ANESTHESIA: ICD-10-PCS | Mod: CRNA,,, | Performed by: NURSE ANESTHETIST, CERTIFIED REGISTERED

## 2021-12-23 PROCEDURE — 88305 TISSUE EXAM BY PATHOLOGIST: CPT | Mod: 26,,, | Performed by: PATHOLOGY

## 2021-12-23 PROCEDURE — 88331 PATH CONSLTJ SURG 1 BLK 1SPC: CPT | Performed by: PATHOLOGY

## 2021-12-23 PROCEDURE — 25000003 PHARM REV CODE 250: Performed by: OTOLARYNGOLOGY

## 2021-12-23 PROCEDURE — 37000008 HC ANESTHESIA 1ST 15 MINUTES: Performed by: OTOLARYNGOLOGY

## 2021-12-23 PROCEDURE — 36000706: Performed by: OTOLARYNGOLOGY

## 2021-12-23 PROCEDURE — 71000039 HC RECOVERY, EACH ADD'L HOUR: Performed by: OTOLARYNGOLOGY

## 2021-12-23 PROCEDURE — 36000707: Performed by: OTOLARYNGOLOGY

## 2021-12-23 PROCEDURE — 36500 PR VENOUS SAMPLING BY CATHETER, W/ORGAN BLOOD SAMPLE: ICD-10-PCS | Mod: 51,GC,, | Performed by: OTOLARYNGOLOGY

## 2021-12-23 PROCEDURE — 36620 PR INSERT CATH,ART,PERCUT,SHORTTERM: ICD-10-PCS | Mod: 59,,, | Performed by: ANESTHESIOLOGY

## 2021-12-23 PROCEDURE — 71000015 HC POSTOP RECOV 1ST HR: Performed by: OTOLARYNGOLOGY

## 2021-12-23 PROCEDURE — 36500 INSERTION OF CATHETER VEIN: CPT | Mod: 51,GC,, | Performed by: OTOLARYNGOLOGY

## 2021-12-23 PROCEDURE — 82330 ASSAY OF CALCIUM: CPT | Performed by: STUDENT IN AN ORGANIZED HEALTH CARE EDUCATION/TRAINING PROGRAM

## 2021-12-23 PROCEDURE — C1729 CATH, DRAINAGE: HCPCS | Performed by: OTOLARYNGOLOGY

## 2021-12-23 RX ORDER — LEVOTHYROXINE SODIUM 88 UG/1
88 TABLET ORAL DAILY
Status: DISCONTINUED | OUTPATIENT
Start: 2021-12-24 | End: 2021-12-24 | Stop reason: HOSPADM

## 2021-12-23 RX ORDER — LIDOCAINE HYDROCHLORIDE AND EPINEPHRINE 10; 10 MG/ML; UG/ML
INJECTION, SOLUTION INFILTRATION; PERINEURAL
Status: DISCONTINUED | OUTPATIENT
Start: 2021-12-23 | End: 2021-12-23 | Stop reason: HOSPADM

## 2021-12-23 RX ORDER — ROCURONIUM BROMIDE 10 MG/ML
INJECTION, SOLUTION INTRAVENOUS
Status: DISCONTINUED | OUTPATIENT
Start: 2021-12-23 | End: 2021-12-23

## 2021-12-23 RX ORDER — BRIMONIDINE TARTRATE 1.5 MG/ML
1 SOLUTION/ DROPS OPHTHALMIC 2 TIMES DAILY
Status: DISCONTINUED | OUTPATIENT
Start: 2021-12-23 | End: 2021-12-24 | Stop reason: HOSPADM

## 2021-12-23 RX ORDER — HALOPERIDOL 5 MG/ML
0.5 INJECTION INTRAMUSCULAR EVERY 10 MIN PRN
Status: DISCONTINUED | OUTPATIENT
Start: 2021-12-23 | End: 2021-12-23 | Stop reason: HOSPADM

## 2021-12-23 RX ORDER — LIDOCAINE HYDROCHLORIDE 20 MG/ML
INJECTION, SOLUTION EPIDURAL; INFILTRATION; INTRACAUDAL; PERINEURAL
Status: DISCONTINUED | OUTPATIENT
Start: 2021-12-23 | End: 2021-12-23

## 2021-12-23 RX ORDER — OXYCODONE HYDROCHLORIDE 5 MG/1
5 TABLET ORAL EVERY 4 HOURS PRN
Status: DISCONTINUED | OUTPATIENT
Start: 2021-12-23 | End: 2021-12-24 | Stop reason: HOSPADM

## 2021-12-23 RX ORDER — TALC
6 POWDER (GRAM) TOPICAL NIGHTLY PRN
Status: DISCONTINUED | OUTPATIENT
Start: 2021-12-23 | End: 2021-12-24 | Stop reason: HOSPADM

## 2021-12-23 RX ORDER — NIFEDIPINE 30 MG/1
30 TABLET, EXTENDED RELEASE ORAL DAILY
Status: DISCONTINUED | OUTPATIENT
Start: 2021-12-24 | End: 2021-12-24 | Stop reason: HOSPADM

## 2021-12-23 RX ORDER — ACETAMINOPHEN 325 MG/1
650 TABLET ORAL EVERY 8 HOURS PRN
Status: CANCELLED | OUTPATIENT
Start: 2021-12-23

## 2021-12-23 RX ORDER — ACETAMINOPHEN 325 MG/1
650 TABLET ORAL EVERY 4 HOURS PRN
Status: CANCELLED | OUTPATIENT
Start: 2021-12-23

## 2021-12-23 RX ORDER — SUCCINYLCHOLINE CHLORIDE 20 MG/ML
INJECTION INTRAMUSCULAR; INTRAVENOUS
Status: DISCONTINUED | OUTPATIENT
Start: 2021-12-23 | End: 2021-12-23

## 2021-12-23 RX ORDER — LANOLIN ALCOHOL/MO/W.PET/CERES
400 CREAM (GRAM) TOPICAL 2 TIMES DAILY
Status: DISCONTINUED | OUTPATIENT
Start: 2021-12-23 | End: 2021-12-24 | Stop reason: HOSPADM

## 2021-12-23 RX ORDER — ONDANSETRON 2 MG/ML
INJECTION INTRAMUSCULAR; INTRAVENOUS
Status: DISCONTINUED | OUTPATIENT
Start: 2021-12-23 | End: 2021-12-23

## 2021-12-23 RX ORDER — CEPHALEXIN 500 MG/1
500 CAPSULE ORAL EVERY 12 HOURS
Status: DISCONTINUED | OUTPATIENT
Start: 2021-12-23 | End: 2021-12-24 | Stop reason: HOSPADM

## 2021-12-23 RX ORDER — PROPOFOL 10 MG/ML
VIAL (ML) INTRAVENOUS
Status: DISCONTINUED | OUTPATIENT
Start: 2021-12-23 | End: 2021-12-23

## 2021-12-23 RX ORDER — CARVEDILOL 6.25 MG/1
6.25 TABLET ORAL 2 TIMES DAILY
Status: DISCONTINUED | OUTPATIENT
Start: 2021-12-23 | End: 2021-12-24 | Stop reason: HOSPADM

## 2021-12-23 RX ORDER — MYCOPHENOLIC ACID 180 MG/1
180 TABLET, DELAYED RELEASE ORAL 2 TIMES DAILY
Status: DISCONTINUED | OUTPATIENT
Start: 2021-12-23 | End: 2021-12-24 | Stop reason: HOSPADM

## 2021-12-23 RX ORDER — PHENYLEPHRINE HCL IN 0.9% NACL 1 MG/10 ML
SYRINGE (ML) INTRAVENOUS
Status: DISCONTINUED | OUTPATIENT
Start: 2021-12-23 | End: 2021-12-23

## 2021-12-23 RX ORDER — OXYCODONE HYDROCHLORIDE 10 MG/1
10 TABLET ORAL EVERY 4 HOURS PRN
Status: DISCONTINUED | OUTPATIENT
Start: 2021-12-23 | End: 2021-12-24 | Stop reason: HOSPADM

## 2021-12-23 RX ORDER — FLUOXETINE HYDROCHLORIDE 20 MG/1
20 CAPSULE ORAL NIGHTLY
Status: DISCONTINUED | OUTPATIENT
Start: 2021-12-23 | End: 2021-12-24 | Stop reason: HOSPADM

## 2021-12-23 RX ORDER — SODIUM CHLORIDE 0.9 % (FLUSH) 0.9 %
10 SYRINGE (ML) INJECTION
Status: DISCONTINUED | OUTPATIENT
Start: 2021-12-23 | End: 2021-12-23 | Stop reason: HOSPADM

## 2021-12-23 RX ORDER — CEFAZOLIN SODIUM 2 G/50ML
2 SOLUTION INTRAVENOUS
Status: DISCONTINUED | OUTPATIENT
Start: 2021-12-23 | End: 2021-12-23 | Stop reason: HOSPADM

## 2021-12-23 RX ORDER — CHOLECALCIFEROL (VITAMIN D3) 25 MCG
1000 TABLET ORAL 2 TIMES DAILY
Status: DISCONTINUED | OUTPATIENT
Start: 2021-12-23 | End: 2021-12-24 | Stop reason: HOSPADM

## 2021-12-23 RX ORDER — ONDANSETRON 2 MG/ML
8 INJECTION INTRAMUSCULAR; INTRAVENOUS EVERY 8 HOURS PRN
Status: DISCONTINUED | OUTPATIENT
Start: 2021-12-23 | End: 2021-12-24 | Stop reason: HOSPADM

## 2021-12-23 RX ORDER — ATORVASTATIN CALCIUM 20 MG/1
20 TABLET, FILM COATED ORAL DAILY
Status: DISCONTINUED | OUTPATIENT
Start: 2021-12-24 | End: 2021-12-24 | Stop reason: HOSPADM

## 2021-12-23 RX ORDER — KETOROLAC TROMETHAMINE 5 MG/ML
1 SOLUTION OPHTHALMIC DAILY
Status: DISCONTINUED | OUTPATIENT
Start: 2021-12-24 | End: 2021-12-24 | Stop reason: HOSPADM

## 2021-12-23 RX ORDER — LIDOCAINE HYDROCHLORIDE 10 MG/ML
1 INJECTION, SOLUTION EPIDURAL; INFILTRATION; INTRACAUDAL; PERINEURAL ONCE
Status: DISCONTINUED | OUTPATIENT
Start: 2021-12-23 | End: 2021-12-23 | Stop reason: HOSPADM

## 2021-12-23 RX ORDER — MIDAZOLAM HYDROCHLORIDE 5 MG/ML
INJECTION INTRAMUSCULAR; INTRAVENOUS
Status: DISCONTINUED | OUTPATIENT
Start: 2021-12-23 | End: 2021-12-23

## 2021-12-23 RX ORDER — SODIUM CHLORIDE 0.9 % (FLUSH) 0.9 %
3 SYRINGE (ML) INJECTION EVERY 4 HOURS PRN
Status: DISCONTINUED | OUTPATIENT
Start: 2021-12-23 | End: 2021-12-23 | Stop reason: HOSPADM

## 2021-12-23 RX ORDER — FENTANYL CITRATE 50 UG/ML
INJECTION, SOLUTION INTRAMUSCULAR; INTRAVENOUS
Status: DISCONTINUED | OUTPATIENT
Start: 2021-12-23 | End: 2021-12-23

## 2021-12-23 RX ORDER — CEFAZOLIN SODIUM 1 G/3ML
INJECTION, POWDER, FOR SOLUTION INTRAMUSCULAR; INTRAVENOUS
Status: DISCONTINUED | OUTPATIENT
Start: 2021-12-23 | End: 2021-12-23

## 2021-12-23 RX ORDER — HYDROMORPHONE HYDROCHLORIDE 1 MG/ML
0.2 INJECTION, SOLUTION INTRAMUSCULAR; INTRAVENOUS; SUBCUTANEOUS EVERY 5 MIN PRN
Status: DISCONTINUED | OUTPATIENT
Start: 2021-12-23 | End: 2021-12-23 | Stop reason: HOSPADM

## 2021-12-23 RX ORDER — DEXAMETHASONE SODIUM PHOSPHATE 4 MG/ML
INJECTION, SOLUTION INTRA-ARTICULAR; INTRALESIONAL; INTRAMUSCULAR; INTRAVENOUS; SOFT TISSUE
Status: DISCONTINUED | OUTPATIENT
Start: 2021-12-23 | End: 2021-12-23

## 2021-12-23 RX ADMIN — FENTANYL CITRATE 25 MCG: 50 INJECTION, SOLUTION INTRAMUSCULAR; INTRAVENOUS at 02:12

## 2021-12-23 RX ADMIN — HYDROMORPHONE HYDROCHLORIDE 0.2 MG: 1 INJECTION, SOLUTION INTRAMUSCULAR; INTRAVENOUS; SUBCUTANEOUS at 05:12

## 2021-12-23 RX ADMIN — TACROLIMUS 1.5 MG: 1 CAPSULE ORAL at 10:12

## 2021-12-23 RX ADMIN — Medication 100 MCG: at 02:12

## 2021-12-23 RX ADMIN — REMIFENTANIL HYDROCHLORIDE 0.1 MCG/KG/MIN: 1 INJECTION, POWDER, LYOPHILIZED, FOR SOLUTION INTRAVENOUS at 02:12

## 2021-12-23 RX ADMIN — Medication 6 MG: at 11:12

## 2021-12-23 RX ADMIN — CHOLECALCIFEROL TAB 25 MCG (1000 UNIT) 1000 UNITS: 25 TAB at 10:12

## 2021-12-23 RX ADMIN — MIDAZOLAM 1 MG: 5 INJECTION INTRAMUSCULAR; INTRAVENOUS at 01:12

## 2021-12-23 RX ADMIN — ONDANSETRON 4 MG: 2 INJECTION INTRAMUSCULAR; INTRAVENOUS at 03:12

## 2021-12-23 RX ADMIN — PROPOFOL 20 MG: 10 INJECTION, EMULSION INTRAVENOUS at 02:12

## 2021-12-23 RX ADMIN — OXYCODONE 5 MG: 5 TABLET ORAL at 11:12

## 2021-12-23 RX ADMIN — SODIUM CHLORIDE: 0.9 INJECTION, SOLUTION INTRAVENOUS at 12:12

## 2021-12-23 RX ADMIN — MYCOPHENILIC ACID 180 MG: 180 TABLET, DELAYED RELEASE ORAL at 10:12

## 2021-12-23 RX ADMIN — PROPOFOL 40 MG: 10 INJECTION, EMULSION INTRAVENOUS at 01:12

## 2021-12-23 RX ADMIN — SUCCINYLCHOLINE CHLORIDE 120 MG: 20 INJECTION, SOLUTION INTRAMUSCULAR; INTRAVENOUS; PARENTERAL at 01:12

## 2021-12-23 RX ADMIN — SODIUM CHLORIDE, SODIUM GLUCONATE, SODIUM ACETATE, POTASSIUM CHLORIDE, MAGNESIUM CHLORIDE, SODIUM PHOSPHATE, DIBASIC, AND POTASSIUM PHOSPHATE: .53; .5; .37; .037; .03; .012; .00082 INJECTION, SOLUTION INTRAVENOUS at 04:12

## 2021-12-23 RX ADMIN — FENTANYL CITRATE 75 MCG: 50 INJECTION, SOLUTION INTRAMUSCULAR; INTRAVENOUS at 01:12

## 2021-12-23 RX ADMIN — CEPHALEXIN 500 MG: 500 CAPSULE ORAL at 10:12

## 2021-12-23 RX ADMIN — Medication 400 MG: at 10:12

## 2021-12-23 RX ADMIN — CEFAZOLIN 2 G: 330 INJECTION, POWDER, FOR SOLUTION INTRAMUSCULAR; INTRAVENOUS at 01:12

## 2021-12-23 RX ADMIN — FLUOXETINE 20 MG: 20 CAPSULE ORAL at 10:12

## 2021-12-23 RX ADMIN — SODIUM CHLORIDE, SODIUM GLUCONATE, SODIUM ACETATE, POTASSIUM CHLORIDE, MAGNESIUM CHLORIDE, SODIUM PHOSPHATE, DIBASIC, AND POTASSIUM PHOSPHATE: .53; .5; .37; .037; .03; .012; .00082 INJECTION, SOLUTION INTRAVENOUS at 02:12

## 2021-12-23 RX ADMIN — CARVEDILOL 6.25 MG: 6.25 TABLET, FILM COATED ORAL at 10:12

## 2021-12-23 RX ADMIN — Medication 50 MCG: at 02:12

## 2021-12-23 RX ADMIN — LIDOCAINE HYDROCHLORIDE 40 MG: 20 INJECTION, SOLUTION EPIDURAL; INFILTRATION; INTRACAUDAL at 01:12

## 2021-12-23 RX ADMIN — Medication 100 MCG: at 03:12

## 2021-12-23 RX ADMIN — ROCURONIUM BROMIDE 5 MG: 10 INJECTION, SOLUTION INTRAVENOUS at 01:12

## 2021-12-23 RX ADMIN — DEXAMETHASONE SODIUM PHOSPHATE 4 MG: 4 INJECTION INTRA-ARTICULAR; INTRALESIONAL; INTRAMUSCULAR; INTRAVENOUS; SOFT TISSUE at 01:12

## 2021-12-23 NOTE — BRIEF OP NOTE
[facility] - [facility] ([facility])  Brief Operative Note    SUMMARY     Surgery Date: 12/23/2021     Surgeon(s) and Role:     * Gera Frank MD - Primary     * Fam Medina MD - Resident - Assisting     * Amadeo Rodas MD - Resident - Assisting        Pre-op Diagnosis:  Tertiary hyperparathyroidism [E21.2]    Post-op Diagnosis:  Post-Op Diagnosis Codes:     * Tertiary hyperparathyroidism [E21.2]    Procedure(s) (LRB):  PARATHYROIDECTOMY (Bilateral)    Anesthesia: General    Operative Findings: s/p excision of right superior, right inferior, and left inferior parathyroid glands; left superior parathyroid gland identified and left intact    Estimated Blood Loss:     Estimated Blood Loss has not been documented. EBL = 40 cc.         Specimens:   Specimen (24h ago, onward)                 Start     Ordered    12/23/21 1601  Specimen to Pathology, Surgery ENT  Once        Comments: Pre-op Diagnosis: Tertiary hyperparathyroidism [E21.2]    Procedure(s):  PARATHYROIDECTOMY     Number of specimens: 3  Name of specimens: 1. probable right superior parathyroid- frozen   2. right inferior parathyroid- frozen   3. left inferior parathyroid - frozen   References:    Click here for ordering Quick Tip   Question Answer Comment   Procedure Type: ENT    Specimen Class: Routine/Screening    Release to patient Immediate        12/23/21 1601                  As above  I was present for and participated in all aspects of this operation.     [id number]

## 2021-12-23 NOTE — PROGRESS NOTES
Spoke to Dr. Garrett with anesthesia and informed her of patient's c/o sore throat, new order for rapid covid swab noted.

## 2021-12-23 NOTE — ANESTHESIA PROCEDURE NOTES
Intubation    Date/Time: 12/23/2021 1:33 PM  Performed by: Felipe Choi MD  Authorized by: Nancy Garrett MD     Intubation:     Induction:  Rapid sequence induction    Intubated:  Postinduction    Mask Ventilation:  N/a    Attempts:  1    Attempted By:  Resident anesthesiologist    Method of Intubation:  Video laryngoscopy    Blade:  Snyder 3    Laryngeal View Grade: Grade I - full view of cords      Difficult Airway Encountered?: No      Complications:  None    Airway Device:  EMG ETT (NIMS)    Airway Device Size:  7.0    Style/Cuff Inflation:  Cuffed    Tube secured:  21    Secured at:  The lips    Placement Verified By:  Capnometry and Revisualization with laryngoscopy    Complicating Factors:  None    Findings Post-Intubation:  BS equal bilateral and atraumatic/condition of teeth unchanged

## 2021-12-23 NOTE — H&P
[facility]     Subjective:       Patient ID: Claudette Louise Voss is a 66 y.o. female.     Chief Complaint: No chief complaint on file.     HPI      Claudette Louise Voss is a 66 y.o. female with a history of kidney transplant in 2015 due to polycystic kidney disease after 9 years on dialysis. She has developed tertiary hyperparathyroidism likely as a result. She has no history of fractures, but her vision is failing as a result of glaucoma, so she is worried about falls and potential fractures. She is not on sensipar. She has no history of kidney stones, even prior to transplant, and there is no abdominal pain.      She has a history of breast cancer dating to 2004 that was managed with surgery then chemotherapy and radiation. She has had a 24 hour urine calcium, recent PTH, and a neck US.           Past Medical History:   Diagnosis Date    Breast cancer - 2004 11/29/2013    Chronic immunosuppression with Prograf and MMF 1/16/2015    CKD (chronic kidney disease) stage 3, GFR 30-59 ml/min      Colon polyps 11/29/2013    ESRD (end stage renal disease) 11/29/2013    GERD (gastroesophageal reflux disease) 11/29/2013    Glaucoma 11/29/2013    Hyperlipidemia 11/29/2013    Hypertension 11/29/2013    Hypothyroidism 11/29/2013    Malignant neoplasm of upper-outer quadrant of left female breast 8/25/2004    PKD (polycystic kidney disease) - S/P nephrectomies 11/29/2013    Renal hypertension                 Past Surgical History:   Procedure Laterality Date    AV FISTULA PLACEMENT         multiple access     BREAST LUMPECTOMY   2004    CHOLECYSTECTOMY   1987     open    COLONOSCOPY        HERNIA REPAIR   2012    KIDNEY TRANSPLANT        LYMPH NODE DISSECTION   2004     with breast cancer treatment    NEPHRECTOMY   2012     bilateral    SPLENECTOMY, TOTAL   2012            Current Outpatient Medications:     artificial tears,hypromellose, (SYSTANE GEL) 0.3 % Gel, Apply 2  drops to eye 3 (three) times daily as needed., Disp: , Rfl: 0    atorvastatin (LIPITOR) 20 MG tablet, Take 20 mg by mouth once daily., Disp: , Rfl:     brimonidine 0.15 % OPTH DROP (ALPHAGAN) 0.15 % ophthalmic solution, Place 1 drop into the left eye 2 (two) times daily., Disp: , Rfl:     fluoxetine (PROZAC) 20 MG capsule, Take 1 capsule (20 mg total) by mouth every evening., Disp: 30 capsule, Rfl: 5    ketorolac 0.5% (ACULAR) 0.5 % Drop, Place 1 drop into the right eye once daily., Disp: , Rfl:     levothyroxine (SYNTHROID) 88 MCG tablet, Take 88 mcg by mouth once daily., Disp: , Rfl:     magnesium oxide (MAG-OX) 400 mg (241.3 mg magnesium) tablet, Take 400 mg by mouth 2 (two) times daily., Disp: , Rfl:     mycophenolate (MYFORTIC) 180 MG TbEC, Take 1 tablet (180 mg total) by mouth 2 (two) times daily., Disp: 60 tablet, Rfl: 11    nifedipine 30 MG ORAL TR24 (PROCARDIA-XL) 30 MG (OSM) 24 hr tablet, Take 1 tablet (30 mg total) by mouth once daily., Disp: 90 tablet, Rfl: 4    tacrolimus (PROGRAF) 0.5 MG Cap, Take 3 capsules (1.5 mg total) by mouth every morning AND 3 capsules (1.5 mg total) every evening., Disp: 180 capsule, Rfl: 11    vitamin D (VITAMIN D3) 1000 units Tab, Take 1,000 Units by mouth 2 (two) times daily., Disp: , Rfl:     atorvastatin (LIPITOR) 10 MG tablet, Take 1 tablet (10 mg total) by mouth once daily. (Patient taking differently: Take 20 mg by mouth once daily. ), Disp: 30 tablet, Rfl: 5    carvedilol (COREG) 6.25 MG tablet, Take 1 tablet (6.25 mg total) by mouth 2 (two) times daily., Disp: 180 tablet, Rfl: 3          Review of patient's allergies indicates:   Allergen Reactions    Cayenne Other (See Comments)    Acetaminophen Rash    Aspirin Rash    Vancomycin Rash    Yeast, dried Rash         Social History              Socioeconomic History    Marital status:    Tobacco Use    Smoking status: Never Smoker    Smokeless tobacco: Never Used   Substance and Sexual  Activity    Alcohol use: No    Drug use: No   Social History Narrative      3 children.     History of blood transfusions                  Family History   Problem Relation Age of Onset    Stroke Mother      Diabetes Mother      Kidney disease Father           PKD    Kidney disease Sister           PKD    Cancer Neg Hx           Review of Systems   Constitutional: Negative.  Negative for activity change, appetite change, chills, fatigue, fever and unexpected weight change.   HENT: Negative.  Negative for congestion, dental problem, drooling, ear discharge, ear pain, facial swelling, hearing loss, mouth sores, nosebleeds, postnasal drip, rhinorrhea, sinus pressure, sneezing, sore throat, tinnitus, trouble swallowing and voice change.    Eyes: Negative.  Negative for pain and visual disturbance.   Respiratory: Negative for cough, choking and shortness of breath.    Cardiovascular: Negative.  Negative for chest pain, palpitations and leg swelling.   Gastrointestinal: Negative.  Negative for abdominal pain, constipation, diarrhea, nausea and vomiting.   Endocrine: Negative.  Negative for cold intolerance and heat intolerance.   Genitourinary: Negative.  Negative for difficulty urinating, dysuria and hematuria.   Musculoskeletal: Negative.  Negative for arthralgias, back pain, gait problem, myalgias, neck pain and neck stiffness.   Skin: Negative.  Negative for rash and wound.   Allergic/Immunologic: Positive for food allergies. Negative for environmental allergies and immunocompromised state.   Neurological: Negative.  Negative for dizziness, facial asymmetry, speech difficulty, weakness, light-headedness, numbness and headaches.   Hematological: Negative.  Negative for adenopathy. Does not bruise/bleed easily.   Psychiatric/Behavioral: Negative.  Negative for dysphoric mood. The patient is not nervous/anxious.        Objective:     Physical Exam  Vitals reviewed.   Constitutional:       General: She is  not in acute distress.     Appearance: She is well-developed.   HENT:      Head: Normocephalic and atraumatic.      Jaw: No trismus.      Salivary Glands: Right salivary gland is not diffusely enlarged or tender. Left salivary gland is not diffusely enlarged or tender.      Comments: Salivary glands - there are no lesions or asymmetric findings in the submandibular or parotid glands     Right Ear: Hearing, tympanic membrane, ear canal and external ear normal.      Left Ear: Hearing, tympanic membrane, ear canal and external ear normal.      Nose: No nasal deformity or rhinorrhea.      Mouth/Throat:      Mouth: No oral lesions.      Tongue: No lesions.      Palate: No mass and lesions.      Pharynx: Uvula midline.      Comments: NP: No lesions of posterior wall  OP: No lesions of posterior wall or BOT. BOT is soft to palpation  Larynx: No lesions of glottic or supraglottic larynx. Normal vocal fold mobility    HP: No lesions of pyriform sinuses or postcricoid region  Mirror examination was performed.    Eyes:      General: Lids are normal.      Extraocular Movements: Extraocular movements intact.      Conjunctiva/sclera:      Right eye: Right conjunctiva is not injected. No chemosis.     Left eye: Left conjunctiva is not injected. No chemosis.  Neck:      Thyroid: No thyroid mass or thyromegaly.      Vascular: No JVD.      Trachea: Phonation normal. No tracheal deviation.      Comments: No incisions  Pulmonary:      Effort: Pulmonary effort is normal. No accessory muscle usage or respiratory distress.      Breath sounds: No stridor.   Chest:   Breasts:      Right: No supraclavicular adenopathy.      Left: No supraclavicular adenopathy.         Musculoskeletal:         General: No tenderness.      Cervical back: Full passive range of motion without pain.   Lymphadenopathy:      Cervical: No cervical adenopathy.      Right cervical: No deep or posterior cervical adenopathy.     Left cervical: No deep or posterior  cervical adenopathy.      Upper Body:      Right upper body: No supraclavicular adenopathy.      Left upper body: No supraclavicular adenopathy.   Skin:     Findings: No erythema, lesion or rash.      Nails: There is no clubbing.   Neurological:      Mental Status: She is alert and oriented to person, place, and time.      Cranial Nerves: No cranial nerve deficit or facial asymmetry.      Motor: No weakness.      Gait: Gait normal.   Psychiatric:         Speech: Speech normal.         Behavior: Behavior is cooperative.           Calcium 10.5 on 9/27/21     Urine calcium 14     Component      Latest Ref Rng & Units 9/27/2021 4/19/2021 1/27/2021 9/29/2020   PTH      9.0 - 77.0 pg/mL 196.4 (H) 163.8 (H) 164.0 (H) 201.5 (H)      Component      Latest Ref Rng & Units 7/13/2020 9/19/2019 3/14/2019 9/18/2018   PTH      9.0 - 77.0 pg/mL 244.9 (H) 236.9 (H) 220.4 (H) 191.1 (H)      Neck US:  PARATHYROIDS:     1. Posterior to the right inferior lobe of the thyroid there is a hypoechoic lesion measuring 0.56 x 0.41 x 0.47 cm.  The lesion appears to be extrathyroidal and abuts the capsule of the thyroid. There is polar vascular flow on Doppler.  The lesion likely represents a parathyroid adenoma.  2.  Posterior to the left inferior lobe of the thyroid there is a hypoechoic lesion measuring 0.67 x 0.58 x 0.60 cm.  The lesion appears to be extrathyroidal and abuts the capsule of the thyroid.  There is polar vascular flow on Doppler. The lesion likely represents a parathyroid adenoma.     Impression:     1.  Thyroid gland is normal in size with diffuse pseudonodular pattern consistent with Hashimoto's disease.     2.  No distinct nodules are identified within the thyroid.     3.  Probable right inferior and left inferior parathyroid adenomas.     Dexa scan 5/5/2017:  Findings: Densitometry of the lumbar spine measures 1.184 with a T score of -0.1, within normal limits.  The density is increased from 1/14/2009 when it measured  0.992.     Densitometry of the left femoral neck measures 0.831 with T score of -1.5, consistent with osteopenia.  The density is increased from 2009 when it measured 0.806.  IMPRESSION:    Osteopenia of the left femoral neck.     Assessment & Plan:            Problem List Items Addressed This Visit      Status post -donor kidney transplant for PKD - 1/16/15 (Chronic)      Tertiary hyperparathyroidism - Primary        Claudette Louise Voss is a 66 y.o. female s/p kidney transplant for PKD who has tertiary hyperparathyroidism. At two of her glands appear enlarged on the US. Because of the diagnosis, I have offered a 4 gland exploration and subtotal parathyroidectomy, using the intraoperative PTH assay. Her other options include observation and medical management. She has considered them and expresses a desire for surgery.     I have offered minimally invasive parathyroidectomy, employing the rapid parathyroid hormone assay.  If the rapid assay is used, then serial blood draws will be accomplished until the PTH level has dropped at least 50% AND into the normal range. In her case, a four gland exploration is likely required.       I explained the procedure and noted that the patient may spend one night in the hospital so that we can monitor the calcium levels and determine if the patient needs supplementation while the remaining glands recover their function, as they have likely suppressed by the hyperfunctioning gland or glands.  I then discussed the risks, benefits, indications, and alternatives of minimally invasive parathyroidectomy.  The risks were noted to include, but not be limited to, infection, bleeding, scarring, hoarseness from injury to the recurrent laryngeal nerve or superior laryngeal nerve, persistent hyperparathyroidism, hypoparathyroidism and hypocalcemia, and the need for additional procedures.  We discussed how it is not uncommon for there to be transient hypoparathyroidism and  hypocalcemia following surgery for hyperparathyroidism while we await return of function of the other glands, meaning that she may have surgery for high calcium levels and be discharged on calcium supplements.  She expressed understanding.  I discussed that the benefit would be surgical correction of tertiary hyperparathyroidism. The chief alternative would be medical management or observation as above. This operation could take anywhere from 30 minutes to over 4 hours, depending on the anatomy and gland issues.  Time was allowed for questions, and all questions were answered to the patient's apparent satisfaction.       Surgery has been scheduled for December 21. Paperwork will be signed on the morning of surgery.

## 2021-12-23 NOTE — PROGRESS NOTES
"Patient presented to preop with c/o "a scratchy sore throat" and requests that an MD assess her before surgery, on call resident paged, will await return call.  "

## 2021-12-23 NOTE — ANESTHESIA PROCEDURE NOTES
Arterial    Diagnosis: parathyroidectomy    Patient location during procedure: done in OR    Staffing  Authorizing Provider: Nancy Garrett MD  Performing Provider: Felipe Choi MD    Anesthesiologist was present at the time of the procedure.    Preanesthetic Checklist  Completed: patient identified, IV checked, site marked, risks and benefits discussed, surgical consent, monitors and equipment checked, pre-op evaluation, timeout performed and anesthesia consent givenArterial  Skin Prep: chlorhexidine gluconate  Orientation: left  Location: radial    Catheter Size: 22 G  Catheter placement by Ultrasound guidance. Heme positive aspiration all ports.  Vessel Caliber: small, patent, compressibility normal  Needle advanced into vessel with real time Ultrasound guidance.Insertion Attempts: 1  Assessment  Dressing: secured with tape and tegaderm  Patient: Tolerated well

## 2021-12-23 NOTE — TRANSFER OF CARE
"Anesthesia Transfer of Care Note    Patient: Claudette Louise Voss    Procedure(s) Performed: Procedure(s) (LRB):  PARATHYROIDECTOMY (Bilateral)    Patient location: PACU    Anesthesia Type: general    Transport from OR: Transported from OR on 100% O2 by closed face mask with adequate spontaneous ventilation    Post pain: adequate analgesia    Post assessment: no apparent anesthetic complications and tolerated procedure well    Post vital signs: stable    Level of consciousness: sedated and responds to stimulation    Nausea/Vomiting: no nausea/vomiting    Complications: none    Transfer of care protocol was followed      Last vitals:   Visit Vitals  BP (!) 118/55 (BP Location: Left arm, Patient Position: Lying)   Pulse 66   Temp 36.8 °C (98.2 °F) (Oral)   Resp 18   Ht 4' 11" (1.499 m)   Wt 59 kg (130 lb)   LMP  (LMP Unknown)   SpO2 98%   Breastfeeding No   BMI 26.26 kg/m²     "

## 2021-12-24 VITALS
HEART RATE: 71 BPM | DIASTOLIC BLOOD PRESSURE: 56 MMHG | TEMPERATURE: 98 F | HEIGHT: 59 IN | SYSTOLIC BLOOD PRESSURE: 118 MMHG | BODY MASS INDEX: 26.21 KG/M2 | WEIGHT: 130 LBS | RESPIRATION RATE: 17 BRPM | OXYGEN SATURATION: 92 %

## 2021-12-24 PROCEDURE — 63600175 PHARM REV CODE 636 W HCPCS: Performed by: STUDENT IN AN ORGANIZED HEALTH CARE EDUCATION/TRAINING PROGRAM

## 2021-12-24 PROCEDURE — 25000003 PHARM REV CODE 250: Performed by: STUDENT IN AN ORGANIZED HEALTH CARE EDUCATION/TRAINING PROGRAM

## 2021-12-24 PROCEDURE — 94761 N-INVAS EAR/PLS OXIMETRY MLT: CPT

## 2021-12-24 RX ORDER — HYDROCODONE BITARTRATE AND ACETAMINOPHEN 5; 325 MG/1; MG/1
1 TABLET ORAL EVERY 6 HOURS PRN
Qty: 15 TABLET | Refills: 0 | Status: SHIPPED | OUTPATIENT
Start: 2021-12-24 | End: 2022-01-31

## 2021-12-24 RX ORDER — ONDANSETRON 8 MG/1
8 TABLET, ORALLY DISINTEGRATING ORAL EVERY 8 HOURS PRN
Qty: 15 TABLET | Refills: 0 | Status: SHIPPED | OUTPATIENT
Start: 2021-12-24 | End: 2022-01-31

## 2021-12-24 RX ORDER — CEPHALEXIN 500 MG/1
500 CAPSULE ORAL EVERY 12 HOURS
Qty: 14 CAPSULE | Refills: 0 | Status: SHIPPED | OUTPATIENT
Start: 2021-12-24 | End: 2021-12-31

## 2021-12-24 RX ADMIN — MYCOPHENILIC ACID 180 MG: 180 TABLET, DELAYED RELEASE ORAL at 09:12

## 2021-12-24 RX ADMIN — LEVOTHYROXINE SODIUM 88 MCG: 88 TABLET ORAL at 06:12

## 2021-12-24 RX ADMIN — TACROLIMUS 1.5 MG: 1 CAPSULE ORAL at 09:12

## 2021-12-24 RX ADMIN — NIFEDIPINE 30 MG: 30 TABLET, FILM COATED, EXTENDED RELEASE ORAL at 09:12

## 2021-12-24 RX ADMIN — Medication 400 MG: at 09:12

## 2021-12-24 RX ADMIN — ATORVASTATIN CALCIUM 20 MG: 20 TABLET, FILM COATED ORAL at 09:12

## 2021-12-24 RX ADMIN — CHOLECALCIFEROL TAB 25 MCG (1000 UNIT) 1000 UNITS: 25 TAB at 09:12

## 2021-12-24 RX ADMIN — CEPHALEXIN 500 MG: 500 CAPSULE ORAL at 09:12

## 2021-12-24 RX ADMIN — CARVEDILOL 6.25 MG: 6.25 TABLET, FILM COATED ORAL at 09:12

## 2021-12-24 RX ADMIN — KETOROLAC TROMETHAMINE 1 DROP: 5 SOLUTION/ DROPS OPHTHALMIC at 09:12

## 2021-12-24 NOTE — ANESTHESIA RELEASE NOTE
"Anesthesia Release from PACU Note    Patient: Claudette Louise Voss    Procedure(s) Performed: Procedure(s) (LRB):  PARATHYROIDECTOMY (Bilateral)    Anesthesia type: general    Post pain: Adequate analgesia    Post assessment: no apparent anesthetic complications    Last Vitals:   Visit Vitals  BP (!) 153/72   Pulse 81   Temp 37 °C (98.6 °F)   Resp 15   Ht 4' 11" (1.499 m)   Wt 59 kg (130 lb)   LMP  (LMP Unknown)   SpO2 (!) 94%   Breastfeeding No   BMI 26.26 kg/m²       Post vital signs: stable    Level of consciousness: awake, alert  and oriented    Nausea/Vomiting: no nausea/no vomiting    Complications: none    Airway Patency: patent    Respiratory: unassisted    Cardiovascular: stable and blood pressure at baseline    Hydration: euvolemic  "

## 2021-12-24 NOTE — HOSPITAL COURSE
65 y/o F with h/o tertiary hyperparathyroidism s/p 4 gland exploration with removal of 3 parathyroid glands. Post op PTH and ionized calcium wnl. Observed overnight, did well. Denies perioral or fingertip numbness/tingling. Hemodynamically stable and ready for discharge.    NAD  Neck incision c/d/I, no erythema or drainage  BINA drain with SS output  Normal WOB, no stridor

## 2021-12-24 NOTE — ANESTHESIA POSTPROCEDURE EVALUATION
Anesthesia Post Evaluation    Patient: Claudette Louise Voss    Procedure(s) Performed: Procedure(s) (LRB):  PARATHYROIDECTOMY (Bilateral)    Final Anesthesia Type: general      Patient location during evaluation: PACU  Patient participation: Yes- Able to Participate  Level of consciousness: awake and alert  Post-procedure vital signs: reviewed and stable  Pain management: adequate  Airway patency: patent    PONV status at discharge: No PONV  Anesthetic complications: no      Cardiovascular status: blood pressure returned to baseline  Respiratory status: unassisted  Hydration status: euvolemic  Follow-up not needed.          Vitals Value Taken Time   /70 12/23/21 1831   Temp 37 °C (98.6 °F) 12/23/21 1715   Pulse 73 12/23/21 1841   Resp 12 12/23/21 1841   SpO2 97 % 12/23/21 1841   Vitals shown include unvalidated device data.      No case tracking events are documented in the log.      Pain/Ryan Score: Pain Rating Prior to Med Admin: 8 (12/23/2021  5:44 PM)  Ryan Score: 9 (12/23/2021  6:09 PM)

## 2021-12-24 NOTE — PLAN OF CARE
Patient overall did very well overnight. Very little swelling noted to neck and surrounding tissue. Ice pack kept on it throughout the night. She has some bruising noted which is to be expected but monitoring for any complications. Tolerating diet at this time and is able to take medications without issue.  at bedside. No issues to report.

## 2021-12-24 NOTE — OP NOTE
Date of Operation: 12/23/2021    Surgeon: MIKE ARREOLA     Assistants: Ramón Rodas (RES)    Anesthesia: General    ASA Class: 2 = Mild systemic disease    Preoperative Diagnosis:   1) Tertiary hyperparathyroidism  2) History of kidney transplantation    Postoperative diagnosis:  1) Tertiary hyperparathyroidism, 4 gland hyperplasia  2) History of kidney transplantation    Procedures performed:  1) Parathyroidectomy (right superior, right inferior, left inferior)    Closing Set: No    Indications for operation:  Claudette Louise Voss is a 66 y.o. female who presented with hyperparathyroidism and hypercalcemia. Preoperative localization studies suggested bilateral inferior adenomas, but 4-gland hyperplasia was suspected because of her history of polycystic kidney disease and transplantation.    The risks, benefits, indications, and alternatives to this procedure were thoroughly discussed with the patient preoperatively, and informed consent was obtained. Please see my detailed preoperative notes for a thorough account of this discussion.    Findings:   The right superior parathyroid gland was right upper pole, 203 mg.   The right inferior gland was right lower pole, 267 mg.  The left superior gland was left upper pole, in the thymus, and weighed 593mg.  The left inferior gland was left lower pole.  The baseline PTH level at induction was 249.4.  Post-excision levels dropped to 76.5, so the procedure was terminated in order to decrease her risk of further drop and hypocalcemia/hypoparathyroidism. The post-excision value was obtained 15 minutes after excision of the three largest glands.  Frozen section assessment revealed hyperplastic parathyroid tissue x 3.    EBL: 10 - 20 cc    IVF:  See anesthesia record    Detailed Account of Technique Employed:    The patient was brought into the operating room and placed supine on the operating table. The patient was intubated transorally by the anesthesiology service, and an  adequate level of general endotracheal anesthesia was obtained with the NIM tube - adequate functioning was confirmed with gentle palpation of the thyroid ala to elicit a confirmatory response. The patient's neck and chest were prepped and draped in the standard, sterile fashion. A timeout was performed according to the universal protocol.     A suitable skin crease was identified about 2 cm cephalad to the sternal notch, and a 2cm incision was marked and injected with 1% lidocaine with 1:100,000 epinephrine. A baseline PTH level was obtained. The skin and subcutaneous tissues were divided, and the platysma/superficial layer of cervical fascia incised prior to elevating subplatysmal flaps to the thyroid notch superiorly and the sternal notch inferiorly. The median raphe was identified, and the straps elevated off the right lobe of the thyroid gland. We dissected in the right superior region first, after working lateral to the gland. The right superior gland was markedly enlarged. Dissection proceeded to the inferior gland, which was located ventral to the RLN and was also markedly enlarged, even bigger than the superior gland.     The left side was explored, revealing again a slightly enlarged left superior parathyroid and a markedly enlarged left inferior parathyroid within the thymic remnant. The left inferior gland was removed, and the left superior gland remained in situ - it is just lateral to the RLN in the region of the tubercle of Zuckerkandl. The left inferior gland was again noted to be hypercellular and enlarged, and the 15 minute post-excision level was 76.5. The procedure was therefore terminated because the PTH was more than cut in half and had normalized. The remaining left superior parathyroid was only slightly enlarged, and I did not want to make her hypopara and deal with hypocalcemia, so I elected to terminate the procedure. We can follow her levels over time and determine if a revision operation  is indicated, but I expected continued decreases in the PTH level and, again, did not want to make her hypoparathyroid.     Multiple valsalva maneuvers were performed, and additional hemostasis achieved as necessary with the bipolar. Some surgicel was placed in the thyroid bed, and the wound was closed in layers over a 10Fr round drain - her thyroid was very firm, nodular, hypervascular, and friable. 3-0 vicryl was used to reapproximate the fascia of the strap muscles and the platysma, followed by interrupted, buried, 4-0 monocryl in the deep dermal layer. Dermabond was applied to the skin. There were appropriate (over 300 microvolt) responses from each RLN with stimulation at 1mA of intensity on the NIM system.     The patient was allowed to awaken from anesthesia, extubated, and taken to the PACU in stable condition.     All sponge, needle, and instrument counts were correct at the end of the procedure x 2.     I was present for and performed all portions of the operation as noted above.

## 2021-12-24 NOTE — PLAN OF CARE
AAOx4, VSS, RA. Vasu drain to L neck, Drain care reviewed with pt. Denies pain. AVS reviewed. Meds picked up by . D/c via wheelchair.

## 2021-12-24 NOTE — DISCHARGE SUMMARY
[facility]  Otorhinolaryngology-Head & Neck Surgery  Discharge Summary      Patient Name: Claudette Louise Voss  MRN: [id number]  Admission Date: 12/23/2021  Hospital Length of Stay: 0 days  Discharge Date and Time:  12/24/2021 7:55 AM  Attending Physician: Gera Frank MD   Discharging Provider: Amadeo Rodas MD  Primary Care Provider: Gera Arora MD    HPI:   No notes on file    Procedure(s) (LRB):  PARATHYROIDECTOMY (Bilateral)      Indwelling Lines/Drains at time of discharge:   Lines/Drains/Airways     Drain                 Hemodialysis AV Fistula -- days         Closed/Suction Drain 01/16/15 Right Abdomen Bulb 15 Fr. 2534 days         Closed/Suction Drain 12/23/21 1602 Left Other (Comment) Bulb 10 Fr. <1 day              Hospital Course: 65 y/o F with h/o tertiary hyperparathyroidism s/p 4 gland exploration with removal of 3 parathyroid glands. Post op PTH and ionized calcium wnl. Observed overnight, did well. Denies perioral or fingertip numbness/tingling. Hemodynamically stable and ready for discharge.    NAD  Neck incision c/d/I, no erythema or drainage  BINA drain with SS output  Normal WOB, no stridor      Goals of Care Treatment Preferences:  Code Status: Full Code      Consults:     Significant Diagnostic Studies: none    Pending Diagnostic Studies:     Procedure Component Value Units Date/Time    Specimen to Pathology, Surgery ENT [[id number]] Collected: 12/23/21 1622    Order Status: Sent Lab Status: In process Updated: 12/23/21 2132        Final Active Diagnoses:    Diagnosis Date Noted POA    PRINCIPAL PROBLEM:  Tertiary hyperparathyroidism [E21.2]  Yes      Problems Resolved During this Admission:      Discharged Condition: good    Disposition: Home or Self Care    Follow Up:   Follow-up Information     Soniya Johns NP On 12/27/2021.    Specialty: Otolaryngology  Contact information:  [address]  [facility] [address] [phone]                       Patient Instructions:       Diet Adult Regular     Other restrictions (specify):   Order Comments: No heavy lifting (no more than 10 lbs) for 2 weeks, no strenuous activity     Notify your health care provider if you experience any of the following:  severe uncontrolled pain     Notify your health care provider if you experience any of the following:  redness, tenderness, or signs of infection (pain, swelling, redness, odor or green/yellow discharge around incision site)     Notify your health care provider if you experience any of the following:  difficulty breathing or increased cough     Notify your health care provider if you experience any of the following:   Order Comments: Numbness/tingling in fingertips or around mouth     No dressing needed   Order Comments: Nurse to provide instructions on drain care, do not get area over drain wet. Ok to get incision wet but do not scrub. Skin glue will peel off on its own over time.     Medications:  Reconciled Home Medications:      Medication List      START taking these medications    cephALEXin 500 MG capsule  Commonly known as: KEFLEX  Take 1 capsule (500 mg total) by mouth every 12 (twelve) hours. for 7 days     HYDROcodone-acetaminophen 5-325 mg per tablet  Commonly known as: NORCO  Take 1 tablet by mouth every 6 (six) hours as needed for Pain.     ondansetron 8 MG Tbdl  Commonly known as: ZOFRAN-ODT  Take 1 tablet (8 mg total) by mouth every 8 (eight) hours as needed (nausea).        CONTINUE taking these medications    artificial tears(hypromellose)(GENTEAL/SUSTANE) 0.3 % Gel  Commonly known as: Systane GeL  Apply 2 drops to eye 3 (three) times daily as needed.     atorvastatin 20 MG tablet  Commonly known as: LIPITOR  Take 20 mg by mouth once daily.     brimonidine 0.15 % OPTH DROP 0.15 % ophthalmic solution  Commonly known as: ALPHAGAN  Place 1 drop into the left eye 2 (two) times daily.     carvediloL 6.25 MG tablet  Commonly known as: COREG  Take 1 tablet (6.25 mg total) by mouth 2  (two) times daily.     FLUoxetine 20 MG capsule  Commonly known as: PROzac  Take 1 capsule (20 mg total) by mouth every evening.     ketorolac 0.5% 0.5 % Drop  Commonly known as: ACULAR  Place 1 drop into the right eye once daily.     levothyroxine 88 MCG tablet  Commonly known as: SYNTHROID  Take 88 mcg by mouth once daily.     magnesium oxide 400 mg (241.3 mg magnesium) tablet  Commonly known as: MAG-OX  Take 400 mg by mouth 2 (two) times daily.     mycophenolate 180 MG Tbec  Commonly known as: MYFORTIC  Take 1 tablet (180 mg total) by mouth 2 (two) times daily.     NIFEdipine 30 MG (OSM) 24 hr tablet  Commonly known as: PROCARDIA-XL  Take 1 tablet (30 mg total) by mouth once daily.     tacrolimus 0.5 MG Cap  Commonly known as: PROGRAF  Take 3 capsules (1.5 mg total) by mouth every morning AND 3 capsules (1.5 mg total) every evening.     vitamin D 1000 units Tab  Commonly known as: VITAMIN D3  Take 1,000 Units by mouth 2 (two) times daily.          Time spent on the discharge of patient: 20 minutes    Amadeo Rodas MD  Otorhinolaryngology-Head & Neck Surgery  [facility]

## 2021-12-24 NOTE — NURSING TRANSFER
Nursing Transfer Note      12/23/2021   Transfer To: 1028    Transfer via stretcher    Transfer with cardiac monitoring, continuous pulse ox, 2L NC    Transported by pt. transport    Chart send with patient: Yes    Notified: spouse    Patient reassessed at: 1916    Report was called to [facility]

## 2021-12-27 ENCOUNTER — OFFICE VISIT (OUTPATIENT)
Dept: OTOLARYNGOLOGY | Facility: CLINIC | Age: 66
End: 2021-12-27
Payer: MEDICARE

## 2021-12-27 VITALS — BODY MASS INDEX: 26.26 KG/M2 | WEIGHT: 130 LBS

## 2021-12-27 DIAGNOSIS — E21.2 TERTIARY HYPERPARATHYROIDISM: Primary | ICD-10-CM

## 2021-12-27 PROCEDURE — 99999 PR PBB SHADOW E&M-EST. PATIENT-LVL III: ICD-10-PCS | Mod: PBBFAC,,, | Performed by: NURSE PRACTITIONER

## 2021-12-27 PROCEDURE — 99024 PR POST-OP FOLLOW-UP VISIT: ICD-10-PCS | Mod: POP,,, | Performed by: NURSE PRACTITIONER

## 2021-12-27 PROCEDURE — 99999 PR PBB SHADOW E&M-EST. PATIENT-LVL III: CPT | Mod: PBBFAC,,, | Performed by: NURSE PRACTITIONER

## 2021-12-27 PROCEDURE — 99024 POSTOP FOLLOW-UP VISIT: CPT | Mod: POP,,, | Performed by: NURSE PRACTITIONER

## 2021-12-27 PROCEDURE — 99213 OFFICE O/P EST LOW 20 MIN: CPT | Mod: PBBFAC | Performed by: NURSE PRACTITIONER

## 2022-01-04 ENCOUNTER — PATIENT MESSAGE (OUTPATIENT)
Dept: OTOLARYNGOLOGY | Facility: CLINIC | Age: 67
End: 2022-01-04
Payer: MEDICARE

## 2022-01-04 LAB
COMMENT: NORMAL
FINAL PATHOLOGIC DIAGNOSIS: NORMAL
FROZEN SECTION DIAGNOSIS: NORMAL
FROZEN SECTION FOOTNOTE: NORMAL
GROSS: NORMAL
Lab: NORMAL

## 2022-01-10 ENCOUNTER — OFFICE VISIT (OUTPATIENT)
Dept: OTOLARYNGOLOGY | Facility: CLINIC | Age: 67
End: 2022-01-10
Payer: MEDICARE

## 2022-01-10 VITALS — HEART RATE: 65 BPM | DIASTOLIC BLOOD PRESSURE: 70 MMHG | TEMPERATURE: 98 F | SYSTOLIC BLOOD PRESSURE: 131 MMHG

## 2022-01-10 DIAGNOSIS — E83.52 HYPERCALCEMIA: Primary | ICD-10-CM

## 2022-01-10 DIAGNOSIS — R09.82 POST-NASAL DRIP: ICD-10-CM

## 2022-01-10 DIAGNOSIS — E21.2 TERTIARY HYPERPARATHYROIDISM: Primary | ICD-10-CM

## 2022-01-10 LAB
CTP QC/QA: YES
SARS-COV-2 RDRP RESP QL NAA+PROBE: NEGATIVE

## 2022-01-10 PROCEDURE — 99024 POSTOP FOLLOW-UP VISIT: CPT | Mod: POP,,, | Performed by: NURSE PRACTITIONER

## 2022-01-10 PROCEDURE — 99999 PR PBB SHADOW E&M-EST. PATIENT-LVL III: CPT | Mod: PBBFAC,,, | Performed by: NURSE PRACTITIONER

## 2022-01-10 PROCEDURE — 99999 PR PBB SHADOW E&M-EST. PATIENT-LVL III: ICD-10-PCS | Mod: PBBFAC,,, | Performed by: NURSE PRACTITIONER

## 2022-01-10 PROCEDURE — 99024 PR POST-OP FOLLOW-UP VISIT: ICD-10-PCS | Mod: POP,,, | Performed by: NURSE PRACTITIONER

## 2022-01-10 PROCEDURE — U0002 COVID-19 LAB TEST NON-CDC: HCPCS | Mod: PBBFAC | Performed by: NURSE PRACTITIONER

## 2022-01-10 PROCEDURE — 99213 OFFICE O/P EST LOW 20 MIN: CPT | Mod: PBBFAC | Performed by: NURSE PRACTITIONER

## 2022-01-10 RX ORDER — FLUTICASONE PROPIONATE 50 MCG
2 SPRAY, SUSPENSION (ML) NASAL DAILY
Qty: 16 G | Refills: 5 | Status: SHIPPED | OUTPATIENT
Start: 2022-01-10 | End: 2022-04-20

## 2022-01-10 NOTE — PROGRESS NOTES
Subjective:       Patient ID: Claudette Louise Voss is a 66 y.o. female.    Chief Complaint: Other (Post op) and Sore Throat    H&P:     Claudette Louise Voss returns for a post op visit. She has been doing well since her surgery. Her pain is well controlled. She does report persistent throat pain and post nasal drip that were present before surgery. She denies fever, chills, or drainage from incisions    Past Medical History:   Diagnosis Date    Breast cancer - 2004 11/29/2013    Chronic immunosuppression with Prograf and MMF 1/16/2015    CKD (chronic kidney disease) stage 3, GFR 30-59 ml/min     Colon polyps 11/29/2013    ESRD (end stage renal disease) 11/29/2013    GERD (gastroesophageal reflux disease) 11/29/2013    Glaucoma 11/29/2013    Hyperlipidemia 11/29/2013    Hypertension 11/29/2013    Hypothyroidism 11/29/2013    Malignant neoplasm of upper-outer quadrant of left female breast 8/25/2004    PKD (polycystic kidney disease) - S/P nephrectomies 11/29/2013    Renal hypertension        Past Surgical History:   Procedure Laterality Date    AV FISTULA PLACEMENT      multiple access     BREAST LUMPECTOMY  2004    CHOLECYSTECTOMY  1987    open    COLONOSCOPY      HERNIA REPAIR  2012    KIDNEY TRANSPLANT      LYMPH NODE DISSECTION  2004    with breast cancer treatment    NEPHRECTOMY  2012    bilateral    PARATHYROIDECTOMY Bilateral 12/23/2021    Procedure: PARATHYROIDECTOMY;  Surgeon: Gera Frank MD;  Location: [facility] OR [address] [place] [address];  Service: ENT;  Laterality: Bilateral;    SPLENECTOMY, TOTAL  2012         Current Outpatient Medications:     artificial tears,hypromellose, (SYSTANE GEL) 0.3 % Gel, Apply 2 drops to eye 3 (three) times daily as needed., Disp: , Rfl: 0    atorvastatin (LIPITOR) 20 MG tablet, Take 20 mg by mouth once daily., Disp: , Rfl:     brimonidine 0.15 % OPTH DROP (ALPHAGAN) 0.15 % ophthalmic solution, Place 1 drop into the left eye 2 (two) times daily., Disp: , Rfl:      carvedilol (COREG) 6.25 MG tablet, Take 1 tablet (6.25 mg total) by mouth 2 (two) times daily., Disp: 180 tablet, Rfl: 3    fluoxetine (PROZAC) 20 MG capsule, Take 1 capsule (20 mg total) by mouth every evening., Disp: 30 capsule, Rfl: 5    fluticasone propionate (FLONASE) 50 mcg/actuation nasal spray, 2 sprays (100 mcg total) by Each Nostril route once daily., Disp: 16 g, Rfl: 5    HYDROcodone-acetaminophen (NORCO) 5-325 mg per tablet, Take 1 tablet by mouth every 6 (six) hours as needed for Pain., Disp: 15 tablet, Rfl: 0    ketorolac 0.5% (ACULAR) 0.5 % Drop, Place 1 drop into the right eye once daily., Disp: , Rfl:     levothyroxine (SYNTHROID) 88 MCG tablet, Take 88 mcg by mouth once daily., Disp: , Rfl:     magnesium oxide (MAG-OX) 400 mg (241.3 mg magnesium) tablet, Take 400 mg by mouth 2 (two) times daily., Disp: , Rfl:     mycophenolate (MYFORTIC) 180 MG TbEC, Take 1 tablet (180 mg total) by mouth 2 (two) times daily., Disp: 60 tablet, Rfl: 11    nifedipine 30 MG ORAL TR24 (PROCARDIA-XL) 30 MG (OSM) 24 hr tablet, Take 1 tablet (30 mg total) by mouth once daily., Disp: 90 tablet, Rfl: 4    ondansetron (ZOFRAN-ODT) 8 MG TbDL, Take 1 tablet (8 mg total) by mouth every 8 (eight) hours as needed (nausea)., Disp: 15 tablet, Rfl: 0    tacrolimus (PROGRAF) 0.5 MG Cap, Take 3 capsules (1.5 mg total) by mouth every morning AND 3 capsules (1.5 mg total) every evening., Disp: 180 capsule, Rfl: 11    vitamin D (VITAMIN D3) 1000 units Tab, Take 1,000 Units by mouth 2 (two) times daily., Disp: , Rfl:     Review of patient's allergies indicates:   Allergen Reactions    Cayenne Other (See Comments)    Acetaminophen Rash    Aspirin Rash    Vancomycin Rash    Yeast, dried Rash       Social History     Socioeconomic History    Marital status:    Tobacco Use    Smoking status: Never Smoker    Smokeless tobacco: Never Used   Substance and Sexual Activity    Alcohol use: No    Drug use: No   Social  History Narrative     3 children.    History of blood transfusions       Family History   Problem Relation Age of Onset    Stroke Mother     Diabetes Mother     Kidney disease Father         PKD    Kidney disease Sister         PKD    Cancer Neg Hx          Review of Systems   Constitutional: Negative for appetite change, chills, diaphoresis, fatigue, fever and unexpected weight change.   HENT: Positive for sore throat. Negative for nasal congestion, dental problem, drooling, ear discharge, ear pain, facial swelling, hearing loss, mouth sores, nosebleeds, postnasal drip, rhinorrhea, sinus pressure/congestion, sneezing, tinnitus, trouble swallowing and voice change.    Eyes: Negative for pain, discharge, redness and itching.   Respiratory: Negative for cough and shortness of breath.    Cardiovascular: Negative for chest pain.   Gastrointestinal: Negative for abdominal distention, abdominal pain, diarrhea, nausea and vomiting.   Endocrine: Negative for cold intolerance and heat intolerance.   Genitourinary: Negative for difficulty urinating.   Musculoskeletal: Negative for neck pain and neck stiffness.   Integumentary:  Negative for rash.   Allergic/Immunologic: Positive for food allergies.   Neurological: Negative for dizziness, weakness and headaches.   Hematological: Negative for adenopathy.         Objective:      Physical Exam  Constitutional:       Appearance: Normal appearance.   HENT:      Head: Normocephalic and atraumatic.      Right Ear: External ear normal.      Left Ear: External ear normal.      Mouth/Throat:      Comments: Procedure: Flexible laryngoscopy  In order to fully examine the upper aerodigestive tract, including the larynx, in a patient with a hyperactive gag reflex, flexible endoscopy is required.  After explaining the procedure and obtaining verbal consent, a timeout was performed with the patient's participation according to the universal protocol. Both nasal cavities were  anesthetized with 4% Xylocaine spray mixed with Nam-Synephrine. The flexible laryngoscope (#[id number]) was inserted into the nasal cavity and advanced to visualize the nasal cavity, nasopharynx, the posterior oropharynx, hypopharynx, and the endolarynx with the above findings noted. The scope was removed and the procedure terminated. The patient tolerated this procedure well without apparent complication.      FINDINGS  Nasopharynx - the torus is clear. There are no lesions of the posterior wall.   Oropharynx - no lesions of the tongue base. There is no obvious fullness or asymmetry.  Hypopharynx - there are no lesions of the pyriform sinuses or postcricoid region    Larynx - there are no lesions of the supraglottic or glottic larynx. Vocal fold mobility is normal with complete closure.      Moderate amounts of clear thick mucus.    Neck:      Comments: BINA drain with scant amount of serosanguinous drainage.  Drain and suture removed without difficulty.  Dermabond noted to incision.  Incision CDI with no redness or swelling noted.    Pulmonary:      Effort: Pulmonary effort is normal. No respiratory distress.   Neurological:      General: No focal deficit present.      Mental Status: She is alert.   Psychiatric:         Mood and Affect: Mood normal.         Behavior: Behavior normal.         Thought Content: Thought content normal.         Assessment:       Problem List Items Addressed This Visit        ENT    Post-nasal drip     Flonase prescribed.            Endocrine    Tertiary hyperparathyroidism - Primary     Healing well. Will check PTH and calcium. Questions answered.         Relevant Orders    CALCIUM, IONIZED    PTH, intact    POCT COVID-19 Rapid Screening (Completed)          Plan:       Problem List Items Addressed This Visit        ENT    Post-nasal drip     Flonase prescribed.            Endocrine    Tertiary hyperparathyroidism - Primary     Healing well. Will check PTH and calcium. Questions answered.          Relevant Orders    CALCIUM, IONIZED    PTH, intact    POCT COVID-19 Rapid Screening (Completed)

## 2022-01-14 DIAGNOSIS — Z94.0 KIDNEY REPLACED BY TRANSPLANT: ICD-10-CM

## 2022-01-14 RX ORDER — MYCOPHENOLIC ACID 180 MG/1
180 TABLET, DELAYED RELEASE ORAL 2 TIMES DAILY
Qty: 60 TABLET | Refills: 11 | Status: SHIPPED | OUTPATIENT
Start: 2022-01-14 | End: 2023-01-26

## 2022-01-25 ENCOUNTER — PATIENT MESSAGE (OUTPATIENT)
Dept: OTOLARYNGOLOGY | Facility: CLINIC | Age: 67
End: 2022-01-25
Payer: MEDICARE

## 2022-01-27 NOTE — ADDENDUM NOTE
Addendum  created 01/27/22 0911 by Nancy Garrett MD    Attestation recorded in Intraprocedure, Intraprocedure Attestations filed

## 2022-01-31 ENCOUNTER — OFFICE VISIT (OUTPATIENT)
Dept: TRANSPLANT | Facility: CLINIC | Age: 67
End: 2022-01-31
Payer: MEDICARE

## 2022-01-31 VITALS
RESPIRATION RATE: 16 BRPM | HEIGHT: 59 IN | SYSTOLIC BLOOD PRESSURE: 114 MMHG | DIASTOLIC BLOOD PRESSURE: 57 MMHG | TEMPERATURE: 98 F | BODY MASS INDEX: 27.38 KG/M2 | WEIGHT: 135.81 LBS | HEART RATE: 65 BPM | OXYGEN SATURATION: 94 %

## 2022-01-31 DIAGNOSIS — Z94.0 KIDNEY REPLACED BY TRANSPLANT: ICD-10-CM

## 2022-01-31 DIAGNOSIS — Z94.0 STATUS POST DECEASED-DONOR KIDNEY TRANSPLANTATION: Primary | Chronic | ICD-10-CM

## 2022-01-31 DIAGNOSIS — E83.52 HYPERCALCEMIA: ICD-10-CM

## 2022-01-31 DIAGNOSIS — I12.9 RENAL HYPERTENSION: Chronic | ICD-10-CM

## 2022-01-31 DIAGNOSIS — Z29.89 NEED FOR PROPHYLACTIC IMMUNOTHERAPY: Chronic | ICD-10-CM

## 2022-01-31 DIAGNOSIS — Q61.3 PKD (POLYCYSTIC KIDNEY DISEASE): ICD-10-CM

## 2022-01-31 DIAGNOSIS — N18.2 CKD (CHRONIC KIDNEY DISEASE) STAGE 2, GFR 60-89 ML/MIN: ICD-10-CM

## 2022-01-31 PROCEDURE — 99999 PR PBB SHADOW E&M-EST. PATIENT-LVL IV: ICD-10-PCS | Mod: PBBFAC,,, | Performed by: NURSE PRACTITIONER

## 2022-01-31 PROCEDURE — 99215 OFFICE O/P EST HI 40 MIN: CPT | Mod: S$PBB,,, | Performed by: NURSE PRACTITIONER

## 2022-01-31 PROCEDURE — 99215 PR OFFICE/OUTPT VISIT, EST, LEVL V, 40-54 MIN: ICD-10-PCS | Mod: S$PBB,,, | Performed by: NURSE PRACTITIONER

## 2022-01-31 PROCEDURE — 99999 PR PBB SHADOW E&M-EST. PATIENT-LVL IV: CPT | Mod: PBBFAC,,, | Performed by: NURSE PRACTITIONER

## 2022-01-31 PROCEDURE — 99214 OFFICE O/P EST MOD 30 MIN: CPT | Mod: PBBFAC | Performed by: NURSE PRACTITIONER

## 2022-01-31 RX ORDER — TACROLIMUS 0.5 MG/1
CAPSULE ORAL
Qty: 180 CAPSULE | Refills: 11 | Status: SHIPPED | OUTPATIENT
Start: 2022-01-31 | End: 2023-04-10 | Stop reason: SDUPTHER

## 2022-01-31 NOTE — PROGRESS NOTES
"   Kidney Post-Transplant Assessment    Referring Physician: Nicolas Bustillo  Current Nephrologist: Felix Fuchs    ORGAN: RIGHT KIDNEY  Donor Type:  - brain death  PHS Increased Risk: no  Cold Ischemia: 348 mins  Induction Medications: campath - alemtuzumab (anti-cd52)    Subjective:     CC:  Reassessment of renal allograft function and management of chronic immunosuppression.    HPI:  Ms. Vicente is a 66 y.o. year old White female who received a  - brain death kidney transplant on 1/16/15.  She has CKD stage 3 - GFR 30-59 and her baseline creatinine is between 1.0-1.2. She takes mycophenolic acid and tacrolimus for maintenance immunosuppression. She denies any recent hospitalizations or ER visits since her previous clinic visit.    Hospitalization/ ED visits  None    Interval HX:  Hospitalization   Parathyroidectomy  Reports getting a URI since hospitalizations and some s/s have lingered: cough, congestion, "fluid behind ears." Has not followed up with PCP  Taking antihistamine , Flonase nasal spray .   Patient reports she is doing well.  She is following with her PC and Nephrologist regularly and endocrinology for hypercalcemia  Intake  UOP  Has lost weight and kept it off ~ 56 lbs  Peripheral edema  Lab /diagnostic results reviewed with patient today.  All questions answered        Current Outpatient Medications:     artificial tears,hypromellose, (SYSTANE GEL) 0.3 % Gel, Apply 2 drops to eye 3 (three) times daily as needed., Disp: , Rfl: 0    atorvastatin (LIPITOR) 20 MG tablet, Take 20 mg by mouth once daily., Disp: , Rfl:     brimonidine 0.15 % OPTH DROP (ALPHAGAN) 0.15 % ophthalmic solution, Place 1 drop into the left eye 2 (two) times daily., Disp: , Rfl:     carvedilol (COREG) 6.25 MG tablet, Take 1 tablet (6.25 mg total) by mouth 2 (two) times daily., Disp: 180 tablet, Rfl: 3    fluoxetine (PROZAC) 20 MG capsule, Take 1 capsule (20 mg total) by mouth every evening., Disp: " 30 capsule, Rfl: 5    fluticasone propionate (FLONASE) 50 mcg/actuation nasal spray, 2 sprays (100 mcg total) by Each Nostril route once daily., Disp: 16 g, Rfl: 5    ketorolac 0.5% (ACULAR) 0.5 % Drop, Place 1 drop into the right eye once daily., Disp: , Rfl:     levothyroxine (SYNTHROID) 88 MCG tablet, Take 88 mcg by mouth once daily., Disp: , Rfl:     magnesium oxide (MAG-OX) 400 mg (241.3 mg magnesium) tablet, Take 400 mg by mouth 2 (two) times daily., Disp: , Rfl:     mycophenolate (MYFORTIC) 180 MG TbEC, Take 1 tablet (180 mg total) by mouth 2 (two) times daily., Disp: 60 tablet, Rfl: 11    nifedipine 30 MG ORAL TR24 (PROCARDIA-XL) 30 MG (OSM) 24 hr tablet, Take 1 tablet (30 mg total) by mouth once daily., Disp: 90 tablet, Rfl: 4    tacrolimus (PROGRAF) 0.5 MG Cap, Take 3 capsules (1.5 mg total) by mouth every morning AND 3 capsules (1.5 mg total) every evening., Disp: 180 capsule, Rfl: 11    vitamin D (VITAMIN D3) 1000 units Tab, Take 1,000 Units by mouth 2 (two) times daily., Disp: , Rfl:     HYDROcodone-acetaminophen (NORCO) 5-325 mg per tablet, Take 1 tablet by mouth every 6 (six) hours as needed for Pain., Disp: 15 tablet, Rfl: 0    ondansetron (ZOFRAN-ODT) 8 MG TbDL, Take 1 tablet (8 mg total) by mouth every 8 (eight) hours as needed (nausea)., Disp: 15 tablet, Rfl: 0        Past Medical History:   Diagnosis Date    Breast cancer - 2004 11/29/2013    Chronic immunosuppression with Prograf and MMF 1/16/2015    CKD (chronic kidney disease) stage 3, GFR 30-59 ml/min     Colon polyps 11/29/2013    ESRD (end stage renal disease) 11/29/2013    GERD (gastroesophageal reflux disease) 11/29/2013    Glaucoma 11/29/2013    Hyperlipidemia 11/29/2013    Hypertension 11/29/2013    Hypothyroidism 11/29/2013    Malignant neoplasm of upper-outer quadrant of left female breast 8/25/2004    PKD (polycystic kidney disease) - S/P nephrectomies 11/29/2013    Renal hypertension        Review of Systems  "  Constitutional: Negative for activity change, appetite change, chills, fatigue, fever and unexpected weight change.   HENT: Negative for congestion, facial swelling, postnasal drip, rhinorrhea, sinus pressure, sore throat and trouble swallowing.    Eyes: Negative for pain, redness and visual disturbance.   Respiratory: Positive for apnea. Negative for cough, chest tightness, shortness of breath and wheezing.         HX FELICITA   Cardiovascular: Negative.  Negative for chest pain, palpitations and leg swelling.   Gastrointestinal: Negative for abdominal pain, diarrhea, nausea and vomiting.   Genitourinary: Negative for dysuria, flank pain and urgency.   Musculoskeletal: Negative for gait problem, neck pain and neck stiffness.   Skin: Negative for rash.   Allergic/Immunologic: Positive for immunocompromised state. Negative for environmental allergies and food allergies.   Neurological: Negative for dizziness, weakness, light-headedness and headaches.   Psychiatric/Behavioral: Negative for agitation and confusion. The patient is not nervous/anxious.        Objective:   Blood pressure (!) 114/57, pulse 65, temperature 97.7 °F (36.5 °C), temperature source Temporal, resp. rate 16, height 4' 11" (1.499 m), weight 61.6 kg (135 lb 12.9 oz), SpO2 (!) 94 %.body mass index is 27.43 kg/m².    Physical Exam  Vitals reviewed.   Constitutional:       Appearance: Normal appearance. She is well-developed.   HENT:      Head: Normocephalic.      Mouth/Throat:      Pharynx: No oropharyngeal exudate.   Eyes:      General: No scleral icterus.     Conjunctiva/sclera: Conjunctivae normal.      Pupils: Pupils are equal, round, and reactive to light.   Cardiovascular:      Rate and Rhythm: Normal rate and regular rhythm.      Heart sounds: Normal heart sounds.   Pulmonary:      Effort: Pulmonary effort is normal.      Breath sounds: Normal breath sounds.   Abdominal:      General: Bowel sounds are normal. There is no distension.      " Palpations: Abdomen is soft. There is no hepatomegaly, splenomegaly or mass.      Tenderness: There is no abdominal tenderness. There is no guarding or rebound. Negative signs include Clarke's sign and McBurney's sign.       Musculoskeletal:         General: Normal range of motion.      Cervical back: Normal range of motion and neck supple.   Lymphadenopathy:      Cervical: No cervical adenopathy.   Skin:     General: Skin is warm and dry.   Neurological:      Mental Status: She is alert and oriented to person, place, and time.      Motor: No abnormal muscle tone.      Coordination: Coordination normal.   Psychiatric:         Behavior: Behavior normal.          Labs:  Lab Results   Component Value Date    WBC 6.94 2022    HGB 12.9 2022    HCT 40.2 2022     2022    K 4.6 2022     2022    CO2 26 2022    BUN 35 (H) 2022    CREATININE 0.9 2022    EGFRNONAA >60.0 2022    CALCIUM 9.5 2022    PHOS 3.8 2022    MG 1.5 (L) 2022    ALBUMIN 3.6 2022    ALBUMIN 3.6 2022    AST 19 2022    ALT 17 2022       No results found for: EXTANC, EXTWBC, EXTSEGS, EXTPLATELETS, EXTHEMOGLOBI, EXTHEMATOCRI, EXTCREATININ, EXTSODIUM, EXTPOTASSIUM, EXTBUN, EXTCO2, EXTCALCIUM, EXTPHOSPHORU, EXTGLUCOSE, EXTALBUMIN, EXTAST, EXTALT, EXTBILITOTAL, EXTLIPASE, EXTAMYLASE    No results found for: EXTCYCLOSLVL, EXTSIROLIMUS, EXTTACROLVL, EXTPROTCRE, EXTPTHINTACT, EXTPROTEINUA, EXTWBCUA, EXTRBCUA    Labs were reviewed with the patient.    Assessment:     1. Status post -donor kidney transplant for PKD - 1/16/15    2. Chronic immunosuppression with Prograf and MMF    3. CKD (chronic kidney disease) stage 2, GFR 60-89 ml/min    4. Renal hypertension    5. PKD (polycystic kidney disease) - S/P nephrectomies    6. Hypercalcemia    7. Kidney replaced by transplant        Plan:   URI--follow up with PCP is s/s worsen or do not improve--mgmt  deferred   Continue to follow with PCP (health maintenance and health screening) and General Nephrologist (CKD care)  Need to establish Dermatologist for yearly skin check due to IS therapy.  Encourage to continue lifestyle modifications: diet, exercise, weight loss   Continue current IS therapy regimen   prograf refilled         1. CKD stage 2: will continue follow up as per our center guidelines. patient to continue close follow up with the local General nephrologist. Education provided in appropriate fluid intake, potassium intake. Continue with oral hydration.      2. Immunosuppression: Prograf trough 6.2, which is therapeutic target 4-7. Continue Prograf (increased dose) 1.5/1.5,  Mg BID, and Prednisone 5 mg QD  Lab Results   Component Value Date    TACROLIMUS 6.2 01/25/2022    TACROLIMUS 4.4 (L) 09/27/2021    TACROLIMUS 4.4 (L) 09/27/2021   Will closely monitor for toxicities, education provided about adherence to medicines and need to communicate any side effect to the transplant nurse or physician.      3. Allograft Function:stable at baseline for the patient. Continue follow up as per our guidelines and with the local General nephrologist. Communication will be sent today.    Scr stable and remains within baseline   Lab Results   Component Value Date    CREATININE 0.9 01/25/2022 1/25/2022  7yr 0mo   eGFR if non African American >60 mL/min/1.73 m^2 >60.0  Calculation used to obtain the estimated glomerular filtration  rate (eGFR) is the CKD-EPI equation.         4. Hypertension management:  Continue with home blood pressure monitoring, low salt and healthy life discussed with the patient.  BP Readings from Last 3 Encounters:   01/31/22 (!) 114/57   01/10/22 131/70   12/24/21 (!) 118/56   Nifedipine 30 mg QD , Coreg 6.25 mg BID       5. Metabolic Bone Disease/Secondary Hyperparathyroidism:calcium and phosphorus level discussed with the patient, patient will continue follow up with the general  nephrologist for management of metabolic bone disease  calcium and phosphorus as per our center protocol. Will monitor PTH, Vit D level, calcium.   -->MGMT deferred to general nephrology and endocrinology   Ca level acceptable   Mg ox 400 mg BID, Vit D as prescribed  Lab Results   Component Value Date    .3 (H) 01/25/2022    CALCIUM 9.5 01/25/2022    CAION 1.30 01/25/2022    PHOS 3.8 01/25/2022    PHOS 2.3 (L) 09/27/2021    PHOS 2.8 08/19/2021 1/25/2022  7yr 0mo   Magnesium 1.6 - 2.6 mg/dL 1.5 (A)       6. Electrolytes: reviewed with the patient, essentially within the normal range no need for acute changes today, will monitor as per our center guidelines.    Lab Results   Component Value Date     01/25/2022    K 4.6 01/25/2022     01/25/2022    CO2 26 01/25/2022    CO2 25 12/17/2021    CO2 24 09/27/2021    CO2 24 09/27/2021    -->MGMT deferred to general nephrology      7. Anemia: will continue monitoring as per our center guidelines. No indication for acute intervention today.  Lab Results   Component Value Date    WBC 6.94 01/25/2022    HGB 12.9 01/25/2022    HCT 40.2 01/25/2022    MCV 92 01/25/2022     01/25/2022       8.Proteinuria: will continue with pr/cr ratio as per our center guidelines  Lab Results   Component Value Date    PROTEINURINE <7 01/25/2022    CREATRANDUR 77.5 01/25/2022    UTPCR Unable to calculate 01/25/2022    UTPCR Unable to calculate 12/17/2021    UTPCR Unable to calculate 08/19/2021        9. BK virus infection screening: will continue with urine or blood PCR as per our guidelines to prevent BK virus viremia and allograft dysfunction  Lab Results   Component Value Date    BKVIRUSDNAUR Not detected 10/05/2015    BKQUANTURINE <250 10/05/2015    BKVIRUSLOG <2.40 10/05/2015    BKVIRUSURINE Urine 02/16/2015    BKVIRUSPCRQB <125 08/19/2021         10. Weight education: provided during the clinic visit.   Body mass index is 27.43 kg/m².       11.Patient safety  education regarding immunosuppression including prophylaxis posttransplant for CMV, PCP : Education provided about vaccination and prevention of infections.    12.  Cytopenias: no significant cytopenias will monitor as per our guidelines. Medicine list reviewed including potential causes of drug-induced cytopenias -->MGMT deferred to general nephrology  Lab Results   Component Value Date    WBC 6.94 01/25/2022    HGB 12.9 01/25/2022    HCT 40.2 01/25/2022    MCV 92 01/25/2022     01/25/2022         Follow-up:   Annual follow-up with kidney transplant clinic with repeat labs, including renal function panel with UA, urine protein/creatinine ratio, and drug trough level q3 months.  Patient also reminded to follow-up with general nephrologist.    Education:   Material provided to the patient.  Patient reminded to call with any health changes since these can be early signs of significant complications.  Also, I advised the patient to be sure any new medications or changes of old medications are discussed with either a pharmacist or physician knowledgeable with transplant to avoid rejection/drug toxicity related to significant drug interactions.    Exercise: reminded Claudette of the importance of regular exercise for weight management, blood sugar and blood pressure management.  I also explained exercise has been shown to improve cardiovascular health, energy level, and sleep hygiene.  Lastly, I advised her that cardiovascular complications are leading cause of death for renal transplant recipients, and regular exercise can help lower this risk.    I spoke with the patient for 30 minutes. More than half dedicated to counseling and education. All questions answered    Vanesa Antoine NP-C  Transplant Nephrology    [facility] Patient Status  Functional Status: 80% - Normal activity with effort: some symptoms of disease  Physical Capacity: No Limitations

## 2022-01-31 NOTE — LETTER
January 31, 2022        Kathy Hein  [address]  [facility] [address]  Phone: [phone]  Fax: [phone]             [facility] 1st Fl  [address]  [facility] [address]  Phone: [phone]   Patient: Claudette Louise Voss   MR Number: [id number]   YOB: 1955   Date of Visit: 1/31/2022       Dear Dr. Kathy Hein    Thank you for referring Claudette Voss to me for evaluation. Attached you will find relevant portions of my assessment and plan of care.    If you have questions, please do not hesitate to call me. I look forward to following Claudette Voss along with you.    Sincerely,    Vanesa Antoine, NP    Enclosure    If you would like to receive this communication electronically, please contact [email or web address] or [phone] to request [facility] Link access.    [facility] is a tool which provides read-only access to select patient information with whom you have a relationship. Its easy to use and provides real time access to review your patients record including encounter summaries, notes, results, and demographic information.    If you feel you have received this communication in error or would no longer like to receive these types of communications, please e-mail [email or web address]

## 2022-02-25 ENCOUNTER — TELEPHONE (OUTPATIENT)
Dept: OPHTHALMOLOGY | Facility: CLINIC | Age: 67
End: 2022-02-25
Payer: MEDICARE

## 2022-02-25 NOTE — TELEPHONE ENCOUNTER
----- Message from Kiersten Smyth sent at 2/25/2022  3:22 PM CST -----  Regarding: Need urgent  Contact: Pt  Please call @ [phone], pt need an  appt  the week of March 7,pt is  losing  vision in her eyes and need to be seen

## 2022-03-31 ENCOUNTER — OFFICE VISIT (OUTPATIENT)
Dept: OPTOMETRY | Facility: CLINIC | Age: 67
End: 2022-03-31
Payer: MEDICARE

## 2022-03-31 DIAGNOSIS — Z86.69 HISTORY OF RETINAL DETACHMENT: ICD-10-CM

## 2022-03-31 DIAGNOSIS — H40.1133 PRIMARY OPEN ANGLE GLAUCOMA (POAG) OF BOTH EYES, SEVERE STAGE: ICD-10-CM

## 2022-03-31 DIAGNOSIS — Z13.5 GLAUCOMA SCREENING: ICD-10-CM

## 2022-03-31 DIAGNOSIS — H16.8 EXPOSURE KERATITIS: Primary | ICD-10-CM

## 2022-03-31 DIAGNOSIS — H35.3130 BILATERAL NONEXUDATIVE AGE-RELATED MACULAR DEGENERATION, UNSPECIFIED STAGE: ICD-10-CM

## 2022-03-31 PROCEDURE — 99999 PR PBB SHADOW E&M-EST. PATIENT-LVL III: ICD-10-PCS | Mod: PBBFAC,,, | Performed by: OPTOMETRIST

## 2022-03-31 PROCEDURE — 92134 CPTRZ OPH DX IMG PST SGM RTA: CPT | Mod: PBBFAC | Performed by: OPTOMETRIST

## 2022-03-31 PROCEDURE — 92004 PR EYE EXAM, NEW PATIENT,COMPREHESV: ICD-10-PCS | Mod: S$PBB,,, | Performed by: OPTOMETRIST

## 2022-03-31 PROCEDURE — 92015 PR REFRACTION: ICD-10-PCS | Mod: ,,, | Performed by: OPTOMETRIST

## 2022-03-31 PROCEDURE — 92250 FUNDUS PHOTOGRAPHY W/I&R: CPT | Mod: PBBFAC | Performed by: OPTOMETRIST

## 2022-03-31 PROCEDURE — 99999 PR PBB SHADOW E&M-EST. PATIENT-LVL III: CPT | Mod: PBBFAC,,, | Performed by: OPTOMETRIST

## 2022-03-31 PROCEDURE — 99213 OFFICE O/P EST LOW 20 MIN: CPT | Mod: PBBFAC | Performed by: OPTOMETRIST

## 2022-03-31 PROCEDURE — 92134 OCT, RETINA - OU - BOTH EYES: ICD-10-PCS | Mod: 26,S$PBB,, | Performed by: OPTOMETRIST

## 2022-03-31 PROCEDURE — 92004 COMPRE OPH EXAM NEW PT 1/>: CPT | Mod: S$PBB,,, | Performed by: OPTOMETRIST

## 2022-03-31 PROCEDURE — 92015 DETERMINE REFRACTIVE STATE: CPT | Mod: ,,, | Performed by: OPTOMETRIST

## 2022-03-31 NOTE — PROGRESS NOTES
HPI     65 Y/o female is here for routine eye exam with C/o pt is her for a   second opinion pt states she is losing her vision previous provider stated   that theres nothing the can be down to correct vision due the excessive   surgeries on her eyes that she has had. Pt has had two cataract surgeries   laser surgeries also retinal detachment and injection for optic nerve pt   says she also has glaucoma in left eye   Pt denies pain and discomfort   Pt see's floaters occasionally     Eye med: KET. OD QD   Alphagan OS QD   Restasis OU BID     Last edited by Gera Baird, OD on 3/31/2022  1:43 PM. (History)        ROS     Positive for: Eyes (RK OU/Blepharoplasty OU/RD repair OU/cat surgery   OU/glaucoma w SLT OS/chronic CME OD)    Negative for: Constitutional, Gastrointestinal, Neurological, Skin,   Genitourinary, Musculoskeletal, HENT, Endocrine, Cardiovascular,   Respiratory, Psychiatric, Allergic/Imm, Heme/Lymph    Last edited by Gera Baird, OD on 3/31/2022  2:44 PM. (History)        Assessment /Plan     For exam results, see Encounter Report.    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  Both Dr David and Dr Marks said there was "nothing to be done", so pt is here for second opinion.    TODAY:    1. OPTOS shows buckle OD/laser scars periph OS, but no RD  2. Mac OCT shows drusen/RPE changes, but no osmar CME or mac hole OU  3. Given optic nerve appearance I think iop TOO HIGH OS, and pt reports has been losing peripheral vision OS "for awhile"  4.  OD has large suspicious CDs, and despite low IOP may have glaucoma (vs vasc issues) in THAT eye too  5. OD also has pciol decentered inf nasally.  Dr David thought patient might benefit from IOL recentering, but Dr Marks said no  6. ALSO OD has dense SPK--suspect from exposure K (although after 16 RK cuts she may have other K problems causing issue).  7.  Cannot refract to improve VA.  Pt reports only had near spex which helped her to see up close, until Feb 18 when vision went bad     PLAN:    1. Will get glaucoma consult to determine need for treatment OD, and for better iop control OS--set up appt w Dr Frias  2. For exposure K OD will have pt cont RESTASIS BID, but add SOOTHE XP ATs q2h while awake, and restart sleep mask at night.  Set pt up to see Dr Mcdaniel for corneal consult to a) check corneas, and b) evaluate if re-positioning decentered IOL OD will help vision               "

## 2022-04-14 ENCOUNTER — TELEPHONE (OUTPATIENT)
Dept: OPHTHALMOLOGY | Facility: CLINIC | Age: 67
End: 2022-04-14
Payer: MEDICARE

## 2022-04-20 ENCOUNTER — OFFICE VISIT (OUTPATIENT)
Dept: ENDOCRINOLOGY | Facility: CLINIC | Age: 67
End: 2022-04-20
Payer: MEDICARE

## 2022-04-20 DIAGNOSIS — R79.89 ELEVATED PARATHYROID HORMONE: ICD-10-CM

## 2022-04-20 DIAGNOSIS — E21.3 HYPERPARATHYROIDISM, UNSPECIFIED: ICD-10-CM

## 2022-04-20 DIAGNOSIS — M81.8 OTHER OSTEOPOROSIS WITHOUT CURRENT PATHOLOGICAL FRACTURE: ICD-10-CM

## 2022-04-20 PROCEDURE — 99214 PR OFFICE/OUTPT VISIT, EST, LEVL IV, 30-39 MIN: ICD-10-PCS | Mod: 95,,, | Performed by: INTERNAL MEDICINE

## 2022-04-20 PROCEDURE — 99214 OFFICE O/P EST MOD 30 MIN: CPT | Mod: 95,,, | Performed by: INTERNAL MEDICINE

## 2022-04-20 NOTE — PROGRESS NOTES
ENDOCRINOLOGY CLINIC follow up  04/20/2022     The patient location is: home    Visit type: audiovisual    Face to Face time with patient: 20  30 minutes of total time spent on the encounter, which includes face to face time and non-face to face time preparing to see the patient (eg, review of tests), Obtaining and/or reviewing separately obtained history, Documenting clinical information in the electronic or other health record, Independently interpreting results (not separately reported) and communicating results to the patient/family/caregiver, or Care coordination (not separately reported).     Each patient to whom he or she provides medical services by telemedicine is:  (1) informed of the relationship between the physician and patient and the respective role of any other health care provider with respect to management of the patient; and (2) notified that he or she may decline to receive medical services by telemedicine and may withdraw from such care at any time.    The patient's last visit with me was on 12/9/2021.      Subjective:      CC: f/u hypercalcemia 2/2 tertiary hyperparathyroidism s/p parathyroidectomy 12/2022    HPI:   Claudette Voss is a 66 y.o. female with history of hypothyroidism, polycystic kidney disease status post nephrectomy and kidney transplant in 1/2015, breast cancer, and tertiary hyperparathyroidism.    Interval Hx:  s/p parathyroidectomy w/ Frank 12/23/21 (3 glands removed and L superior in tact).  No complications after surgery.      He denies any new medical problems or concerns since his last visit.       With regards to the hypercalcemia:  Prior to kidney transplant her PTH was as high as 473 with normal serum calcium consistent with secondary hyperparathyroidism.  Following kidney transplantation she has had intermittent hypercalcemia with most recent corrected calcium 11.2 consistent with tertiary hyperparathyroidism.  Neck ultrasound with probable right and left inferior  parathyroid adenoma (high likelihood of 4 gland hyperplasia given history)    In the past was on sensipar prior to kidney txp.  It was very expensive in the past and she had trouble getting it    Interval hx:  At her last visit we discussed that workup was consistent with tertiary hyperparathyroidism and given that she has a transplanted kidney we recommended parathyroidectomy.  She met with Dr. Frank and is scheduled for surgery on 12/21/2021.    She denies any new medical problems or complaints today.    Lab Results   Component Value Date    CALCIUM 9.5 01/25/2022    ALBUMIN 3.6 01/25/2022    ALBUMIN 3.6 01/25/2022    ESTGFRAFRICA >60.0 01/25/2022    EGFRNONAA >60.0 01/25/2022    PHOS 3.8 01/25/2022    ALKPHOS 67 01/25/2022    [id number] 49 04/19/2021    .3 (H) 01/25/2022     She denied current or past lithium use    Denies HCTZ use since at least 2010      Daily intake of calcium is: no supplements -  2 serving a day of dairy.  Doing optiva diet (down about 50 lbs)   Taking vitamin D: on 1000 IU twice a day     Denies constipation, depression  resolved polyuria/polydipsia,   No hx of nephrolithiasis       Last bmd was: 12/2021 - osteoporosis based on low distal radius Tscore of -3.1  Denies fractures  +height loss - 1/2-1 in  +PCKD s/p txp   No hx of kidney stones      Regarding hypothyroidism:  Diagnosed with hypothyroidism around age 38.  No fhx of thyroid problems  She is currently taking levothyroxine 88 mcg daily  She is clinically euthyroid today  Down 55lb intentionally in past year with dietary changes.  Recently wt stable over past 6 mo.      Lab Results   Component Value Date    TSH 3.055 09/27/2021    TSH 3.055 09/27/2021    FREET4 1.21 08/06/2011       Current Outpatient Medications:     artificial tears,hypromellose, (SYSTANE GEL) 0.3 % Gel, Apply 2 drops to eye 3 (three) times daily as needed., Disp: , Rfl: 0    atorvastatin (LIPITOR) 20 MG tablet, Take 20 mg by mouth once daily., Disp: ,  Rfl:     brimonidine 0.15 % OPTH DROP (ALPHAGAN) 0.15 % ophthalmic solution, Place 1 drop into the left eye 2 (two) times daily., Disp: , Rfl:     carvedilol (COREG) 6.25 MG tablet, Take 1 tablet (6.25 mg total) by mouth 2 (two) times daily., Disp: 180 tablet, Rfl: 3    fluoxetine (PROZAC) 20 MG capsule, Take 1 capsule (20 mg total) by mouth every evening., Disp: 30 capsule, Rfl: 5    fluticasone propionate (FLONASE) 50 mcg/actuation nasal spray, 2 sprays (100 mcg total) by Each Nostril route once daily., Disp: 16 g, Rfl: 5    ketorolac 0.5% (ACULAR) 0.5 % Drop, Place 1 drop into the right eye once daily., Disp: , Rfl:     levothyroxine (SYNTHROID) 88 MCG tablet, Take 88 mcg by mouth once daily., Disp: , Rfl:     magnesium oxide (MAG-OX) 400 mg (241.3 mg magnesium) tablet, Take 400 mg by mouth 2 (two) times daily., Disp: , Rfl:     mycophenolate (MYFORTIC) 180 MG TbEC, Take 1 tablet (180 mg total) by mouth 2 (two) times daily., Disp: 60 tablet, Rfl: 11    nifedipine 30 MG ORAL TR24 (PROCARDIA-XL) 30 MG (OSM) 24 hr tablet, Take 1 tablet (30 mg total) by mouth once daily., Disp: 90 tablet, Rfl: 4    tacrolimus (PROGRAF) 0.5 MG Cap, Take 3 capsules (1.5 mg total) by mouth every morning AND 3 capsules (1.5 mg total) every evening., Disp: 180 capsule, Rfl: 11    vitamin D (VITAMIN D3) 1000 units Tab, Take 1,000 Units by mouth 2 (two) times daily., Disp: , Rfl:   HeROS:  See hpi    Objective:   Physical Exam   LMP  (LMP Unknown)   Wt Readings from Last 3 Encounters:   01/31/22 61.6 kg (135 lb 12.9 oz)   12/27/21 59 kg (130 lb)   12/23/21 59 kg (130 lb)   ]    Constitutional:  Pleasant,  in no acute distress.   HENT:   Eyes:     No scleral icterus.   Respiratory:   Effort normal   Neurological:  normal speech  Psych:  Normal mood and affect.      LABORATORY REVIEW:    Chemistry        Component Value Date/Time     01/25/2022 0900    K 4.6 01/25/2022 0900     01/25/2022 0900    CO2 26 01/25/2022 0900     BUN 35 (H) 01/25/2022 0900    CREATININE 0.9 01/25/2022 0900     (H) 01/25/2022 0900        Component Value Date/Time    CALCIUM 9.5 01/25/2022 0900    ALKPHOS 67 01/25/2022 0900    AST 19 01/25/2022 0900    ALT 17 01/25/2022 0900    BILITOT 0.4 01/25/2022 0900    ESTGFRAFRICA >60.0 01/25/2022 0900    EGFRNONAA >60.0 01/25/2022 0900          Lab Results   Component Value Date    HGBA1C 5.8 10/23/2017    HGBA1C 5.9 03/23/2015    HGBA1C 5.5 08/06/2011     Lab Results   Component Value Date    [id number] 49 04/19/2021        Assessment/Plan:     Problem List Items Addressed This Visit        Unprioritized    Elevated parathyroid hormone     Status post parathyroid surgery with 1 remaining parathyroid gland (12/20/2022) with normalization of serum calcium but mild persistent elevation of parathyroid hormone.  Will recheck PTH, serum calcium as well as vitamin-D level and continue current vitamin-D replacement.    If serum calcium remains normal she can continue to follow-up with her primary care provider.  Will have her return back to endocrine clinic if she experiences recurrent hypercalcemia.           Relevant Orders    Vitamin D    Renal Function Panel    PTH, Intact    DXA Bone Density Spine And Hip    Other osteoporosis without current pathological fracture     Most recent bone density scan from December 2021 showing T-score of -3.1 in distal radius likely due to hyperparathyroidism, now status post resection.  Will hold off on starting medical management for osteoporosis and recheck bone density 1 year after parathyroid surgery (December 2022) and if there is significant improvement no indication for medication.           Relevant Orders    DXA Bone Density Spine And Hip      Other Visit Diagnoses     Hyperparathyroidism, unspecified         Relevant Orders    Vitamin D        Return to clinic in 1 year  Lab now  Schedule dexa at [facility] in 12/2022    Jensen Infante MD

## 2022-04-20 NOTE — ASSESSMENT & PLAN NOTE
Most recent bone density scan from December 2021 showing T-score of -3.1 in distal radius likely due to hyperparathyroidism, now status post resection.  Will hold off on starting medical management for osteoporosis and recheck bone density 1 year after parathyroid surgery (December 2022) and if there is significant improvement no indication for medication.

## 2022-04-20 NOTE — Clinical Note
Return to clinic in 1 year Lab now Schedule dexa at [facility] in 12/2022

## 2022-04-21 ENCOUNTER — TELEPHONE (OUTPATIENT)
Dept: OPHTHALMOLOGY | Facility: CLINIC | Age: 67
End: 2022-04-21
Payer: MEDICARE

## 2022-04-27 ENCOUNTER — OFFICE VISIT (OUTPATIENT)
Dept: OPHTHALMOLOGY | Facility: CLINIC | Age: 67
End: 2022-04-27
Payer: MEDICARE

## 2022-04-27 DIAGNOSIS — M35.01 KERATITIS SICCA: ICD-10-CM

## 2022-04-27 DIAGNOSIS — H18.9 EXPOSURE KERATOPATHY: ICD-10-CM

## 2022-04-27 DIAGNOSIS — Z98.890 HISTORY OF RADIAL KERATOTOMY: Primary | ICD-10-CM

## 2022-04-27 DIAGNOSIS — H27.122: ICD-10-CM

## 2022-04-27 PROCEDURE — 99999 PR PBB SHADOW E&M-EST. PATIENT-LVL III: ICD-10-PCS | Mod: PBBFAC,,, | Performed by: OPHTHALMOLOGY

## 2022-04-27 PROCEDURE — 92004 PR EYE EXAM, NEW PATIENT,COMPREHESV: ICD-10-PCS | Mod: S$PBB,,, | Performed by: OPHTHALMOLOGY

## 2022-04-27 PROCEDURE — 99999 PR PBB SHADOW E&M-EST. PATIENT-LVL III: CPT | Mod: PBBFAC,,, | Performed by: OPHTHALMOLOGY

## 2022-04-27 PROCEDURE — 92004 COMPRE OPH EXAM NEW PT 1/>: CPT | Mod: S$PBB,,, | Performed by: OPHTHALMOLOGY

## 2022-04-27 PROCEDURE — 99213 OFFICE O/P EST LOW 20 MIN: CPT | Mod: PBBFAC | Performed by: OPHTHALMOLOGY

## 2022-04-27 NOTE — PROGRESS NOTES
HPI     67 y/o female is here for a second opinion.  She was told she has   Keratitis OU.  She was also told there was nothing more anyone can do for   her vision.    KET QD OD  Alphagan QD OS  Restasis BID OU    Last edited by Diane Palomo on 4/27/2022  1:51 PM. (History)            Assessment /Plan     For exam results, see Encounter Report.    History of radial keratotomy    Anterior dislocation of IOL (intraocular lens), left    Keratitis sicca    Exposure keratopathy    OD:  [place] at night with pain in AM, now improved with treatment per Dr Baird at night with lubrication.  Continue with those treatments.  RK with irregular astig and subluxed IOL contributing to vision loss, but stable for years.    OS: RK clear poor vision due to RD

## 2022-05-06 ENCOUNTER — OFFICE VISIT (OUTPATIENT)
Dept: OPHTHALMOLOGY | Facility: CLINIC | Age: 67
End: 2022-05-06
Payer: MEDICARE

## 2022-05-06 DIAGNOSIS — H27.122: ICD-10-CM

## 2022-05-06 DIAGNOSIS — Z98.890 HISTORY OF RADIAL KERATOTOMY: ICD-10-CM

## 2022-05-06 DIAGNOSIS — H44.23 DEGENERATIVE MYOPIA OF BOTH EYES, UNSPECIFIED WHETHER COMPLICATION PRESENT: ICD-10-CM

## 2022-05-06 DIAGNOSIS — H18.9 EXPOSURE KERATOPATHY: ICD-10-CM

## 2022-05-06 DIAGNOSIS — Z98.890 HISTORY OF BLEPHAROPLASTY: ICD-10-CM

## 2022-05-06 DIAGNOSIS — Q07.8 ANOMALOUS OPTIC NERVE: ICD-10-CM

## 2022-05-06 DIAGNOSIS — M35.01 KERATITIS SICCA: ICD-10-CM

## 2022-05-06 DIAGNOSIS — H35.721 RETINAL PIGMENT EPITHELIAL DETACHMENT, RIGHT: ICD-10-CM

## 2022-05-06 DIAGNOSIS — H40.1113 PRIMARY OPEN ANGLE GLAUCOMA (POAG) OF RIGHT EYE, SEVERE STAGE: Primary | ICD-10-CM

## 2022-05-06 DIAGNOSIS — Z96.1 PSEUDOPHAKIA OF BOTH EYES: ICD-10-CM

## 2022-05-06 DIAGNOSIS — H40.1123 PRIMARY OPEN ANGLE GLAUCOMA (POAG) OF LEFT EYE, SEVERE STAGE: ICD-10-CM

## 2022-05-06 PROCEDURE — 99214 OFFICE O/P EST MOD 30 MIN: CPT | Mod: S$PBB,,, | Performed by: STUDENT IN AN ORGANIZED HEALTH CARE EDUCATION/TRAINING PROGRAM

## 2022-05-06 PROCEDURE — 99214 PR OFFICE/OUTPT VISIT, EST, LEVL IV, 30-39 MIN: ICD-10-PCS | Mod: S$PBB,,, | Performed by: STUDENT IN AN ORGANIZED HEALTH CARE EDUCATION/TRAINING PROGRAM

## 2022-05-06 PROCEDURE — 99999 PR PBB SHADOW E&M-EST. PATIENT-LVL III: CPT | Mod: PBBFAC,,, | Performed by: STUDENT IN AN ORGANIZED HEALTH CARE EDUCATION/TRAINING PROGRAM

## 2022-05-06 PROCEDURE — 99213 OFFICE O/P EST LOW 20 MIN: CPT | Mod: PBBFAC | Performed by: STUDENT IN AN ORGANIZED HEALTH CARE EDUCATION/TRAINING PROGRAM

## 2022-05-06 PROCEDURE — 92020 PR SPECIAL EYE EVAL,GONIOSCOPY: ICD-10-PCS | Mod: S$PBB,,, | Performed by: STUDENT IN AN ORGANIZED HEALTH CARE EDUCATION/TRAINING PROGRAM

## 2022-05-06 PROCEDURE — 92020 GONIOSCOPY: CPT | Mod: S$PBB,,, | Performed by: STUDENT IN AN ORGANIZED HEALTH CARE EDUCATION/TRAINING PROGRAM

## 2022-05-06 PROCEDURE — 92020 GONIOSCOPY: CPT | Mod: PBBFAC | Performed by: STUDENT IN AN ORGANIZED HEALTH CARE EDUCATION/TRAINING PROGRAM

## 2022-05-06 PROCEDURE — 99999 PR PBB SHADOW E&M-EST. PATIENT-LVL III: ICD-10-PCS | Mod: PBBFAC,,, | Performed by: STUDENT IN AN ORGANIZED HEALTH CARE EDUCATION/TRAINING PROGRAM

## 2022-05-06 RX ORDER — LATANOPROST 50 UG/ML
1 SOLUTION/ DROPS OPHTHALMIC NIGHTLY
Qty: 2.5 ML | Refills: 1 | Status: SHIPPED | OUTPATIENT
Start: 2022-05-06 | End: 2022-06-07 | Stop reason: SDUPTHER

## 2022-05-06 RX ORDER — CYCLOSPORINE 0.5 MG/ML
EMULSION OPHTHALMIC
COMMUNITY
Start: 2022-04-18 | End: 2023-04-19 | Stop reason: ALTCHOICE

## 2022-05-06 NOTE — PROGRESS NOTES
"Subjective:       Patient ID: Claudette Louise Voss is a 66 y.o. female.    Chief Complaint: Glaucoma (Glaucoma Eval- 2nd opinon)    HPI     Glaucoma     Comments: Glaucoma Eval- 2nd opinon              Comments     Pt reports complex eye history. Previously before RK was high myope wore   CTL. Had RK 20 years ago. Then CE IOL. Complicated by RD OU. Hx   blepharoplasty OU. Has been treating with Dr. David at  for retina, he   completed RD repair and she has a buckle OD. Still taking ketorolac once a   day in the right eye for CME. Not on injections. Taking Brimonidine BID OS   for glaucoma. Has felt like OS has been progressively worsening VF   constriction despite IOP drops. Has had SLTx2 in OS.             Last edited by Lilo Frias MD on 5/6/2022  6:31 PM. (History)               Assessment & Plan   Primary open angle glaucoma (POAG) of right eye, severe stage  -     Posterior Segment OCT Optic Nerve- Both eyes; Future    Primary open angle glaucoma (POAG) of left eye, severe stage  -     latanoprost (XALATAN) 0.005 % ophthalmic solution; Place 1 drop into the left eye every evening.  Dispense: 2.5 mL; Refill: 1    Pseudophakia of both eyes    History of radial keratotomy    Anterior dislocation of IOL (intraocular lens), left    Keratitis sicca    Exposure keratopathy    Degenerative myopia of both eyes, unspecified whether complication present    History of blepharoplasty    Retinal pigment epithelial detachment, right    Anomalous optic nerve      From Dr. Baird's Note  " Pt has EXTENSIVE eye Hx:    -1980s: 16 cut RK OD/8 cut RK OS sp years of HCL wear  -2006: cat surgery OU  -2006: blepharoplasty OU w resultant GAEL/exposure K--hx plugs/sleep mask, tho pt does not wear now.  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  Both Dr David and Dr Marks said there was "nothing to be done", so pt is here for second opinion."      POAG severe OU  Anomalous optic nerve OU  -Fhx (), Steroids (),Trauma()  -Drops: Brimonidine BID OS  -Drop intolerance/contraindication:  -Laser: SLT x2 OS  -Surgeries: RD repair OU // CE IOL OU // RK OU  -CCT: need  -Gonio: SS OU   IOP OS likely too high for the health of the eye  Increase Brimonidine to TID OS  Start Latanoprost qHS OS  Recommend against additional SLT OS    GAEL OU  Improving but still dry   Using full-preserved Soothe drops - discussed preservatives  Change to PFAT - recommend Refresh Plus - handout with picture provided  OK for q1-2h OU  Cont Restasis BID OU    De-centered IOL OD  Has seen Dr. Mcdaniel -  monitor      Pathologic Myopia  Hx RD OU  Scleral buckle OD  Non-center involving CME OD on Ketorolac qD  PED OD  Refer to Retina - pt interested in consolidating care at [facility]  Obtain outside records from Dr. David at [facility]    PLAN  Start Latanoprost qHS OS  Increase Brimonidine to TID OS    RTC 4 weeks for IOP check with OCT-RNFL OU and pachymetry.     Lilo Frias M.D., M.S.  Department of Ophthalmology   Division of Glaucoma Surgery  [facility]  "

## 2022-05-11 ENCOUNTER — PATIENT MESSAGE (OUTPATIENT)
Dept: RESEARCH | Facility: CLINIC | Age: 67
End: 2022-05-11
Payer: MEDICARE

## 2022-05-25 ENCOUNTER — OFFICE VISIT (OUTPATIENT)
Dept: OPHTHALMOLOGY | Facility: CLINIC | Age: 67
End: 2022-05-25
Payer: MEDICARE

## 2022-05-25 DIAGNOSIS — H40.1113 PRIMARY OPEN ANGLE GLAUCOMA (POAG) OF RIGHT EYE, SEVERE STAGE: Primary | ICD-10-CM

## 2022-05-25 DIAGNOSIS — Z98.890 HISTORY OF BLEPHAROPLASTY: ICD-10-CM

## 2022-05-25 DIAGNOSIS — H44.23 DEGENERATIVE MYOPIA OF BOTH EYES, UNSPECIFIED WHETHER COMPLICATION PRESENT: ICD-10-CM

## 2022-05-25 DIAGNOSIS — H40.1123 PRIMARY OPEN ANGLE GLAUCOMA (POAG) OF LEFT EYE, SEVERE STAGE: ICD-10-CM

## 2022-05-25 DIAGNOSIS — Z98.890 HISTORY OF RADIAL KERATOTOMY: ICD-10-CM

## 2022-05-25 DIAGNOSIS — Z96.1 PSEUDOPHAKIA OF BOTH EYES: ICD-10-CM

## 2022-05-25 DIAGNOSIS — H27.122: ICD-10-CM

## 2022-05-25 DIAGNOSIS — H18.9 EXPOSURE KERATOPATHY: ICD-10-CM

## 2022-05-25 DIAGNOSIS — H35.721 RETINAL PIGMENT EPITHELIAL DETACHMENT, RIGHT: ICD-10-CM

## 2022-05-25 DIAGNOSIS — M35.01 KERATITIS SICCA: ICD-10-CM

## 2022-05-25 DIAGNOSIS — Q07.8 ANOMALOUS OPTIC NERVE: ICD-10-CM

## 2022-05-25 PROCEDURE — 99999 PR PBB SHADOW E&M-EST. PATIENT-LVL III: CPT | Mod: PBBFAC,,, | Performed by: STUDENT IN AN ORGANIZED HEALTH CARE EDUCATION/TRAINING PROGRAM

## 2022-05-25 PROCEDURE — 76514 ECHO EXAM OF EYE THICKNESS: CPT | Mod: PBBFAC | Performed by: STUDENT IN AN ORGANIZED HEALTH CARE EDUCATION/TRAINING PROGRAM

## 2022-05-25 PROCEDURE — 99999 PR PBB SHADOW E&M-EST. PATIENT-LVL III: ICD-10-PCS | Mod: PBBFAC,,, | Performed by: STUDENT IN AN ORGANIZED HEALTH CARE EDUCATION/TRAINING PROGRAM

## 2022-05-25 PROCEDURE — 99214 PR OFFICE/OUTPT VISIT, EST, LEVL IV, 30-39 MIN: ICD-10-PCS | Mod: S$PBB,,, | Performed by: STUDENT IN AN ORGANIZED HEALTH CARE EDUCATION/TRAINING PROGRAM

## 2022-05-25 PROCEDURE — 99214 OFFICE O/P EST MOD 30 MIN: CPT | Mod: S$PBB,,, | Performed by: STUDENT IN AN ORGANIZED HEALTH CARE EDUCATION/TRAINING PROGRAM

## 2022-05-25 PROCEDURE — 76514 PR  US, EYE, FOR CORNEAL THICKNESS: ICD-10-PCS | Mod: 26,S$PBB,, | Performed by: STUDENT IN AN ORGANIZED HEALTH CARE EDUCATION/TRAINING PROGRAM

## 2022-05-25 PROCEDURE — 92133 CPTRZD OPH DX IMG PST SGM ON: CPT | Mod: PBBFAC | Performed by: STUDENT IN AN ORGANIZED HEALTH CARE EDUCATION/TRAINING PROGRAM

## 2022-05-25 PROCEDURE — 92133 POSTERIOR SEGMENT OCT OPTIC NERVE(OCULAR COHERENCE TOMOGRAPHY) - OU - BOTH EYES: ICD-10-PCS | Mod: 26,S$PBB,, | Performed by: STUDENT IN AN ORGANIZED HEALTH CARE EDUCATION/TRAINING PROGRAM

## 2022-05-25 PROCEDURE — 99213 OFFICE O/P EST LOW 20 MIN: CPT | Mod: PBBFAC | Performed by: STUDENT IN AN ORGANIZED HEALTH CARE EDUCATION/TRAINING PROGRAM

## 2022-05-25 PROCEDURE — 76514 ECHO EXAM OF EYE THICKNESS: CPT | Mod: 26,S$PBB,, | Performed by: STUDENT IN AN ORGANIZED HEALTH CARE EDUCATION/TRAINING PROGRAM

## 2022-05-26 ENCOUNTER — OFFICE VISIT (OUTPATIENT)
Dept: OPHTHALMOLOGY | Facility: CLINIC | Age: 67
End: 2022-05-26
Payer: MEDICARE

## 2022-05-26 DIAGNOSIS — H44.2C3 BOTH EYES AFFECTED BY DEGENERATIVE MYOPIA WITH RETINAL DETACHMENT: Primary | ICD-10-CM

## 2022-05-26 DIAGNOSIS — H35.721 RETINAL PIGMENT EPITHELIAL DETACHMENT, RIGHT: ICD-10-CM

## 2022-05-26 DIAGNOSIS — H35.351 CYSTOID MACULAR EDEMA, RIGHT: ICD-10-CM

## 2022-05-26 DIAGNOSIS — Z96.1 PSEUDOPHAKIA OF BOTH EYES: ICD-10-CM

## 2022-05-26 DIAGNOSIS — H40.1133 PRIMARY OPEN ANGLE GLAUCOMA (POAG) OF BOTH EYES, SEVERE STAGE: ICD-10-CM

## 2022-05-26 PROCEDURE — 99999 PR PBB SHADOW E&M-EST. PATIENT-LVL III: ICD-10-PCS | Mod: PBBFAC,,, | Performed by: OPHTHALMOLOGY

## 2022-05-26 PROCEDURE — 99214 OFFICE O/P EST MOD 30 MIN: CPT | Mod: S$PBB,,, | Performed by: OPHTHALMOLOGY

## 2022-05-26 PROCEDURE — 99214 PR OFFICE/OUTPT VISIT, EST, LEVL IV, 30-39 MIN: ICD-10-PCS | Mod: S$PBB,,, | Performed by: OPHTHALMOLOGY

## 2022-05-26 PROCEDURE — 99213 OFFICE O/P EST LOW 20 MIN: CPT | Mod: PBBFAC | Performed by: OPHTHALMOLOGY

## 2022-05-26 PROCEDURE — 99999 PR PBB SHADOW E&M-EST. PATIENT-LVL III: CPT | Mod: PBBFAC,,, | Performed by: OPHTHALMOLOGY

## 2022-05-26 PROCEDURE — 92134 CPTRZ OPH DX IMG PST SGM RTA: CPT | Mod: PBBFAC | Performed by: OPHTHALMOLOGY

## 2022-05-26 PROCEDURE — 92134 OCT, RETINA - OU - BOTH EYES: ICD-10-PCS | Mod: 26,S$PBB,, | Performed by: OPHTHALMOLOGY

## 2022-05-26 NOTE — PROGRESS NOTES
HPI     Retinal Eval  [facility]-[id number]         EYEMEDS: Refresh every h    67 y.o. is here for Retinal Eval. Pt states shes taking Ketorlac but want   to try an Rx that does the same thing but does not dry her eyes.   Occasionally she get floaters OU. Pt states her eyes are always red   because she is on the computer a lot. Pt is sensitive to light. She has no   dep perception. Referred by Dr. Frias    (-) Eye pain  (-) Flashes  (+) Floaters   (+) Double vision   (-) Photophobia  (+) Glare   (-) Headaches          Last edited by Florence Booker MA on 5/26/2022  1:39 PM. (History)          A/P    ICD-10-CM ICD-9-CM   1. Cystoid macular edema, right  H35.351 362.53   2. 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 Retinal pigment epithelial detachment, right  H35.721 362.42   4. Primary open angle glaucoma (POAG) of both eyes, severe stage  H40.1133 365.11     365.73   5. Pseudophakia of both eyes  Z96.1 V43.1       1. Both eyes affected by degenerative myopia with retinal detachment  Hx myopic degeneration with RD, s/p repair OU  Retina flat and attached today, prev was taken care of by Dr. David at     2. Cystoid macular edema, right  F/b outside doc in past, chronic CME OD (due to decentered IOL) has had inj and daily used acular BID with significant dry eye symptoms    Patient states that when she had stopped acular in past she had recurrence of IRF    Today tr IRF only, we can try decr to daily ketorolac and observe for a month, if stable or better we can do every other day to help ocular surface heal      3. Retinal pigment epithelial detachment, right  Mild PED no heme  Plan: Observation     4. Primary open angle glaucoma (POAG) of both eyes, severe stage  F/b Dr. Frias  IOP 12/18 on Brim TID, Latan qHS OS  Plan: Continue Present Management     5. Pseudophakia of both eyes  Decentered IOL OD, stable for now  OS is good position  Plan: Observation     RTC 1 month  DFE/OCTm OU    I saw and examined the patient and reviewed in detail the findings documented. The final examination findings, image interpretations, and plan as documented in the record represent my personal judgment and conclusions.    Guillermo Rendon MD  Vitreoretinal Surgery   [facility]

## 2022-06-07 DIAGNOSIS — H40.1123 PRIMARY OPEN ANGLE GLAUCOMA (POAG) OF LEFT EYE, SEVERE STAGE: ICD-10-CM

## 2022-06-07 RX ORDER — LATANOPROST 50 UG/ML
1 SOLUTION/ DROPS OPHTHALMIC NIGHTLY
Qty: 2.5 ML | Refills: 6 | Status: SHIPPED | OUTPATIENT
Start: 2022-06-07 | End: 2023-04-17

## 2022-06-08 ENCOUNTER — PATIENT MESSAGE (OUTPATIENT)
Dept: ENDOCRINOLOGY | Facility: CLINIC | Age: 67
End: 2022-06-08
Payer: MEDICARE

## 2022-06-08 NOTE — TELEPHONE ENCOUNTER
Outside labs done by pcp in media:  4/19/22  Normal Ca  Slightly high TSH with normal fT4    Recommend repeat TSH and fT4 at f/u with pcp.              Jensen Infante MD

## 2022-06-23 ENCOUNTER — OFFICE VISIT (OUTPATIENT)
Dept: OPHTHALMOLOGY | Facility: CLINIC | Age: 67
End: 2022-06-23
Payer: MEDICARE

## 2022-06-23 DIAGNOSIS — H04.123 DRY EYE SYNDROME, BILATERAL: ICD-10-CM

## 2022-06-23 DIAGNOSIS — H35.351 CYSTOID MACULAR EDEMA, RIGHT: ICD-10-CM

## 2022-06-23 DIAGNOSIS — H40.1133 PRIMARY OPEN ANGLE GLAUCOMA (POAG) OF BOTH EYES, SEVERE STAGE: ICD-10-CM

## 2022-06-23 DIAGNOSIS — Z96.1 PSEUDOPHAKIA OF BOTH EYES: ICD-10-CM

## 2022-06-23 DIAGNOSIS — H35.721 RETINAL PIGMENT EPITHELIAL DETACHMENT, RIGHT: ICD-10-CM

## 2022-06-23 DIAGNOSIS — H44.2C3 BOTH EYES AFFECTED BY DEGENERATIVE MYOPIA WITH RETINAL DETACHMENT: Primary | ICD-10-CM

## 2022-06-23 DIAGNOSIS — H18.9 EXPOSURE KERATOPATHY: ICD-10-CM

## 2022-06-23 PROCEDURE — 92134 CPTRZ OPH DX IMG PST SGM RTA: CPT | Mod: PBBFAC | Performed by: OPHTHALMOLOGY

## 2022-06-23 PROCEDURE — 99214 OFFICE O/P EST MOD 30 MIN: CPT | Mod: S$PBB,,, | Performed by: OPHTHALMOLOGY

## 2022-06-23 PROCEDURE — 99214 PR OFFICE/OUTPT VISIT, EST, LEVL IV, 30-39 MIN: ICD-10-PCS | Mod: S$PBB,,, | Performed by: OPHTHALMOLOGY

## 2022-06-23 PROCEDURE — 99999 PR PBB SHADOW E&M-EST. PATIENT-LVL III: CPT | Mod: PBBFAC,,, | Performed by: OPHTHALMOLOGY

## 2022-06-23 PROCEDURE — 99213 OFFICE O/P EST LOW 20 MIN: CPT | Mod: PBBFAC | Performed by: OPHTHALMOLOGY

## 2022-06-23 PROCEDURE — 99999 PR PBB SHADOW E&M-EST. PATIENT-LVL III: ICD-10-PCS | Mod: PBBFAC,,, | Performed by: OPHTHALMOLOGY

## 2022-06-23 PROCEDURE — 92134 OCT, RETINA - OU - BOTH EYES: ICD-10-PCS | Mod: 26,S$PBB,, | Performed by: OPHTHALMOLOGY

## 2022-06-23 NOTE — PROGRESS NOTES
HPI     1 mo DFE/OCT   DLS-05/26/2022 Dr. Rendon     Pt sts no changes in vision since last eye exam.  Denies pain   (-)Flashes (-)Floaters  (+)Photophobia x15 yrs  (-)Glare    Brimonidine TID OS   Latanoprost QHS OS  Restasis BID OU   Refresh PRN     Retina sx Dr. David 10 years ago & hx of steroid injection OD  RK sx 30yrs ago   Cataract sx  20 years ago ?    Last edited by Cierra Farmer on 6/23/2022 10:42 AM. (History)          A/P    ICD-10-CM ICD-9-CM   1. 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 Cystoid macular edema, right  H35.351 362.53   3. Retinal pigment epithelial detachment, right  H35.721 362.42   4. Primary open angle glaucoma (POAG) of both eyes, severe stage  H40.1133 365.11     365.73   5. Pseudophakia of both eyes  Z96.1 V43.1   6. Exposure keratopathy  H18.9 371.40   7. Dry eye syndrome, bilateral  H04.123 375.15       1. Both eyes affected by degenerative myopia with retinal detachment  Hx myopic degeneration with RD, s/p repair OU  Retina flat and attached today, prev was taken care of by Dr. David at     2. Cystoid macular edema, right  F/b outside doc in past, chronic CME OD (due to decentered IOL) has had inj and daily used acular BID with significant dry eye symptoms    Using Acular daily, no significant incr in IRF  Plan:   Continue Acular daily OD    3. Retinal pigment epithelial detachment, right  Mild PED no heme  Plan: Observation     4. Primary open angle glaucoma (POAG) of both eyes, severe stage  F/b Dr. Frias  IOP 12/18 on Brim TID, Latan qHS OS  Plan: Continue Present Management     5. Pseudophakia of both eyes  Decentered IOL OD, stable for now  OS is good position  Plan: Observation     6. Exposure keratopathy  7. Dry eye syndrome, bilateral  OD moreso than OS, patient has hx bleph OU, but patient states her eyelids are open at night and she has to wear goggles, wakes up with significant burning each day    Good closure  today on exam but slight lag OD  Will refer to oculoplastics for baseline evaluation    RTC Julieta - 2 month DFE/OCTm OU  RTC Watson next avail    I saw and examined the patient and reviewed in detail the findings documented. The final examination findings, image interpretations, and plan as documented in the record represent my personal judgment and conclusions.    Guillermo Rendon MD  Vitreoretinal Surgery   [facility]

## 2022-08-16 NOTE — PROGRESS NOTES
"Subjective:       Patient ID: Claudette Louise Voss is a 67 y.o. female.    Chief Complaint: Glaucoma (3 mon iop chk)       HPI     Glaucoma     Comments: 3 mon iop chk              Comments     Pt states her vision seems to be doing better in OD.    Ketorolac QD OD  Alphagan TID OS   Restasis BID OU  Latanoprost QHS OS  Refresh every hour OU          Last edited by Randy Jefferson on 8/17/2022  2:23 PM. (History)            Assessment & Plan   Primary open angle glaucoma (POAG) of both eyes, severe stage    Retinal pigment epithelial detachment, right    Exposure keratopathy    Dry eye syndrome, bilateral    Pseudophakia of both eyes    Both eyes affected by degenerative myopia with retinal detachment    Keratoconjunctivitis sicca  -     cycloSPORINE-chondroit sulf A 0.1-0.25 % Drop; Place 1 drop into both eyes 2 (two) times a day.  Dispense: 16 mL; Refill: 3          From Dr. Baird's Note  " Pt has EXTENSIVE eye Hx:    -1980s: 16 cut RK OD/8 cut RK OS sp years of HCL wear  -2006: cat surgery OU  -2006: blepharoplasty OU w resultant GAEL/exposure K--hx plugs/sleep mask, tho pt does not wear now.  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  Both Dr David and Dr Marks said there was "nothing to be done", so pt is here for second opinion."      POAG severe OU  Anomalous optic nerve OU  -Fhx (), Steroids (),Trauma()  -Drops: Latanoprost qHS OS // Brimonidine to TID OS  -Drop intolerance/contraindication:  -Laser: SLT x2 OS  -Surgeries: RD repair OU // CE IOL OU // RK " OU  -CCT: 556 // 552  -Gonio: SS OU     IOP OS improved with change in drops - continue    5/22 RNFL dec TS/TI/NI OD // dec throughout OS      GAEL OU  Exposure keratopathy  Improving but still dry - improving  PFAT - recommend Refresh Plus - handout with picture provided  OK for q1-2h OU  Cont Restasis BID OU --> $350 per month --> change to Klarity-C [employer]prim[employer] -- sent to pharmacy, will be cheaper  Oculoplastics 11/22 -- encouraged keep appt     Pseudophakia of both eyes  Decentered IOL OD, stable for now  Has seen Dr. Mcdaniel -  monitor    Degenerative myopia with retinal detachment OU  Hx myopic degeneration with RD, s/p repair OU  Retina flat and attached, prev was taken care of by Dr. David at   Now following with Dr. Rendon    Cystoid macular edema OD  F/b outside doc in past, chronic CME OD (due to decentered IOL) has had inj and daily used acular BID with significant dry eye symptoms  Now following with Dr. Rendon  Continue Acular daily OD    Retinal pigment epithelial detachment OD  Mild PED no heme      PLAN  Continue Latanoprost qHS OS and Brimonidine to TID OS  Change restasis to Klarity OS  FU with Dr. Rendon regarding Ketorolac tapering    Discussed maternity leave - can FU with NOELLE or JONELLE PRN     RTC 5 months IOP check with gonio       Lilo Frias M.D., M.S.  Department of Ophthalmology   Division of Glaucoma Surgery  [facility]

## 2022-08-17 ENCOUNTER — OFFICE VISIT (OUTPATIENT)
Dept: OPHTHALMOLOGY | Facility: CLINIC | Age: 67
End: 2022-08-17
Payer: MEDICARE

## 2022-08-17 DIAGNOSIS — H18.9 EXPOSURE KERATOPATHY: ICD-10-CM

## 2022-08-17 DIAGNOSIS — H35.721 RETINAL PIGMENT EPITHELIAL DETACHMENT, RIGHT: ICD-10-CM

## 2022-08-17 DIAGNOSIS — M35.01 KERATOCONJUNCTIVITIS SICCA: ICD-10-CM

## 2022-08-17 DIAGNOSIS — H44.2C3 BOTH EYES AFFECTED BY DEGENERATIVE MYOPIA WITH RETINAL DETACHMENT: ICD-10-CM

## 2022-08-17 DIAGNOSIS — H40.1123 PRIMARY OPEN ANGLE GLAUCOMA (POAG) OF LEFT EYE, SEVERE STAGE: ICD-10-CM

## 2022-08-17 DIAGNOSIS — Z96.1 PSEUDOPHAKIA OF BOTH EYES: ICD-10-CM

## 2022-08-17 DIAGNOSIS — H04.123 DRY EYE SYNDROME, BILATERAL: ICD-10-CM

## 2022-08-17 DIAGNOSIS — H40.1133 PRIMARY OPEN ANGLE GLAUCOMA (POAG) OF BOTH EYES, SEVERE STAGE: Primary | ICD-10-CM

## 2022-08-17 PROCEDURE — 92014 COMPRE OPH EXAM EST PT 1/>: CPT | Mod: S$PBB,,, | Performed by: STUDENT IN AN ORGANIZED HEALTH CARE EDUCATION/TRAINING PROGRAM

## 2022-08-17 PROCEDURE — 92014 PR EYE EXAM, EST PATIENT,COMPREHESV: ICD-10-PCS | Mod: S$PBB,,, | Performed by: STUDENT IN AN ORGANIZED HEALTH CARE EDUCATION/TRAINING PROGRAM

## 2022-08-17 PROCEDURE — 99213 OFFICE O/P EST LOW 20 MIN: CPT | Mod: PBBFAC | Performed by: STUDENT IN AN ORGANIZED HEALTH CARE EDUCATION/TRAINING PROGRAM

## 2022-08-17 PROCEDURE — 99999 PR PBB SHADOW E&M-EST. PATIENT-LVL III: ICD-10-PCS | Mod: PBBFAC,,, | Performed by: STUDENT IN AN ORGANIZED HEALTH CARE EDUCATION/TRAINING PROGRAM

## 2022-08-17 PROCEDURE — 99999 PR PBB SHADOW E&M-EST. PATIENT-LVL III: CPT | Mod: PBBFAC,,, | Performed by: STUDENT IN AN ORGANIZED HEALTH CARE EDUCATION/TRAINING PROGRAM

## 2022-08-23 ENCOUNTER — OFFICE VISIT (OUTPATIENT)
Dept: OPHTHALMOLOGY | Facility: CLINIC | Age: 67
End: 2022-08-23
Payer: MEDICARE

## 2022-08-23 DIAGNOSIS — H35.351 CYSTOID MACULAR EDEMA, RIGHT: Primary | ICD-10-CM

## 2022-08-23 DIAGNOSIS — H44.2C3 BOTH EYES AFFECTED BY DEGENERATIVE MYOPIA WITH RETINAL DETACHMENT: ICD-10-CM

## 2022-08-23 DIAGNOSIS — H40.1133 PRIMARY OPEN ANGLE GLAUCOMA (POAG) OF BOTH EYES, SEVERE STAGE: ICD-10-CM

## 2022-08-23 DIAGNOSIS — H35.721 RETINAL PIGMENT EPITHELIAL DETACHMENT, RIGHT: ICD-10-CM

## 2022-08-23 PROCEDURE — 99999 PR PBB SHADOW E&M-EST. PATIENT-LVL III: ICD-10-PCS | Mod: PBBFAC,,, | Performed by: OPHTHALMOLOGY

## 2022-08-23 PROCEDURE — 99214 OFFICE O/P EST MOD 30 MIN: CPT | Mod: S$PBB,,, | Performed by: OPHTHALMOLOGY

## 2022-08-23 PROCEDURE — 92134 OCT, RETINA - OU - BOTH EYES: ICD-10-PCS | Mod: 26,S$PBB,, | Performed by: OPHTHALMOLOGY

## 2022-08-23 PROCEDURE — 92134 CPTRZ OPH DX IMG PST SGM RTA: CPT | Mod: PBBFAC | Performed by: OPHTHALMOLOGY

## 2022-08-23 PROCEDURE — 99213 OFFICE O/P EST LOW 20 MIN: CPT | Mod: PBBFAC | Performed by: OPHTHALMOLOGY

## 2022-08-23 PROCEDURE — 99214 PR OFFICE/OUTPT VISIT, EST, LEVL IV, 30-39 MIN: ICD-10-PCS | Mod: S$PBB,,, | Performed by: OPHTHALMOLOGY

## 2022-08-23 PROCEDURE — 99999 PR PBB SHADOW E&M-EST. PATIENT-LVL III: CPT | Mod: PBBFAC,,, | Performed by: OPHTHALMOLOGY

## 2022-08-23 RX ORDER — OMEPRAZOLE 20 MG/1
TABLET, DELAYED RELEASE ORAL EVERY MORNING
COMMUNITY
Start: 2022-07-21

## 2022-08-23 RX ORDER — KETOROLAC TROMETHAMINE 5 MG/ML
1 SOLUTION OPHTHALMIC 2 TIMES DAILY
Qty: 5 ML | Refills: 6 | Status: ON HOLD | OUTPATIENT
Start: 2022-08-23 | End: 2022-09-21 | Stop reason: SDUPTHER

## 2022-08-23 RX ORDER — BRIMONIDINE TARTRATE 2 MG/ML
1 SOLUTION/ DROPS OPHTHALMIC 3 TIMES DAILY
Qty: 15 ML | Refills: 5 | Status: ON HOLD | OUTPATIENT
Start: 2022-08-23 | End: 2022-09-21 | Stop reason: HOSPADM

## 2022-08-23 RX ORDER — LATANOPROST 50 UG/ML
1 SOLUTION/ DROPS OPHTHALMIC NIGHTLY
Qty: 2.5 ML | Refills: 1 | Status: ON HOLD | OUTPATIENT
Start: 2022-08-23 | End: 2022-09-21 | Stop reason: HOSPADM

## 2022-08-23 NOTE — PROGRESS NOTES
HPI     2m fu OCT/DFE OU    Pt states her va is stable since last visit. Occasional floaters OU. No   new symptoms or concerns today.     No flashes  Floaters, Occasional  No pain  Gtts: Ketorolac QD OD   Alphagan TID OS   Restasis BID OU   Latanoprost QHS OS   Refresh every hour OU     Last edited by Ethel Beck on 8/23/2022 10:31 AM. (History)          A/P    ICD-10-CM ICD-9-CM   1. Cystoid macular edema, right  H35.351 362.53   2. Retinal pigment epithelial detachment, right  H35.721 362.42   3. Both eyes affected by degenerative myopia with retinal detachment  H44.2C3 360.21     361.9   4. Primary open angle glaucoma (POAG) of both eyes, severe stage  H40.1133 365.11     365.73       1. Both eyes affected by degenerative myopia with retinal detachment  Hx myopic degeneration with RD, s/p repair OU  Retina flat and attached today, prev was taken care of by Dr. David at     2. Cystoid macular edema, right  F/b outside doc in past, chronic CME OD (due to decentered IOL) has had inj and daily used acular BID with significant dry eye symptoms    Today using Acular daily, IRF better no significant worsened appearance, can decr to every other day    Plan: decr Acular to every other day, if stable next visit can decr to 2x weekly    3. Retinal pigment epithelial detachment, right  Mild PED no heme  Plan: Observation     4. Primary open angle glaucoma (POAG) of both eyes, severe stage  F/b Dr. Frias  IOP 10/14   Plan: Continue Present Management     5. Pseudophakia of both eyes  Decentered IOL OD, stable for now  OS is good position  Plan: Observation     6. Exposure keratopathy  7. Dry eye syndrome, bilateral  OD moreso than OS, patient has hx bleph OU, but patient states her eyelids are open at night and she has to wear goggles  Plan: has appt w Dr Chaudhari in Nov    RTC Julieta - 2 month DFE/OCTm OU  RTC Watson Nov    I saw and examined the patient and reviewed in detail the findings documented. The final examination  findings, image interpretations, and plan as documented in the record represent my personal judgment and conclusions.    Guillremo Rendon MD  Vitreoretinal Surgery   [facility]

## 2022-09-19 ENCOUNTER — HOSPITAL ENCOUNTER (INPATIENT)
Facility: HOSPITAL | Age: 67
LOS: 2 days | Discharge: HOME | DRG: 177 | End: 2022-09-22
Attending: STUDENT IN AN ORGANIZED HEALTH CARE EDUCATION/TRAINING PROGRAM | Admitting: INTERNAL MEDICINE
Payer: MEDICARE

## 2022-09-19 DIAGNOSIS — U07.1 COVID-19: Primary | ICD-10-CM

## 2022-09-19 DIAGNOSIS — R11.2 NAUSEA & VOMITING: ICD-10-CM

## 2022-09-19 DIAGNOSIS — J69.0 ASPIRATION PNEUMONIA OF RIGHT LOWER LOBE DUE TO VOMIT: ICD-10-CM

## 2022-09-19 LAB
ALBUMIN SERPL BCP-MCNC: 4.1 G/DL (ref 3.5–5.2)
ALP SERPL-CCNC: 59 U/L (ref 55–135)
ALT SERPL W/O P-5'-P-CCNC: 22 U/L (ref 10–44)
ANION GAP SERPL CALC-SCNC: 15 MMOL/L (ref 8–16)
AST SERPL-CCNC: 23 U/L (ref 10–40)
BASOPHILS # BLD AUTO: 0.1 K/UL (ref 0–0.2)
BASOPHILS NFR BLD: 0.9 % (ref 0–1.9)
BILIRUB SERPL-MCNC: 0.6 MG/DL (ref 0.1–1)
BILIRUB UR QL STRIP: NEGATIVE
BUN SERPL-MCNC: 25 MG/DL (ref 8–23)
CALCIUM SERPL-MCNC: 9.3 MG/DL (ref 8.7–10.5)
CHLORIDE SERPL-SCNC: 102 MMOL/L (ref 95–110)
CLARITY UR: CLEAR
CO2 SERPL-SCNC: 20 MMOL/L (ref 23–29)
COLOR UR: YELLOW
CREAT SERPL-MCNC: 0.8 MG/DL (ref 0.5–1.4)
DIFFERENTIAL METHOD: ABNORMAL
EOSINOPHIL # BLD AUTO: 0 K/UL (ref 0–0.5)
EOSINOPHIL NFR BLD: 0.3 % (ref 0–8)
ERYTHROCYTE [DISTWIDTH] IN BLOOD BY AUTOMATED COUNT: 13.5 % (ref 11.5–14.5)
EST. GFR  (NO RACE VARIABLE): >60 ML/MIN/1.73 M^2
GLUCOSE SERPL-MCNC: 120 MG/DL (ref 70–110)
GLUCOSE UR QL STRIP: NEGATIVE
HCT VFR BLD AUTO: 38.5 % (ref 37–48.5)
HGB BLD-MCNC: 13 G/DL (ref 12–16)
HGB UR QL STRIP: NEGATIVE
IMM GRANULOCYTES # BLD AUTO: 0.04 K/UL (ref 0–0.04)
IMM GRANULOCYTES NFR BLD AUTO: 0.3 % (ref 0–0.5)
KETONES UR QL STRIP: NEGATIVE
LACTATE SERPL-SCNC: 1.1 MMOL/L (ref 0.5–2.2)
LEUKOCYTE ESTERASE UR QL STRIP: NEGATIVE
LIPASE SERPL-CCNC: 18 U/L (ref 4–60)
LYMPHOCYTES # BLD AUTO: 0.3 K/UL (ref 1–4.8)
LYMPHOCYTES NFR BLD: 2.9 % (ref 18–48)
MCH RBC QN AUTO: 30.6 PG (ref 27–31)
MCHC RBC AUTO-ENTMCNC: 33.8 G/DL (ref 32–36)
MCV RBC AUTO: 91 FL (ref 82–98)
MONOCYTES # BLD AUTO: 1.4 K/UL (ref 0.3–1)
MONOCYTES NFR BLD: 12.2 % (ref 4–15)
NEUTROPHILS # BLD AUTO: 9.6 K/UL (ref 1.8–7.7)
NEUTROPHILS NFR BLD: 83.4 % (ref 38–73)
NITRITE UR QL STRIP: NEGATIVE
NRBC BLD-RTO: 0 /100 WBC
PH UR STRIP: 6 [PH] (ref 5–8)
PLATELET # BLD AUTO: 250 K/UL (ref 150–450)
PMV BLD AUTO: 10.1 FL (ref 9.2–12.9)
POTASSIUM SERPL-SCNC: 3.6 MMOL/L (ref 3.5–5.1)
PROT SERPL-MCNC: 7.3 G/DL (ref 6–8.4)
PROT UR QL STRIP: NEGATIVE
RBC # BLD AUTO: 4.25 M/UL (ref 4–5.4)
SARS-COV-2 RDRP RESP QL NAA+PROBE: POSITIVE
SODIUM SERPL-SCNC: 137 MMOL/L (ref 136–145)
SP GR UR STRIP: 1.02 (ref 1–1.03)
URN SPEC COLLECT METH UR: NORMAL
UROBILINOGEN UR STRIP-ACNC: NEGATIVE EU/DL
WBC # BLD AUTO: 11.52 K/UL (ref 3.9–12.7)

## 2022-09-19 PROCEDURE — U0002 COVID-19 LAB TEST NON-CDC: HCPCS | Performed by: STUDENT IN AN ORGANIZED HEALTH CARE EDUCATION/TRAINING PROGRAM

## 2022-09-19 PROCEDURE — 96361 HYDRATE IV INFUSION ADD-ON: CPT

## 2022-09-19 PROCEDURE — 85025 COMPLETE CBC W/AUTO DIFF WBC: CPT | Performed by: STUDENT IN AN ORGANIZED HEALTH CARE EDUCATION/TRAINING PROGRAM

## 2022-09-19 PROCEDURE — 99285 EMERGENCY DEPT VISIT HI MDM: CPT | Mod: 25,CS

## 2022-09-19 PROCEDURE — 63600175 PHARM REV CODE 636 W HCPCS: Performed by: STUDENT IN AN ORGANIZED HEALTH CARE EDUCATION/TRAINING PROGRAM

## 2022-09-19 PROCEDURE — 93010 EKG 12-LEAD: ICD-10-PCS | Mod: ,,, | Performed by: INTERNAL MEDICINE

## 2022-09-19 PROCEDURE — 93010 ELECTROCARDIOGRAM REPORT: CPT | Mod: ,,, | Performed by: INTERNAL MEDICINE

## 2022-09-19 PROCEDURE — 96367 TX/PROPH/DG ADDL SEQ IV INF: CPT

## 2022-09-19 PROCEDURE — 83605 ASSAY OF LACTIC ACID: CPT | Performed by: STUDENT IN AN ORGANIZED HEALTH CARE EDUCATION/TRAINING PROGRAM

## 2022-09-19 PROCEDURE — 63700000 PHARM REV CODE 250 ALT 637 W/O HCPCS: Performed by: STUDENT IN AN ORGANIZED HEALTH CARE EDUCATION/TRAINING PROGRAM

## 2022-09-19 PROCEDURE — 96365 THER/PROPH/DIAG IV INF INIT: CPT

## 2022-09-19 PROCEDURE — 25000003 PHARM REV CODE 250: Performed by: STUDENT IN AN ORGANIZED HEALTH CARE EDUCATION/TRAINING PROGRAM

## 2022-09-19 PROCEDURE — 81003 URINALYSIS AUTO W/O SCOPE: CPT | Performed by: STUDENT IN AN ORGANIZED HEALTH CARE EDUCATION/TRAINING PROGRAM

## 2022-09-19 PROCEDURE — 83690 ASSAY OF LIPASE: CPT | Performed by: STUDENT IN AN ORGANIZED HEALTH CARE EDUCATION/TRAINING PROGRAM

## 2022-09-19 PROCEDURE — 80053 COMPREHEN METABOLIC PANEL: CPT | Performed by: STUDENT IN AN ORGANIZED HEALTH CARE EDUCATION/TRAINING PROGRAM

## 2022-09-19 PROCEDURE — 93005 ELECTROCARDIOGRAM TRACING: CPT

## 2022-09-19 RX ORDER — AZITHROMYCIN 250 MG/1
500 TABLET, FILM COATED ORAL
Status: COMPLETED | OUTPATIENT
Start: 2022-09-19 | End: 2022-09-19

## 2022-09-19 RX ADMIN — CEFTRIAXONE 1 G: 1 INJECTION, SOLUTION INTRAVENOUS at 11:09

## 2022-09-19 RX ADMIN — AZITHROMYCIN MONOHYDRATE 500 MG: 250 TABLET ORAL at 11:09

## 2022-09-19 RX ADMIN — SODIUM CHLORIDE 500 ML: 0.9 INJECTION, SOLUTION INTRAVENOUS at 10:09

## 2022-09-19 RX ADMIN — PROMETHAZINE HYDROCHLORIDE 12.5 MG: 25 INJECTION INTRAMUSCULAR; INTRAVENOUS at 10:09

## 2022-09-19 NOTE — Clinical Note
Diagnosis: Nausea & vomiting [519700]   Future Attending Provider: VALDEMAR SINGLETON [[id number]]   Admitting Provider:: VALDEMAR SINGLETON [[id number]]

## 2022-09-20 PROBLEM — J69.0 ASPIRATION PNEUMONIA OF RIGHT LOWER LOBE: Status: ACTIVE | Noted: 2022-09-20

## 2022-09-20 PROBLEM — U07.1 COVID-19: Status: ACTIVE | Noted: 2022-09-20

## 2022-09-20 LAB
ALLENS TEST: NORMAL
DELSYS: NORMAL
FIO2: 21 (ref 21–100)
HCO3 UR-SCNC: 25 MMOL/L
LACTATE SERPL-SCNC: 1.3 MMOL/L (ref 0.5–2.2)
PCO2 BLDA: 36 MMHG (ref 35–45)
PH SMN: 7.45 [PH] (ref 7.35–7.45)
PO2 BLDA: 76 MMHG (ref 75–100)
POC BE: 1.2 MMOL/L (ref -2–2)
POC COHB: 0.9 % (ref 0–3)
POC METHB: 1.1 % (ref 0–1.5)
POC O2HB ARTERIAL: 94.7 % (ref 94–100)
POC SATURATED O2: 96.6 % (ref 90–100)
POC TCO2: 26.1 MMOL/L
POC THB: 12.8 G/DL (ref 12–18)
SITE: NORMAL

## 2022-09-20 PROCEDURE — 25000003 PHARM REV CODE 250: Performed by: INTERNAL MEDICINE

## 2022-09-20 PROCEDURE — 25000003 PHARM REV CODE 250: Performed by: SURGERY

## 2022-09-20 PROCEDURE — 27000221 HC OXYGEN, UP TO 24 HOURS

## 2022-09-20 PROCEDURE — 27000207 HC ISOLATION

## 2022-09-20 PROCEDURE — 63600175 PHARM REV CODE 636 W HCPCS: Performed by: STUDENT IN AN ORGANIZED HEALTH CARE EDUCATION/TRAINING PROGRAM

## 2022-09-20 PROCEDURE — 63600175 PHARM REV CODE 636 W HCPCS: Performed by: INTERNAL MEDICINE

## 2022-09-20 PROCEDURE — 83605 ASSAY OF LACTIC ACID: CPT | Performed by: INTERNAL MEDICINE

## 2022-09-20 PROCEDURE — 63600175 PHARM REV CODE 636 W HCPCS: Performed by: SURGERY

## 2022-09-20 PROCEDURE — 11000001 HC ACUTE MED/SURG PRIVATE ROOM

## 2022-09-20 PROCEDURE — 99223 PR INITIAL HOSPITAL CARE,LEVL III: ICD-10-PCS | Mod: CR,,, | Performed by: INTERNAL MEDICINE

## 2022-09-20 PROCEDURE — 36600 WITHDRAWAL OF ARTERIAL BLOOD: CPT

## 2022-09-20 PROCEDURE — 36415 COLL VENOUS BLD VENIPUNCTURE: CPT | Performed by: INTERNAL MEDICINE

## 2022-09-20 PROCEDURE — 94760 N-INVAS EAR/PLS OXIMETRY 1: CPT

## 2022-09-20 PROCEDURE — 99900031 HC PATIENT EDUCATION (STAT)

## 2022-09-20 PROCEDURE — 82803 BLOOD GASES ANY COMBINATION: CPT | Performed by: INTERNAL MEDICINE

## 2022-09-20 PROCEDURE — 25000003 PHARM REV CODE 250: Performed by: STUDENT IN AN ORGANIZED HEALTH CARE EDUCATION/TRAINING PROGRAM

## 2022-09-20 PROCEDURE — 99223 1ST HOSP IP/OBS HIGH 75: CPT | Mod: CR,,, | Performed by: INTERNAL MEDICINE

## 2022-09-20 RX ORDER — HYDROMORPHONE HYDROCHLORIDE 1 MG/ML
0.2 INJECTION, SOLUTION INTRAMUSCULAR; INTRAVENOUS; SUBCUTANEOUS
Status: COMPLETED | OUTPATIENT
Start: 2022-09-20 | End: 2022-09-20

## 2022-09-20 RX ORDER — LEVOTHYROXINE SODIUM 88 UG/1
88 TABLET ORAL
Status: DISCONTINUED | OUTPATIENT
Start: 2022-09-20 | End: 2022-09-22 | Stop reason: HOSPADM

## 2022-09-20 RX ORDER — CLINDAMYCIN PHOSPHATE 600 MG/50ML
600 INJECTION, SOLUTION INTRAVENOUS
Status: DISCONTINUED | OUTPATIENT
Start: 2022-09-20 | End: 2022-09-22 | Stop reason: HOSPADM

## 2022-09-20 RX ORDER — TACROLIMUS 0.5 MG/1
1.5 CAPSULE ORAL 2 TIMES DAILY
Status: DISCONTINUED | OUTPATIENT
Start: 2022-09-20 | End: 2022-09-22 | Stop reason: HOSPADM

## 2022-09-20 RX ORDER — MYCOPHENOLIC ACID 180 MG/1
180 TABLET, DELAYED RELEASE ORAL 2 TIMES DAILY
Status: DISCONTINUED | OUTPATIENT
Start: 2022-09-20 | End: 2022-09-22 | Stop reason: HOSPADM

## 2022-09-20 RX ORDER — AZITHROMYCIN 250 MG/1
500 TABLET, FILM COATED ORAL DAILY
Status: DISCONTINUED | OUTPATIENT
Start: 2022-09-21 | End: 2022-09-20

## 2022-09-20 RX ORDER — NIFEDIPINE 30 MG/1
30 TABLET, EXTENDED RELEASE ORAL DAILY
Status: DISCONTINUED | OUTPATIENT
Start: 2022-09-20 | End: 2022-09-22 | Stop reason: HOSPADM

## 2022-09-20 RX ORDER — CARVEDILOL 3.12 MG/1
6.25 TABLET ORAL 2 TIMES DAILY
Status: DISCONTINUED | OUTPATIENT
Start: 2022-09-20 | End: 2022-09-22 | Stop reason: HOSPADM

## 2022-09-20 RX ORDER — LEVOTHYROXINE SODIUM 88 UG/1
88 TABLET ORAL
Status: DISCONTINUED | OUTPATIENT
Start: 2022-09-20 | End: 2022-09-20

## 2022-09-20 RX ORDER — SODIUM CHLORIDE 0.9 % (FLUSH) 0.9 %
10 SYRINGE (ML) INJECTION
Status: DISCONTINUED | OUTPATIENT
Start: 2022-09-20 | End: 2022-09-22 | Stop reason: HOSPADM

## 2022-09-20 RX ORDER — TALC
6 POWDER (GRAM) TOPICAL NIGHTLY PRN
Status: DISCONTINUED | OUTPATIENT
Start: 2022-09-20 | End: 2022-09-22 | Stop reason: HOSPADM

## 2022-09-20 RX ORDER — MAG HYDROX/ALUMINUM HYD/SIMETH 200-200-20
30 SUSPENSION, ORAL (FINAL DOSE FORM) ORAL ONCE
Status: COMPLETED | OUTPATIENT
Start: 2022-09-20 | End: 2022-09-20

## 2022-09-20 RX ORDER — IBUPROFEN 400 MG/1
400 TABLET ORAL EVERY 6 HOURS PRN
Status: DISCONTINUED | OUTPATIENT
Start: 2022-09-20 | End: 2022-09-21

## 2022-09-20 RX ADMIN — TACROLIMUS 1.5 MG: 0.5 CAPSULE ORAL at 08:09

## 2022-09-20 RX ADMIN — CLINDAMYCIN IN 5 PERCENT DEXTROSE 600 MG: 12 INJECTION, SOLUTION INTRAVENOUS at 09:09

## 2022-09-20 RX ADMIN — CARVEDILOL 6.25 MG: 3.12 TABLET, FILM COATED ORAL at 09:09

## 2022-09-20 RX ADMIN — CARVEDILOL 6.25 MG: 3.12 TABLET, FILM COATED ORAL at 08:09

## 2022-09-20 RX ADMIN — HYDROMORPHONE HYDROCHLORIDE 0.2 MG: 1 INJECTION, SOLUTION INTRAMUSCULAR; INTRAVENOUS; SUBCUTANEOUS at 03:09

## 2022-09-20 RX ADMIN — MYCOPHENOLIC ACID 180 MG: 180 TABLET, DELAYED RELEASE ORAL at 12:09

## 2022-09-20 RX ADMIN — HYDROMORPHONE HYDROCHLORIDE 0.2 MG: 1 INJECTION, SOLUTION INTRAMUSCULAR; INTRAVENOUS; SUBCUTANEOUS at 12:09

## 2022-09-20 RX ADMIN — REMDESIVIR 200 MG: 100 INJECTION, POWDER, LYOPHILIZED, FOR SOLUTION INTRAVENOUS at 12:09

## 2022-09-20 RX ADMIN — PROMETHAZINE HYDROCHLORIDE 25 MG: 25 INJECTION INTRAMUSCULAR; INTRAVENOUS at 09:09

## 2022-09-20 RX ADMIN — TACROLIMUS 1.5 MG: 0.5 CAPSULE ORAL at 06:09

## 2022-09-20 RX ADMIN — CLINDAMYCIN IN 5 PERCENT DEXTROSE 600 MG: 12 INJECTION, SOLUTION INTRAVENOUS at 02:09

## 2022-09-20 RX ADMIN — LEVOTHYROXINE SODIUM 88 MCG: 88 TABLET ORAL at 06:09

## 2022-09-20 RX ADMIN — NIFEDIPINE 30 MG: 30 TABLET, FILM COATED, EXTENDED RELEASE ORAL at 08:09

## 2022-09-20 RX ADMIN — IBUPROFEN 400 MG: 400 TABLET, FILM COATED ORAL at 10:09

## 2022-09-20 RX ADMIN — ALUMINUM HYDROXIDE, MAGNESIUM HYDROXIDE, AND SIMETHICONE 30 ML: 200; 200; 20 SUSPENSION ORAL at 04:09

## 2022-09-20 RX ADMIN — MYCOPHENOLIC ACID 180 MG: 180 TABLET, DELAYED RELEASE ORAL at 09:09

## 2022-09-20 RX ADMIN — PROMETHAZINE HYDROCHLORIDE 12.5 MG: 25 INJECTION INTRAMUSCULAR; INTRAVENOUS at 03:09

## 2022-09-20 NOTE — ASSESSMENT & PLAN NOTE
Patient is identified as High risk for severe complications of COVID 19 based on COVID risk score of 3   Initiate standard COVID protocols; COVID-19 testing ,Infection Control notification  and isolation- respiratory, contact and droplet per protocol    Diagnostics: (leukopenia, hyponatremia, hyperferritinemia, elevated troponin, elevated d-dimer, age, and comorbidities are significant predictors of poor clinical outcome)  CBC, CMP and Portable CXR  Will need remdesivir   Remdesivir, an antiviral drug used to treat COVID-19, increased the likelihood of clinical improvement in COVID-19 patients on low-flow oxygen or no oxygen, according to a new study authored by the [employer], the [employer], [facility], and [facility].

## 2022-09-20 NOTE — PLAN OF CARE
09/20/22 0812   ED Admissions Case Approval   ED Admissions Case Approval  Approved       Chart review complete. Admission criteria met at inpatient level of care.

## 2022-09-20 NOTE — PLAN OF CARE
Pt VSS, up SBA with some weakness, RA, A&Ox4, Right arm not to be used for stcks or BP, Lungs diminished, Covid positive 9/19/22.     Problem: Adult Inpatient Plan of Care  Goal: Plan of Care Review  Outcome: Ongoing, Progressing  Goal: Patient-Specific Goal (Individualized)  Outcome: Ongoing, Progressing  Goal: Absence of Hospital-Acquired Illness or Injury  Outcome: Ongoing, Progressing  Goal: Optimal Comfort and Wellbeing  Outcome: Ongoing, Progressing  Goal: Readiness for Transition of Care  Outcome: Ongoing, Progressing

## 2022-09-20 NOTE — ASSESSMENT & PLAN NOTE
With vomiting and RLL pneumonia   Aspiration pneumonia likely   DC ceftriaxone and zithromax  Start IV clindamycin

## 2022-09-20 NOTE — HPI
Claudette Louise Voss is a 67 y.o. female  with history of prior kidney transplant, CKD, recent COVID diagnosis, presenting with nausea and vomiting.   She started with fever two days ago;  covid positive ; she was positive for covid at home   She has fevers ;chills ;nauseated ; vomiting .   Patient vomited 2 times today.  Denies abdominal pain.  No dysuria or back pain.  No diarrhea.  No chest pain.  Patient denies fever.  Attempted to take a Phenergan tablet, but vomited it up.  No other complaints. Work up in ER showed R basilar opacity consistent with pneumonia . She has been placed on IV Rocephin and IV zithromax .    She is placed in observation for pneumonia and COVID treatment .

## 2022-09-20 NOTE — ED PROVIDER NOTES
Encounter Date: 9/19/2022       History     Chief Complaint   Patient presents with    Emesis     Patient is a kidney transplant patient dx with COVID today having fever, body aches and vomiting that started today      67-year-old female with history of prior kidney transplant, CKD, recent COVID diagnosis, presenting with nausea and vomiting.  Patient vomited 2 times today.  Denies abdominal pain.  No dysuria or back pain.  No diarrhea.  No chest pain.  Patient denies fever.  Attempted to take a Phenergan tablet, but vomited it up.  No other complaints.    Review of patient's allergies indicates:   Allergen Reactions    Betamethasone acet,sod phos      Other reaction(s): Propensity to adverse reactions to drug    Cayenne Other (See Comments)    Acetaminophen Rash    Aspirin Rash    Vancomycin Rash    Yeast, dried Rash     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 breast 8/25/2004    PKD (polycystic kidney disease) - S/P nephrectomies 11/29/2013    Renal hypertension      Past Surgical History:   Procedure Laterality Date    AV FISTULA PLACEMENT      multiple access     BREAST LUMPECTOMY  2004    CHOLECYSTECTOMY  1987    open    COLONOSCOPY      HERNIA REPAIR  2012    KIDNEY TRANSPLANT      LYMPH NODE DISSECTION  2004    with breast cancer treatment    NEPHRECTOMY  2012    bilateral    PARATHYROIDECTOMY Bilateral 12/23/2021    Procedure: PARATHYROIDECTOMY;  Surgeon: Gera Frank MD;  Location: [facility] OR [address] [place] [address];  Service: ENT;  Laterality: Bilateral;    SPLENECTOMY, TOTAL  2012      Family History   Problem Relation Age of Onset    Stroke Mother     Diabetes Mother     Kidney disease Father         PKD    Kidney disease Sister         PKD    Cancer Neg Hx      Social History     Tobacco Use    Smoking status: Never    Smokeless tobacco: Never   Substance Use Topics    Alcohol use: No    Drug use: No     Review of Systems   Constitutional:  Negative for fever.   HENT:  Positive for congestion. Negative for sore throat.    Respiratory:  Positive for cough. Negative for shortness of breath.    Cardiovascular:  Negative for chest pain.   Gastrointestinal:  Positive for nausea and vomiting. Negative for abdominal pain and diarrhea.   Genitourinary:  Negative for dysuria.   Musculoskeletal:  Negative for back pain.   Skin:  Negative for rash.   Neurological:  Negative for weakness.   Hematological:  Does not bruise/bleed easily.     Physical Exam     Initial Vitals [09/19/22 2211]   BP Pulse Resp Temp SpO2   (!) 145/65 103 18 98.3 °F (36.8 °C) 98 %      MAP       --         Physical Exam    Nursing note and vitals reviewed.  Constitutional: She appears well-developed. She is not diaphoretic. No distress.   HENT:   Head: Normocephalic.   Eyes: Pupils are equal, round, and reactive to light.   Neck:   Normal range of motion.  Cardiovascular:            No murmur heard.  Pulmonary/Chest: No respiratory distress.   Clear lungs bilaterally.  No respiratory distress.  No wheezing or rales.  Good air movement.     Abdominal: Abdomen is soft.   No TTP diffusely. No guarding, rebound, or masses. Negative Clarke's sign. No TTP at McBurney's point. No CVAT bilaterally.   Musculoskeletal:         General: No edema.      Cervical back: Normal range of motion.     Neurological: She is alert.   Skin: Skin is warm.   Psychiatric: She has a normal mood and affect.       ED Course   Procedures  Labs Reviewed   CBC W/ AUTO DIFFERENTIAL - Abnormal; Notable for the following components:       Result Value    Gran #  (ANC) 9.6 (*)     Lymph # 0.3 (*)     Mono # 1.4 (*)     Gran % 83.4 (*)     Lymph % 2.9 (*)     All other components within normal limits   COMPREHENSIVE METABOLIC PANEL - Abnormal; Notable for the following components:    CO2 20 (*)     Glucose 120 (*)     BUN 25 (*)     All other components within normal limits   SARS-COV-2 RNA AMPLIFICATION, QUAL - Abnormal; Notable for the following components:    SARS-CoV-2 RNA, Amplification, Qual Positive (*)     All other components within normal limits   LIPASE   LACTIC ACID, PLASMA   URINALYSIS, REFLEX TO URINE CULTURE    Narrative:     Specimen Source->Urine     EKG Readings: (Independently Interpreted)   Initial Reading: No STEMI. Rhythm: Normal Sinus Rhythm. Heart Rate: 98. Ectopy: No Ectopy. Conduction: Normal.     Imaging Results              X-Ray Chest AP Portable (Final result)  Result time 09/19/22 23:01:17      Final result by Pollo Parrish MD (09/19/22 23:01:17)                   Impression:      Right basilar opacity, suggestive of pneumonia.      Electronically signed by: Pollo Parrish MD  Date:    09/19/2022  Time:    23:01               Narrative:    EXAMINATION:  XR CHEST AP PORTABLE    CLINICAL HISTORY:  Nausea and vomiting. COVID+, r/o PNA;    TECHNIQUE:  Single frontal view of the chest was performed.    COMPARISON:  08/15/2019.    FINDINGS:  Monitoring EKG leads are present.  The trachea is unremarkable.  The cardiomediastinal silhouette is within normal limits.  There is no evidence of free air beneath the hemidiaphragms.  No pleural effusion.  There is no evidence of a pneumothorax.  There is no evidence of pneumomediastinum.  There is a right basilar opacity.    There is S-shaped scoliosis.  There are postoperative changes in the right upper abdominal quadrant.                                       Medications   levothyroxine tablet 88 mcg (has no administration in time range)   sodium chloride 0.9% flush 10 mL (has no administration in time range)    melatonin tablet 6 mg (has no administration in time range)   cefTRIAXone (ROCEPHIN) 1 g/50 mL D5W IVPB (has no administration in time range)   azithromycin tablet 500 mg (has no administration in time range)   sodium chloride 0.9% bolus 500 mL (0 mLs Intravenous Stopped 9/19/22 2328)   promethazine (PHENERGAN) 12.5 mg in dextrose 5 % 50 mL IVPB (0 mg Intravenous Stopped 9/19/22 2249)   cefTRIAXone (ROCEPHIN) 1 g/50 mL D5W IVPB (0 g Intravenous Stopped 9/20/22 0004)   azithromycin tablet 500 mg (500 mg Oral Given 9/19/22 2332)   HYDROmorphone injection 0.2 mg (0.2 mg Intravenous Given 9/20/22 0053)   HYDROmorphone injection 0.2 mg (0.2 mg Intravenous Given 9/20/22 0315)   promethazine (PHENERGAN) 12.5 mg in dextrose 5 % 50 mL IVPB (12.5 mg Intravenous New Bag 9/20/22 0316)     Medical Decision Making:   Differential Diagnosis:   DDX: Unlikely acute abdominal pathology such as appendicitis/cholecystitis given benign abdomen, negative Clarke's sign at this time. R/o pancreatitis, UTI. Possible GERD/gastritis vs. AGE given history, benign abdomen.  Low suspicion for focal pneumonia given patient's symptoms, but will screen with chest x-ray  DX: BMP, CBC, LFT, lipase, UA/Udip.  Will perform chest x-ray given recent COVID diagnosis.  Serial abdominal exams. Consider CT A/P if change in abdominal exam to assess for early appendicitis. Consider ultrasound if change in abdominal exam to assess for cholecystitis.  TX: Analgesia PRN. Antiemetic PRN. IVF. Treatment/consult as indicated by studies.  Dispo: Pending studies. If studies WNL, symptoms controlled, tolerating PO, discharge to follow up with primary doctor within 2 days with recommendations for supportive care at home and strict precautions for return.               ED Course as of 09/20/22 0345   Mon Sep 19, 2022   2305 Creatinine: 0.8 [NB]   2339 Patient reports feeling slightly better after the Phenergan.  Informed about pneumonia.  Will reassess after IV  antibiotics and determine disposition.  Vital stable. [NB]   Tue Sep 20, 2022   0340 Dawson Kong 3:40 AM  Discussed results, findings, supportive care with patient. Patient verbalized understanding and agreement. Patient given opportunity to ask all questions. Patient being admitted to hospital for further workup.     [NB]      ED Course User Index  [NB] Dawson Kong MD                 Clinical Impression:   Final diagnoses:  [R11.2] Nausea & vomiting        ED Disposition Condition    Observation Stable                Dawson Kong MD  09/19/22 2221       Dawson Knog MD  09/19/22 2234       Dawson Kong MD  09/20/22 0340       Dawson Kong MD  09/20/22 0345

## 2022-09-20 NOTE — H&P
[facility] Medicine  History & Physical    Patient Name: Claudette Louise Voss  MRN: [id number]  Patient Class: OP- Observation  Admission Date: 9/19/2022  Attending Physician: Roberto Yeboah MD   Primary Care Provider: Gera Arora MD         Patient information was obtained from patient, past medical records and ER records.     Subjective:     Principal Problem:Aspiration pneumonia of right lower lobe    Chief Complaint:   Chief Complaint   Patient presents with    Emesis     Patient is a kidney transplant patient dx with COVID today having fever, body aches and vomiting that started today         HPI: Claudette Louise Voss is a 67 y.o. female  with history of prior kidney transplant, CKD, recent COVID diagnosis, presenting with nausea and vomiting.   She started with fever two days ago;  covid positive ; she was positive for covid at home   She has fevers ;chills ;nauseated ; vomiting .   Patient vomited 2 times today.  Denies abdominal pain.  No dysuria or back pain.  No diarrhea.  No chest pain.  Patient denies fever.  Attempted to take a Phenergan tablet, but vomited it up.  No other complaints. Work up in ER showed R basilar opacity consistent with pneumonia . She has been placed on IV Rocephin and IV zithromax .    She is placed in observation for pneumonia and COVID treatment .           Past Medical History:   Diagnosis Date    Breast cancer - 2004 11/29/2013    Chronic immunosuppression with Prograf and MMF 1/16/2015    CKD (chronic kidney disease) stage 3, GFR 30-59 ml/min     Colon polyps 11/29/2013    ESRD (end stage renal disease) 11/29/2013    GERD (gastroesophageal reflux disease) 11/29/2013    Glaucoma 11/29/2013    Hyperlipidemia 11/29/2013    Hypertension 11/29/2013    Hypothyroidism 11/29/2013    Malignant neoplasm of upper-outer quadrant of left female breast 8/25/2004    PKD (polycystic kidney disease) - S/P nephrectomies 11/29/2013    Renal  hypertension        Past Surgical History:   Procedure Laterality Date    AV FISTULA PLACEMENT      multiple access     BREAST LUMPECTOMY  2004    CHOLECYSTECTOMY  1987    open    COLONOSCOPY      HERNIA REPAIR  2012    KIDNEY TRANSPLANT      LYMPH NODE DISSECTION  2004    with breast cancer treatment    NEPHRECTOMY  2012    bilateral    PARATHYROIDECTOMY Bilateral 12/23/2021    Procedure: PARATHYROIDECTOMY;  Surgeon: Gera Frank MD;  Location: [facility] OR [address] [place] [address];  Service: ENT;  Laterality: Bilateral;    SPLENECTOMY, TOTAL  2012       Review of patient's allergies indicates:   Allergen Reactions    Betamethasone acet,sod phos      Other reaction(s): Propensity to adverse reactions to drug    Cayenne Other (See Comments)    Acetaminophen Rash    Aspirin Rash    Vancomycin Rash    Yeast, dried Rash       No current facility-administered medications on file prior to encounter.     Current Outpatient Medications on File Prior to Encounter   Medication Sig    artificial tears,hypromellose, (SYSTANE GEL) 0.3 % Gel Apply 2 drops to eye 3 (three) times daily as needed.    atorvastatin (LIPITOR) 20 MG tablet Take 20 mg by mouth once daily.    brimonidine 0.15 % OPTH DROP (ALPHAGAN) 0.15 % ophthalmic solution Place 1 drop into the left eye 3 (three) times daily.    brimonidine 0.2% (ALPHAGAN) 0.2 % Drop Place 1 drop into the left eye 3 (three) times daily.    carvedilol (COREG) 6.25 MG tablet Take 1 tablet (6.25 mg total) by mouth 2 (two) times daily.    cycloSPORINE-chondroit sulf A 0.1-0.25 % Drop Place 1 drop into both eyes 2 (two) times a day.    fluoxetine (PROZAC) 20 MG capsule Take 1 capsule (20 mg total) by mouth every evening.    ketorolac 0.5% (ACULAR) 0.5 % Drop Place 1 drop into the right eye 2 (two) times daily.    latanoprost (XALATAN) 0.005 % ophthalmic solution Place 1 drop into the left eye every evening.    latanoprost 0.005 % ophthalmic solution Place 1 drop into the left eye  every evening.    levothyroxine (SYNTHROID) 88 MCG tablet Take 88 mcg by mouth once daily.    magnesium oxide (MAG-OX) 400 mg (241.3 mg magnesium) tablet Take 400 mg by mouth 2 (two) times daily.    mycophenolate (MYFORTIC) 180 MG TbEC Take 1 tablet (180 mg total) by mouth 2 (two) times daily.    nifedipine 30 MG ORAL TR24 (PROCARDIA-XL) 30 MG (OSM) 24 hr tablet Take 1 tablet (30 mg total) by mouth once daily.    omeprazole (PRILOSEC OTC) 20 MG tablet Take 1 capsule orally 2 times a day.    RESTASIS 0.05 % ophthalmic emulsion     tacrolimus (PROGRAF) 0.5 MG Cap Take 3 capsules (1.5 mg total) by mouth every morning AND 3 capsules (1.5 mg total) every evening.    vitamin D (VITAMIN D3) 1000 units Tab Take 1,000 Units by mouth 2 (two) times daily.     Family History       Problem Relation (Age of Onset)    Diabetes Mother    Kidney disease Father, Sister    Stroke Mother          Tobacco Use    Smoking status: Never    Smokeless tobacco: Never   Substance and Sexual Activity    Alcohol use: No    Drug use: No    Sexual activity: Not on file     Review of Systems   Constitutional:  Negative for chills, fatigue, fever and unexpected weight change.   HENT:  Negative for congestion, ear pain, sore throat and trouble swallowing.    Eyes:  Negative for pain and visual disturbance.   Respiratory:  Positive for cough. Negative for chest tightness and shortness of breath.    Cardiovascular:  Negative for chest pain, palpitations and leg swelling.   Gastrointestinal:  Positive for nausea and vomiting. Negative for abdominal distention, abdominal pain, constipation and diarrhea.   Genitourinary:  Negative for difficulty urinating, dysuria, flank pain, frequency and hematuria.   Musculoskeletal:  Negative for back pain, gait problem, joint swelling, neck pain and neck stiffness.   Skin:  Negative for rash and wound.   Neurological:  Negative for dizziness, seizures, speech difficulty, light-headedness and headaches.    Objective:     Vital Signs (Most Recent):  Temp: 99.1 °F (37.3 °C) (09/20/22 0739)  Pulse: 88 (09/20/22 0732)  Resp: (!) 21 (09/20/22 0640)  BP: (!) 156/70 (09/20/22 0822)  SpO2: 98 % (09/20/22 0732)   Vital Signs (24h Range):  Temp:  [98.3 °F (36.8 °C)-99.1 °F (37.3 °C)] 99.1 °F (37.3 °C)  Pulse:  [] 88  Resp:  [17-22] 21  SpO2:  [94 %-99 %] 98 %  BP: (125-181)/() 156/70     Weight: 63.5 kg (140 lb)  Body mass index is 28.28 kg/m².    Physical Exam  Vitals and nursing note reviewed.   Constitutional:       Appearance: She is well-developed.   HENT:      Head: Normocephalic and atraumatic.   Cardiovascular:      Rate and Rhythm: Normal rate and regular rhythm.      Heart sounds: Normal heart sounds.   Pulmonary:      Effort: Pulmonary effort is normal.      Breath sounds: Normal breath sounds.   Abdominal:      General: Bowel sounds are normal.      Palpations: Abdomen is soft.      Tenderness: There is no abdominal tenderness.   Skin:     General: Skin is warm and dry.   Neurological:      Mental Status: She is alert and oriented to person, place, and time.   Psychiatric:         Behavior: Behavior normal.         Thought Content: Thought content normal.         Judgment: Judgment normal.           Significant Labs: All pertinent labs within the past 24 hours have been reviewed.  ABGs: No results for input(s): PH, PCO2, HCO3, POCSATURATED, BE, TOTALHB, COHB, METHB, O2HB, POCFIO2, PO2 in the last 48 hours.  CBC:   Recent Labs   Lab 09/19/22 2223   WBC 11.52   HGB 13.0   HCT 38.5        CMP:   Recent Labs   Lab 09/19/22 2223      K 3.6      CO2 20*   *   BUN 25*   CREATININE 0.8   CALCIUM 9.3   PROT 7.3   ALBUMIN 4.1   BILITOT 0.6   ALKPHOS 59   AST 23   ALT 22   ANIONGAP 15     Lipase:   Recent Labs   Lab 09/19/22  2223   LIPASE 18     Troponin: No results for input(s): TROPONINI in the last 48 hours.  Urine Studies:   Recent Labs   Lab 09/19/22  2337   COLORU Yellow    APPEARANCEUA Clear   PHUR 6.0   SPECGRAV 1.020   PROTEINUA Negative   GLUCUA Negative   KETONESU Negative   BILIRUBINUA Negative   OCCULTUA Negative   NITRITE Negative   UROBILINOGEN Negative   LEUKOCYTESUR Negative       Significant Imaging: I have reviewed all pertinent imaging results/findings within the past 24 hours.  CXR: I have reviewed all pertinent results/findings within the past 24 hours and my personal findings are:       Monitoring EKG leads are present.  The trachea is unremarkable.  The cardiomediastinal silhouette is within normal limits.  There is no evidence of free air beneath the hemidiaphragms.  No pleural effusion.  There is no evidence of a pneumothorax.  There is no evidence of pneumomediastinum.  There is a right basilar opacity.     There is S-shaped scoliosis.  There are postoperative changes in the right upper abdominal quadrant.     Impression:     Right basilar opacity, suggestive of pneumonia.         Assessment/Plan:     * Aspiration pneumonia of right lower lobe  With vomiting and RLL pneumonia   Aspiration pneumonia likely   DC ceftriaxone and zithromax  Start IV clindamycin         COVID-19  Patient is identified as High risk for severe complications of COVID 19 based on COVID risk score of 3   Initiate standard COVID protocols; COVID-19 testing ,Infection Control notification  and isolation- respiratory, contact and droplet per protocol    Diagnostics: (leukopenia, hyponatremia, hyperferritinemia, elevated troponin, elevated d-dimer, age, and comorbidities are significant predictors of poor clinical outcome)  CBC, CMP and Portable CXR  Will need remdesivir   Remdesivir, an antiviral drug used to treat COVID-19, increased the likelihood of clinical improvement in COVID-19 patients on low-flow oxygen or no oxygen, according to a new study authored by the [facility] [employer], the [facility] [employer], [facility], and  [employer].    Chronic immunosuppression with Prograf and MMF  Resume her home meds        Status post -donor kidney transplant for PKD - 1/16/15  Continue tacrolimus      Hypothyroidism  Resume levothyroxine      Hyperlipidemia  Lab Results   Component Value Date    LDLCALC 64.4 2015             VTE Risk Mitigation (From admission, onward)         Ordered     IP VTE HIGH RISK PATIENT  Once         22     Place sequential compression device  Until discontinued         22                   Roberto Yeboah MD  Department of Hospital Medicine   [facility] - Emergency Dept

## 2022-09-21 LAB
ALBUMIN SERPL BCP-MCNC: 3.6 G/DL (ref 3.5–5.2)
ALP SERPL-CCNC: 56 U/L (ref 55–135)
ALT SERPL W/O P-5'-P-CCNC: 28 U/L (ref 10–44)
ANION GAP SERPL CALC-SCNC: 13 MMOL/L (ref 8–16)
AST SERPL-CCNC: 36 U/L (ref 10–40)
BASOPHILS # BLD AUTO: 0.01 K/UL (ref 0–0.2)
BASOPHILS NFR BLD: 0.1 % (ref 0–1.9)
BILIRUB SERPL-MCNC: 0.5 MG/DL (ref 0.1–1)
BUN SERPL-MCNC: 16 MG/DL (ref 8–23)
CALCIUM SERPL-MCNC: 8.8 MG/DL (ref 8.7–10.5)
CHLORIDE SERPL-SCNC: 102 MMOL/L (ref 95–110)
CO2 SERPL-SCNC: 24 MMOL/L (ref 23–29)
CREAT SERPL-MCNC: 1 MG/DL (ref 0.5–1.4)
DIFFERENTIAL METHOD: ABNORMAL
EOSINOPHIL # BLD AUTO: 0 K/UL (ref 0–0.5)
EOSINOPHIL NFR BLD: 0 % (ref 0–8)
ERYTHROCYTE [DISTWIDTH] IN BLOOD BY AUTOMATED COUNT: 13.9 % (ref 11.5–14.5)
EST. GFR  (NO RACE VARIABLE): >60 ML/MIN/1.73 M^2
GLUCOSE SERPL-MCNC: 95 MG/DL (ref 70–110)
HCT VFR BLD AUTO: 38.5 % (ref 37–48.5)
HGB BLD-MCNC: 13.1 G/DL (ref 12–16)
IMM GRANULOCYTES # BLD AUTO: 0.01 K/UL (ref 0–0.04)
IMM GRANULOCYTES NFR BLD AUTO: 0.1 % (ref 0–0.5)
LYMPHOCYTES # BLD AUTO: 1.2 K/UL (ref 1–4.8)
LYMPHOCYTES NFR BLD: 17.3 % (ref 18–48)
MCH RBC QN AUTO: 30.6 PG (ref 27–31)
MCHC RBC AUTO-ENTMCNC: 34 G/DL (ref 32–36)
MCV RBC AUTO: 90 FL (ref 82–98)
MONOCYTES # BLD AUTO: 1.6 K/UL (ref 0.3–1)
MONOCYTES NFR BLD: 23 % (ref 4–15)
NEUTROPHILS # BLD AUTO: 4.1 K/UL (ref 1.8–7.7)
NEUTROPHILS NFR BLD: 59.5 % (ref 38–73)
NRBC BLD-RTO: 0 /100 WBC
PLATELET # BLD AUTO: 215 K/UL (ref 150–450)
PMV BLD AUTO: 10.3 FL (ref 9.2–12.9)
POTASSIUM SERPL-SCNC: 3.4 MMOL/L (ref 3.5–5.1)
PROT SERPL-MCNC: 6.7 G/DL (ref 6–8.4)
RBC # BLD AUTO: 4.28 M/UL (ref 4–5.4)
SODIUM SERPL-SCNC: 139 MMOL/L (ref 136–145)
WBC # BLD AUTO: 6.84 K/UL (ref 3.9–12.7)

## 2022-09-21 PROCEDURE — 99238 PR HOSPITAL DISCHARGE DAY,<30 MIN: ICD-10-PCS | Mod: CR,,, | Performed by: FAMILY MEDICINE

## 2022-09-21 PROCEDURE — 27000207 HC ISOLATION

## 2022-09-21 PROCEDURE — 85025 COMPLETE CBC W/AUTO DIFF WBC: CPT | Performed by: NURSE PRACTITIONER

## 2022-09-21 PROCEDURE — 25000003 PHARM REV CODE 250: Performed by: INTERNAL MEDICINE

## 2022-09-21 PROCEDURE — 94761 N-INVAS EAR/PLS OXIMETRY MLT: CPT

## 2022-09-21 PROCEDURE — 25000003 PHARM REV CODE 250: Performed by: SURGERY

## 2022-09-21 PROCEDURE — 80053 COMPREHEN METABOLIC PANEL: CPT | Performed by: NURSE PRACTITIONER

## 2022-09-21 PROCEDURE — 63600175 PHARM REV CODE 636 W HCPCS: Performed by: INTERNAL MEDICINE

## 2022-09-21 PROCEDURE — 99238 HOSP IP/OBS DSCHRG MGMT 30/<: CPT | Mod: CR,,, | Performed by: FAMILY MEDICINE

## 2022-09-21 PROCEDURE — 25000003 PHARM REV CODE 250: Performed by: NURSE PRACTITIONER

## 2022-09-21 PROCEDURE — 63600175 PHARM REV CODE 636 W HCPCS: Performed by: SURGERY

## 2022-09-21 PROCEDURE — 36415 COLL VENOUS BLD VENIPUNCTURE: CPT | Performed by: NURSE PRACTITIONER

## 2022-09-21 PROCEDURE — 25000003 PHARM REV CODE 250: Performed by: FAMILY MEDICINE

## 2022-09-21 PROCEDURE — 25000003 PHARM REV CODE 250: Performed by: STUDENT IN AN ORGANIZED HEALTH CARE EDUCATION/TRAINING PROGRAM

## 2022-09-21 PROCEDURE — 94760 N-INVAS EAR/PLS OXIMETRY 1: CPT

## 2022-09-21 PROCEDURE — 11000001 HC ACUTE MED/SURG PRIVATE ROOM

## 2022-09-21 RX ORDER — L. ACIDOPHILUS/L.BULGARICUS 100MM CELL
1 GRANULES IN PACKET (EA) ORAL 2 TIMES DAILY
Status: DISCONTINUED | OUTPATIENT
Start: 2022-09-21 | End: 2022-09-22 | Stop reason: HOSPADM

## 2022-09-21 RX ORDER — CLINDAMYCIN HYDROCHLORIDE 300 MG/1
300 CAPSULE ORAL 3 TIMES DAILY
Qty: 24 CAPSULE | Refills: 0 | Status: SHIPPED | OUTPATIENT
Start: 2022-09-21 | End: 2022-09-29

## 2022-09-21 RX ORDER — DIPHENOXYLATE HYDROCHLORIDE AND ATROPINE SULFATE 2.5; .025 MG/1; MG/1
1 TABLET ORAL 2 TIMES DAILY PRN
Status: DISCONTINUED | OUTPATIENT
Start: 2022-09-21 | End: 2022-09-22 | Stop reason: HOSPADM

## 2022-09-21 RX ORDER — ACETAMINOPHEN 500 MG
500 TABLET ORAL EVERY 6 HOURS PRN
Status: DISCONTINUED | OUTPATIENT
Start: 2022-09-21 | End: 2022-09-22 | Stop reason: HOSPADM

## 2022-09-21 RX ORDER — FAMOTIDINE 20 MG/1
20 TABLET, FILM COATED ORAL DAILY
Status: DISCONTINUED | OUTPATIENT
Start: 2022-09-21 | End: 2022-09-22 | Stop reason: HOSPADM

## 2022-09-21 RX ORDER — L. ACIDOPHILUS/L.BULGARICUS 100MM CELL
1 GRANULES IN PACKET (EA) ORAL 2 TIMES DAILY
Qty: 30 PACKET | Refills: 0 | Status: SHIPPED | OUTPATIENT
Start: 2022-09-21 | End: 2023-03-08

## 2022-09-21 RX ORDER — KETOROLAC TROMETHAMINE 5 MG/ML
1 SOLUTION OPHTHALMIC 2 TIMES DAILY
Start: 2022-09-21 | End: 2023-03-08 | Stop reason: SDUPTHER

## 2022-09-21 RX ORDER — DIPHENOXYLATE HYDROCHLORIDE AND ATROPINE SULFATE 2.5; .025 MG/1; MG/1
1 TABLET ORAL 2 TIMES DAILY PRN
Qty: 12 TABLET | Refills: 0 | Status: SHIPPED | OUTPATIENT
Start: 2022-09-21 | End: 2022-10-01

## 2022-09-21 RX ADMIN — IBUPROFEN 400 MG: 400 TABLET, FILM COATED ORAL at 01:09

## 2022-09-21 RX ADMIN — CARVEDILOL 6.25 MG: 3.12 TABLET, FILM COATED ORAL at 09:09

## 2022-09-21 RX ADMIN — MYCOPHENOLIC ACID 180 MG: 180 TABLET, DELAYED RELEASE ORAL at 09:09

## 2022-09-21 RX ADMIN — FAMOTIDINE 20 MG: 20 TABLET ORAL at 09:09

## 2022-09-21 RX ADMIN — TACROLIMUS 1.5 MG: 0.5 CAPSULE ORAL at 09:09

## 2022-09-21 RX ADMIN — LACTOBACILLUS ACIDOPHILUS / LACTOBACILLUS BULGARICUS 1 EACH: 100 MILLION CFU STRENGTH GRANULES at 09:09

## 2022-09-21 RX ADMIN — REMDESIVIR 100 MG: 100 INJECTION, POWDER, LYOPHILIZED, FOR SOLUTION INTRAVENOUS at 09:09

## 2022-09-21 RX ADMIN — DIPHENOXYLATE HYDROCHLORIDE AND ATROPINE SULFATE 1 TABLET: 2.5; .025 TABLET ORAL at 09:09

## 2022-09-21 RX ADMIN — CLINDAMYCIN IN 5 PERCENT DEXTROSE 600 MG: 12 INJECTION, SOLUTION INTRAVENOUS at 09:09

## 2022-09-21 RX ADMIN — LEVOTHYROXINE SODIUM 88 MCG: 88 TABLET ORAL at 05:09

## 2022-09-21 RX ADMIN — CLINDAMYCIN IN 5 PERCENT DEXTROSE 600 MG: 12 INJECTION, SOLUTION INTRAVENOUS at 05:09

## 2022-09-21 RX ADMIN — NIFEDIPINE 30 MG: 30 TABLET, FILM COATED, EXTENDED RELEASE ORAL at 09:09

## 2022-09-21 RX ADMIN — CLINDAMYCIN IN 5 PERCENT DEXTROSE 600 MG: 12 INJECTION, SOLUTION INTRAVENOUS at 01:09

## 2022-09-21 RX ADMIN — TACROLIMUS 1.5 MG: 0.5 CAPSULE ORAL at 06:09

## 2022-09-21 NOTE — HOSPITAL COURSE
9/21/22 Claudette Louise Voss is a 67 y.o. female with hx of kidney transplant admitted with covid- 19, RLL pneumonia, GI symptoms of N/V/D. Continues with f/o diarrhea this am  Day 2 clindamycin for possible aspiration pneumonia( after receiving Rocephin and zithromax in ED)   Day 2 Remdesevir for Covid 19 ;  O2 sat 94-95% on RA   Tmax 100.3,   Nausea was better this am , still feeling a little weak. Not requiring O2    Will d/c home       9/22 pt here with covid, RLL pneumonia, N/V and diarrhea. Was given rocephin and azithromycin in ER and changed to clinda on admission due to poss aspiration. She is on day 3 of clinda as well as day 3 of remdesivir for covid. She had a fever around lunch time yesterday so was held for another night. Last night had diarrhea- lomotil given as well as some mild hypotension 98/54 after getting up and feeling dizzy. She was given 500ml NS bolus overnight. Bp 120/62 this am. POX 96% on RA. She says she is feeling good and would like to go home.

## 2022-09-21 NOTE — ASSESSMENT & PLAN NOTE
Patient is identified as High risk for severe complications of COVID 19 based on COVID risk score of 3   Initiate standard COVID protocols; COVID-19 testing ,Infection Control notification  and isolation- respiratory, contact and droplet per protocol    Diagnostics: (leukopenia, hyponatremia, hyperferritinemia, elevated troponin, elevated d-dimer, age, and comorbidities are significant predictors of poor clinical outcome)  CBC, CMP and Portable CXR  Will need remdesivir - day 2   Remdesivir, an antiviral drug used to treat COVID-19, increased the likelihood of clinical improvement in COVID-19 patients on low-flow oxygen or no oxygen, according to a new study authored by the [facility] [employer], the [facility] [employer], [facility], and [facility].

## 2022-09-21 NOTE — NURSING
Ibuprofen 400 MG given for temp of 100.4. will continue to monitor. Will recheck temp and discuss with MD and see if proceed with discharge.

## 2022-09-21 NOTE — PROGRESS NOTES
[facility] ([address] [place] [address] Medicine  Progress Note    Patient Name: Claudette Louise Voss  MRN: [id number]  Patient Class: IP- Inpatient   Admission Date: 9/19/2022  Length of Stay: 1 days  Attending Physician: Kathy Nickerson MD  Primary Care Provider: Gera Arora MD        Subjective:     Principal Problem:Aspiration pneumonia of right lower lobe        HPI:  Claudette Louise Voss is a 67 y.o. female  with history of prior kidney transplant, CKD, recent COVID diagnosis, presenting with nausea and vomiting.   She started with fever two days ago;  covid positive ; she was positive for covid at home   She has fevers ;chills ;nauseated ; vomiting .   Patient vomited 2 times today.  Denies abdominal pain.  No dysuria or back pain.  No diarrhea.  No chest pain.  Patient denies fever.  Attempted to take a Phenergan tablet, but vomited it up.  No other complaints. Work up in ER showed R basilar opacity consistent with pneumonia . She has been placed on IV Rocephin and IV zithromax .    She is placed in observation for pneumonia and COVID treatment .           Overview/Hospital Course:  9/21/22 Claudette Louise Voss is a 67 y.o. female with hx of kidney transplant admitted with covid- 19, RLL pneumonia, GI symptoms of N/V/D. Continues with f/o diarrhea this am  Day 2 clindamycin for possible aspiration pneumonia( after receiving Rocephin and zithromax in ED)   Day 2 Remdesevir for Covid 19 ;  O2 sat 94-95% on RA   Tmax 100.3,   Nausea was better this am , still feeling a little weak. Not requiring O2    Will d/c home             Past Medical History:   Diagnosis Date    Breast cancer - 2004 11/29/2013    Chronic immunosuppression with Prograf and MMF 1/16/2015    CKD (chronic kidney disease) stage 3, GFR 30-59 ml/min     Colon polyps 11/29/2013    ESRD (end stage renal disease) 11/29/2013    GERD (gastroesophageal reflux disease) 11/29/2013    Glaucoma 11/29/2013    Hyperlipidemia 11/29/2013     Hypertension 11/29/2013    Hypothyroidism 11/29/2013    Malignant neoplasm of upper-outer quadrant of left female breast 8/25/2004    PKD (polycystic kidney disease) - S/P nephrectomies 11/29/2013    Renal hypertension        Past Surgical History:   Procedure Laterality Date    AV FISTULA PLACEMENT      multiple access     BREAST LUMPECTOMY  2004    CHOLECYSTECTOMY  1987    open    COLONOSCOPY      HERNIA REPAIR  2012    KIDNEY TRANSPLANT      LYMPH NODE DISSECTION  2004    with breast cancer treatment    NEPHRECTOMY  2012    bilateral    PARATHYROIDECTOMY Bilateral 12/23/2021    Procedure: PARATHYROIDECTOMY;  Surgeon: Gera Frank MD;  [facility] [facility] [place] [address] [place] [address];  Service: ENT;  Laterality: Bilateral;    SPLENECTOMY, TOTAL  2012       Review of patient's allergies indicates:   Allergen Reactions    Betamethasone acet,sod phos      Other reaction(s): Propensity to adverse reactions to drug    Cayenne Other (See Comments)    Acetaminophen Rash    Aspirin Rash    Vancomycin Rash    Yeast, dried Rash       No current facility-administered medications on file prior to encounter.     Current Outpatient Medications on File Prior to Encounter   Medication Sig    artificial tears,hypromellose, (SYSTANE GEL) 0.3 % Gel Apply 2 drops to eye 3 (three) times daily as needed.    atorvastatin (LIPITOR) 20 MG tablet Take 20 mg by mouth once daily.    brimonidine 0.15 % OPTH DROP (ALPHAGAN) 0.15 % ophthalmic solution Place 1 drop into the left eye 3 (three) times daily.    carvedilol (COREG) 6.25 MG tablet Take 1 tablet (6.25 mg total) by mouth 2 (two) times daily.    cycloSPORINE-chondroit sulf A 0.1-0.25 % Drop Place 1 drop into both eyes 2 (two) times a day.    fluoxetine (PROZAC) 20 MG capsule Take 1 capsule (20 mg total) by mouth every evening.    latanoprost (XALATAN) 0.005 % ophthalmic solution Place 1 drop into the left eye every evening.    levothyroxine (SYNTHROID) 88 MCG tablet Take 88 mcg  by mouth once daily.    magnesium oxide (MAG-OX) 400 mg (241.3 mg magnesium) tablet Take 400 mg by mouth 2 (two) times daily.    mycophenolate (MYFORTIC) 180 MG TbEC Take 1 tablet (180 mg total) by mouth 2 (two) times daily.    nifedipine 30 MG ORAL TR24 (PROCARDIA-XL) 30 MG (OSM) 24 hr tablet Take 1 tablet (30 mg total) by mouth once daily.    omeprazole (PRILOSEC OTC) 20 MG tablet Take 1 capsule orally 2 times a day.    RESTASIS 0.05 % ophthalmic emulsion     tacrolimus (PROGRAF) 0.5 MG Cap Take 3 capsules (1.5 mg total) by mouth every morning AND 3 capsules (1.5 mg total) every evening.    brimonidine 0.2% (ALPHAGAN) 0.2 % Drop Place 1 drop into the left eye 3 (three) times daily.    ketorolac 0.5% (ACULAR) 0.5 % Drop Place 1 drop into the right eye 2 (two) times daily. (Patient taking differently: Place 1 drop into the right eye 2 (two) times daily. MWF one time each of those days)    latanoprost 0.005 % ophthalmic solution Place 1 drop into the left eye every evening.    vitamin D (VITAMIN D3) 1000 units Tab Take 2,000 Units by mouth once daily.     Family History       Problem Relation (Age of Onset)    Diabetes Mother    Kidney disease Father, Sister    Stroke Mother          Tobacco Use    Smoking status: Never    Smokeless tobacco: Never   Substance and Sexual Activity    Alcohol use: No    Drug use: No    Sexual activity: Not on file     Review of Systems   Constitutional:  Negative for chills, fatigue, fever and unexpected weight change.   HENT:  Negative for congestion, ear pain, sore throat and trouble swallowing.    Eyes:  Negative for pain and visual disturbance.   Respiratory:  Positive for cough. Negative for chest tightness and shortness of breath.    Cardiovascular:  Negative for chest pain, palpitations and leg swelling.   Gastrointestinal:  Negative for abdominal distention, abdominal pain, constipation, diarrhea, nausea and vomiting.   Genitourinary:  Negative for difficulty  urinating, dysuria, flank pain, frequency and hematuria.   Musculoskeletal:  Negative for back pain, gait problem, joint swelling, neck pain and neck stiffness.   Skin:  Negative for rash and wound.   Neurological:  Negative for dizziness, seizures, speech difficulty, light-headedness and headaches.   Objective:     Vital Signs (Most Recent):  Temp: 99.5 °F (37.5 °C) (09/21/22 0923)  Pulse: 75 (09/21/22 0923)  Resp: 20 (09/21/22 0923)  BP: (!) 144/66 (09/21/22 0923)  SpO2: (!) 94 % (09/21/22 0923)   Vital Signs (24h Range):  Temp:  [97.6 °F (36.4 °C)-100.3 °F (37.9 °C)] 99.5 °F (37.5 °C)  Pulse:  [73-82] 75  Resp:  [18-20] 20  SpO2:  [91 %-96 %] 94 %  BP: (100-161)/(49-92) 144/66     Weight: 67.8 kg (149 lb 7.6 oz)  Body mass index is 29.19 kg/m².    Physical Exam  Vitals and nursing note reviewed.   Constitutional:       Appearance: She is well-developed.   HENT:      Head: Normocephalic and atraumatic.   Cardiovascular:      Rate and Rhythm: Normal rate and regular rhythm.      Heart sounds: Normal heart sounds.   Pulmonary:      Effort: Pulmonary effort is normal.      Breath sounds: Normal breath sounds.   Abdominal:      General: Bowel sounds are normal.      Palpations: Abdomen is soft.      Tenderness: There is no abdominal tenderness.   Skin:     General: Skin is warm and dry.   Neurological:      Mental Status: She is alert and oriented to person, place, and time.   Psychiatric:         Behavior: Behavior normal.         Thought Content: Thought content normal.         Judgment: Judgment normal.           Significant Labs: All pertinent labs within the past 24 hours have been reviewed.  ABGs:   Recent Labs   Lab 09/20/22  1016   PH 7.450   PCO2 36   HCO3 25.00   POCSATURATED 96.6   BE 1.20   TOTALHB 12.8   COHB 0.9   METHB 1.1   PO2 76     CBC:   Recent Labs   Lab 09/19/22  2223 09/21/22  0824   WBC 11.52 6.84   HGB 13.0 13.1   HCT 38.5 38.5    215       CMP:   Recent Labs   Lab 09/19/22  3251  09/21/22  0824    139   K 3.6 3.4*    102   CO2 20* 24   * 95   BUN 25* 16   CREATININE 0.8 1.0   CALCIUM 9.3 8.8   PROT 7.3 6.7   ALBUMIN 4.1 3.6   BILITOT 0.6 0.5   ALKPHOS 59 56   AST 23 36   ALT 22 28   ANIONGAP 15 13       Lipase:   Recent Labs   Lab 09/19/22  2223   LIPASE 18       Troponin: No results for input(s): TROPONINI in the last 48 hours.  Urine Studies:   Recent Labs   Lab 09/19/22  2337   COLORU Yellow   APPEARANCEUA Clear   PHUR 6.0   SPECGRAV 1.020   PROTEINUA Negative   GLUCUA Negative   KETONESU Negative   BILIRUBINUA Negative   OCCULTUA Negative   NITRITE Negative   UROBILINOGEN Negative   LEUKOCYTESUR Negative         Significant Imaging: I have reviewed all pertinent imaging results/findings within the past 24 hours.  CXR: I have reviewed all pertinent results/findings within the past 24 hours and my personal findings are:       Monitoring EKG leads are present.  The trachea is unremarkable.  The cardiomediastinal silhouette is within normal limits.  There is no evidence of free air beneath the hemidiaphragms.  No pleural effusion.  There is no evidence of a pneumothorax.  There is no evidence of pneumomediastinum.  There is a right basilar opacity.     There is S-shaped scoliosis.  There are postoperative changes in the right upper abdominal quadrant.     Impression:     Right basilar opacity, suggestive of pneumonia.           Assessment/Plan:      * Aspiration pneumonia of right lower lobe  With vomiting and RLL pneumonia   Aspiration pneumonia likely   DC ceftriaxone and zithromax  Start IV clindamycin day 2   Home on po clinda to complete 10 days           COVID-19  Patient is identified as High risk for severe complications of COVID 19 based on COVID risk score of 3   Initiate standard COVID protocols; COVID-19 testing ,Infection Control notification  and isolation- respiratory, contact and droplet per protocol    Diagnostics: (leukopenia, hyponatremia,  hyperferritinemia, elevated troponin, elevated d-dimer, age, and comorbidities are significant predictors of poor clinical outcome)  CBC, CMP and Portable CXR  Will need remdesivir - day 2   Remdesivir, an antiviral drug used to treat COVID-19, increased the likelihood of clinical improvement in COVID-19 patients on low-flow oxygen or no oxygen, according to a new study authored by the [facility] [employer], the [employer], [facility], and [facility].    Chronic immunosuppression with Prograf and MMF  Resume her home meds        Status post -donor kidney transplant for PKD - 1/16/15  Continue tacrolimus      Hypothyroidism  Resume levothyroxine      Hyperlipidemia  Lab Results   Component Value Date    LDLCALC 64.4 2015             VTE Risk Mitigation (From admission, onward)         Ordered     IP VTE HIGH RISK PATIENT  Once         22 0326     Place sequential compression device  Until discontinued         22 0326                Discharge Planning   SHO:      Code Status: Full Code   Is the patient medically ready for discharge?:     Reason for patient still in hospital (select all that apply): Treatment  Discharge Plan A: Home                  Dandy Lopes MD  Department of Hospital Medicine   [place] [facility] (3rd Fl)

## 2022-09-21 NOTE — ASSESSMENT & PLAN NOTE
With vomiting and RLL pneumonia   Aspiration pneumonia likely   DC ceftriaxone and zithromax  Start IV clindamycin day 2   Home on po clinda to complete 10 days

## 2022-09-21 NOTE — SUBJECTIVE & OBJECTIVE
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 breast 8/25/2004    PKD (polycystic kidney disease) - S/P nephrectomies 11/29/2013    Renal hypertension        Past Surgical History:   Procedure Laterality Date    AV FISTULA PLACEMENT      multiple access     BREAST LUMPECTOMY  2004    CHOLECYSTECTOMY  1987    open    COLONOSCOPY      HERNIA REPAIR  2012    KIDNEY TRANSPLANT      LYMPH NODE DISSECTION  2004    with breast cancer treatment    NEPHRECTOMY  2012    bilateral    PARATHYROIDECTOMY Bilateral 12/23/2021    Procedure: PARATHYROIDECTOMY;  Surgeon: Gera Frank MD;  Location: [facility] OR [address] [place] [address];  Service: ENT;  Laterality: Bilateral;    SPLENECTOMY, TOTAL  2012       Review of patient's allergies indicates:   Allergen Reactions    Betamethasone acet,sod phos      Other reaction(s): Propensity to adverse reactions to drug    Cayenne Other (See Comments)    Acetaminophen Rash    Aspirin Rash    Vancomycin Rash    Yeast, dried Rash       No current facility-administered medications on file prior to encounter.     Current Outpatient Medications on File Prior to Encounter   Medication Sig    artificial tears,hypromellose, (SYSTANE GEL) 0.3 % Gel Apply 2 drops to eye 3 (three) times daily as needed.    atorvastatin (LIPITOR) 20 MG tablet Take 20 mg by mouth once daily.    brimonidine 0.15 % OPTH DROP (ALPHAGAN) 0.15 % ophthalmic solution Place 1 drop into the left eye 3 (three) times daily.    carvedilol (COREG) 6.25 MG tablet Take 1 tablet (6.25 mg total) by mouth 2 (two) times  daily.    cycloSPORINE-chondroit sulf A 0.1-0.25 % Drop Place 1 drop into both eyes 2 (two) times a day.    fluoxetine (PROZAC) 20 MG capsule Take 1 capsule (20 mg total) by mouth every evening.    latanoprost (XALATAN) 0.005 % ophthalmic solution Place 1 drop into the left eye every evening.    levothyroxine (SYNTHROID) 88 MCG tablet Take 88 mcg by mouth once daily.    magnesium oxide (MAG-OX) 400 mg (241.3 mg magnesium) tablet Take 400 mg by mouth 2 (two) times daily.    mycophenolate (MYFORTIC) 180 MG TbEC Take 1 tablet (180 mg total) by mouth 2 (two) times daily.    nifedipine 30 MG ORAL TR24 (PROCARDIA-XL) 30 MG (OSM) 24 hr tablet Take 1 tablet (30 mg total) by mouth once daily.    omeprazole (PRILOSEC OTC) 20 MG tablet Take 1 capsule orally 2 times a day.    RESTASIS 0.05 % ophthalmic emulsion     tacrolimus (PROGRAF) 0.5 MG Cap Take 3 capsules (1.5 mg total) by mouth every morning AND 3 capsules (1.5 mg total) every evening.    brimonidine 0.2% (ALPHAGAN) 0.2 % Drop Place 1 drop into the left eye 3 (three) times daily.    ketorolac 0.5% (ACULAR) 0.5 % Drop Place 1 drop into the right eye 2 (two) times daily. (Patient taking differently: Place 1 drop into the right eye 2 (two) times daily. MWF one time each of those days)    latanoprost 0.005 % ophthalmic solution Place 1 drop into the left eye every evening.    vitamin D (VITAMIN D3) 1000 units Tab Take 2,000 Units by mouth once daily.     Family History       Problem Relation (Age of Onset)    Diabetes Mother    Kidney disease Father, Sister    Stroke Mother          Tobacco Use    Smoking status: Never    Smokeless tobacco: Never   Substance and Sexual Activity    Alcohol use: No    Drug use: No    Sexual activity: Not on file     Review of Systems   Constitutional:  Negative for chills, fatigue, fever and unexpected weight change.   HENT:  Negative for congestion, ear pain, sore throat and trouble swallowing.    Eyes:  Negative for pain and visual  disturbance.   Respiratory:  Positive for cough. Negative for chest tightness and shortness of breath.    Cardiovascular:  Negative for chest pain, palpitations and leg swelling.   Gastrointestinal:  Negative for abdominal distention, abdominal pain, constipation, diarrhea, nausea and vomiting.   Genitourinary:  Negative for difficulty urinating, dysuria, flank pain, frequency and hematuria.   Musculoskeletal:  Negative for back pain, gait problem, joint swelling, neck pain and neck stiffness.   Skin:  Negative for rash and wound.   Neurological:  Negative for dizziness, seizures, speech difficulty, light-headedness and headaches.   Objective:     Vital Signs (Most Recent):  Temp: 99.5 °F (37.5 °C) (09/21/22 0923)  Pulse: 75 (09/21/22 0923)  Resp: 20 (09/21/22 0923)  BP: (!) 144/66 (09/21/22 0923)  SpO2: (!) 94 % (09/21/22 0923)   Vital Signs (24h Range):  Temp:  [97.6 °F (36.4 °C)-100.3 °F (37.9 °C)] 99.5 °F (37.5 °C)  Pulse:  [73-82] 75  Resp:  [18-20] 20  SpO2:  [91 %-96 %] 94 %  BP: (100-161)/(49-92) 144/66     Weight: 67.8 kg (149 lb 7.6 oz)  Body mass index is 29.19 kg/m².    Physical Exam  Vitals and nursing note reviewed.   Constitutional:       Appearance: She is well-developed.   HENT:      Head: Normocephalic and atraumatic.   Cardiovascular:      Rate and Rhythm: Normal rate and regular rhythm.      Heart sounds: Normal heart sounds.   Pulmonary:      Effort: Pulmonary effort is normal.      Breath sounds: Normal breath sounds.   Abdominal:      General: Bowel sounds are normal.      Palpations: Abdomen is soft.      Tenderness: There is no abdominal tenderness.   Skin:     General: Skin is warm and dry.   Neurological:      Mental Status: She is alert and oriented to person, place, and time.   Psychiatric:         Behavior: Behavior normal.         Thought Content: Thought content normal.         Judgment: Judgment normal.           Significant Labs: All pertinent labs within the past 24 hours have been  reviewed.  ABGs:   Recent Labs   Lab 09/20/22  1016   PH 7.450   PCO2 36   HCO3 25.00   POCSATURATED 96.6   BE 1.20   TOTALHB 12.8   COHB 0.9   METHB 1.1   PO2 76     CBC:   Recent Labs   Lab 09/19/22 2223 09/21/22  0824   WBC 11.52 6.84   HGB 13.0 13.1   HCT 38.5 38.5    215       CMP:   Recent Labs   Lab 09/19/22 2223 09/21/22  0824    139   K 3.6 3.4*    102   CO2 20* 24   * 95   BUN 25* 16   CREATININE 0.8 1.0   CALCIUM 9.3 8.8   PROT 7.3 6.7   ALBUMIN 4.1 3.6   BILITOT 0.6 0.5   ALKPHOS 59 56   AST 23 36   ALT 22 28   ANIONGAP 15 13       Lipase:   Recent Labs   Lab 09/19/22  2223   LIPASE 18       Troponin: No results for input(s): TROPONINI in the last 48 hours.  Urine Studies:   Recent Labs   Lab 09/19/22  2337   COLORU Yellow   APPEARANCEUA Clear   PHUR 6.0   SPECGRAV 1.020   PROTEINUA Negative   GLUCUA Negative   KETONESU Negative   BILIRUBINUA Negative   OCCULTUA Negative   NITRITE Negative   UROBILINOGEN Negative   LEUKOCYTESUR Negative         Significant Imaging: I have reviewed all pertinent imaging results/findings within the past 24 hours.  CXR: I have reviewed all pertinent results/findings within the past 24 hours and my personal findings are:       Monitoring EKG leads are present.  The trachea is unremarkable.  The cardiomediastinal silhouette is within normal limits.  There is no evidence of free air beneath the hemidiaphragms.  No pleural effusion.  There is no evidence of a pneumothorax.  There is no evidence of pneumomediastinum.  There is a right basilar opacity.     There is S-shaped scoliosis.  There are postoperative changes in the right upper abdominal quadrant.     Impression:     Right basilar opacity, suggestive of pneumonia.

## 2022-09-21 NOTE — DISCHARGE SUMMARY
[facility] Surg ([address] [place] [address] Medicine  Discharge Summary      Patient Name: Claudette Louise Voss  MRN: [id number]  Patient Class: IP- Inpatient  Admission Date: 9/19/2022  Hospital Length of Stay: 1 days  Discharge Date and Time:  09/21/2022 12:01 PM  Attending Physician: Kathy Nickerson MD   Discharging Provider: Yuliana Salamanca NP  Primary Care Provider: Gera Arora MD      HPI:   Claudette Louise Voss is a 67 y.o. female  with history of prior kidney transplant, CKD, recent COVID diagnosis, presenting with nausea and vomiting.   She started with fever two days ago;  covid positive ; she was positive for covid at home   She has fevers ;chills ;nauseated ; vomiting .   Patient vomited 2 times today.  Denies abdominal pain.  No dysuria or back pain.  No diarrhea.  No chest pain.  Patient denies fever.  Attempted to take a Phenergan tablet, but vomited it up.  No other complaints. Work up in ER showed R basilar opacity consistent with pneumonia . She has been placed on IV Rocephin and IV zithromax .    She is placed in observation for pneumonia and COVID treatment .           * No surgery found *      Hospital Course:   9/21/22 Claudette Louise Voss is a 67 y.o. female with hx of kidney transplant admitted with covid- 19, RLL pneumonia, GI symptoms of N/V/D. Continues with f/o diarrhea this am  Day 2 clindamycin for possible aspiration pneumonia( after receiving Rocephin and zithromax in ED)   Day 2 Remdesevir for Covid 19 ;  O2 sat 94-95% on RA   Tmax 100.3,   Nausea was better this am , still feeling a little weak. Not requiring O2    Will d/c home              Goals of Care Treatment Preferences:  Code Status: Full Code      Consults:     * Aspiration pneumonia of right lower lobe  With vomiting and RLL pneumonia   Aspiration pneumonia likely   DC ceftriaxone and zithromax  Start IV clindamycin day 2   Home on po clinda to complete 10 days           COVID-19  Patient is identified as High  risk for severe complications of COVID 19 based on COVID risk score of 3   Initiate standard COVID protocols; COVID-19 testing ,Infection Control notification  and isolation- respiratory, contact and droplet per protocol    Diagnostics: (leukopenia, hyponatremia, hyperferritinemia, elevated troponin, elevated d-dimer, age, and comorbidities are significant predictors of poor clinical outcome)  CBC, CMP and Portable CXR  Will need remdesivir - day 2   Remdesivir, an antiviral drug used to treat COVID-19, increased the likelihood of clinical improvement in COVID-19 patients on low-flow oxygen or no oxygen, according to a new study authored by the [facility] [employer], the [facility] [employer] [facility], [facility], and [facility].    Chronic immunosuppression with Prograf and MMF  Resume her home meds        Status post -donor kidney transplant for PKD - 1/16/15  Continue tacrolimus      Hypothyroidism  Resume levothyroxine      Hyperlipidemia  Lab Results   Component Value Date    LDLCALC 64.4 2015             Final Active Diagnoses:    Diagnosis Date Noted POA    PRINCIPAL PROBLEM:  Aspiration pneumonia of right lower lobe [J69.0] 2022 Yes    COVID-19 [U07.1] 2022 Unknown    Chronic immunosuppression with Prograf and MMF [Z29.8] 2015 Not Applicable     Chronic    Status post -donor kidney transplant for PKD - 1/16/15 [Z94.0] 2015 Not Applicable     Chronic    Hyperlipidemia [E78.5] 2013 Yes    Hypothyroidism [E03.9] 2013 Yes      Problems Resolved During this Admission:       Discharged Condition: stable    Disposition: Home or Self Care    Follow Up:   Follow-up Information     Adina Kang PA-C. Go on 2022.    Specialty: Emergency Medicine  Why: Hospital Follow-up at 9:15am  Contact information:  [place] [address] [phone]                       Patient Instructions:      Ambulatory  referral/consult to Outpatient Case Management   Referral Priority: Routine Referral Type: Consultation   Referral Reason: Specialty Services Required   Number of Visits Requested: 1     COVID-19 Surveillance Program     Order Specific Question Answer Comments   Does patient have a smartphone? No    Does patient have the [facility] on their smartphone? No    While in surveillance program, will patient be using home oxygen? No        Significant Diagnostic Studies:       Pending Diagnostic Studies:     None         Medications:  Reconciled Home Medications:      Medication List      START taking these medications    clindamycin 300 MG capsule  Commonly known as: CLEOCIN  Take 1 capsule (300 mg total) by mouth 3 (three) times daily. for 8 days     diphenoxylate-atropine 2.5-0.025 mg 2.5-0.025 mg per tablet  Commonly known as: LOMOTIL  Take 1 tablet by mouth 2 (two) times daily as needed for Diarrhea.     lactobacillus acidophilus & bulgar 100 million cell packet  Commonly known as: LACTINEX  Take 1 packet (1 each total) by mouth 2 (two) times daily.     pulse oximeter device  Commonly known as: pulse oximeter  by Apply Externally route 2 (two) times a day. Use twice daily at 8 AM and 3 PM and record the value in [email or web address] as directed.        CHANGE how you take these medications    brimonidine 0.15 % OPTH DROP 0.15 % ophthalmic solution  Commonly known as: ALPHAGAN  Place 1 drop into the left eye 3 (three) times daily.  What changed: Another medication with the same name was removed. Continue taking this medication, and follow the directions you see here.     latanoprost 0.005 % ophthalmic solution  Commonly known as: XALATAN  Place 1 drop into the left eye every evening.  What changed: Another medication with the same name was removed. Continue taking this medication, and follow the directions you see here.        CONTINUE taking these medications    artificial tears(hypromellose)(GENTEAL/SUSTANE) 0.3 %  Gel  Commonly known as: Systane GeL  Apply 2 drops to eye 3 (three) times daily as needed.     atorvastatin 20 MG tablet  Commonly known as: LIPITOR  Take 20 mg by mouth once daily.     carvediloL 6.25 MG tablet  Commonly known as: COREG  Take 1 tablet (6.25 mg total) by mouth 2 (two) times daily.     cycloSPORINE-chondroit sulf A 0.1-0.25 % Drop  Place 1 drop into both eyes 2 (two) times a day.     FLUoxetine 20 MG capsule  Commonly known as: PROzac  Take 1 capsule (20 mg total) by mouth every evening.     ketorolac 0.5% 0.5 % Drop  Commonly known as: ACULAR  Place 1 drop into the right eye 2 (two) times daily. MWF one time each of those days     levothyroxine 88 MCG tablet  Commonly known as: SYNTHROID  Take 88 mcg by mouth once daily.     magnesium oxide 400 mg (241.3 mg magnesium) tablet  Commonly known as: MAG-OX  Take 400 mg by mouth 2 (two) times daily.     mycophenolate 180 MG Tbec  Commonly known as: MYFORTIC  Take 1 tablet (180 mg total) by mouth 2 (two) times daily.     NIFEdipine 30 MG (OSM) 24 hr tablet  Commonly known as: PROCARDIA-XL  Take 1 tablet (30 mg total) by mouth once daily.     omeprazole 20 MG tablet  Commonly known as: PRILOSEC OTC  Take 1 capsule orally 2 times a day.     RESTASIS 0.05 % ophthalmic emulsion  Generic drug: cycloSPORINE     tacrolimus 0.5 MG Cap  Commonly known as: PROGRAF  Take 3 capsules (1.5 mg total) by mouth every morning AND 3 capsules (1.5 mg total) every evening.     vitamin D 1000 units Tab  Commonly known as: VITAMIN D3  Take 2,000 Units by mouth once daily.            Indwelling Lines/Drains at time of discharge:   Lines/Drains/Airways     Drain  Duration                Hemodialysis AV Fistula -- days                Time spent on the discharge of patient: 25 minutes         Yuliana Salamanca NP  Department of Hospital Medicine  [place] [facility] (3rd Fl)

## 2022-09-21 NOTE — ASSESSMENT & PLAN NOTE
Patient is identified as High risk for severe complications of COVID 19 based on COVID risk score of 3   Initiate standard COVID protocols; COVID-19 testing ,Infection Control notification  and isolation- respiratory, contact and droplet per protocol    Diagnostics: (leukopenia, hyponatremia, hyperferritinemia, elevated troponin, elevated d-dimer, age, and comorbidities are significant predictors of poor clinical outcome)  CBC, CMP and Portable CXR  Will need remdesivir - day 2   Remdesivir, an antiviral drug used to treat COVID-19, increased the likelihood of clinical improvement in COVID-19 patients on low-flow oxygen or no oxygen, according to a new study authored by the [facility] [employer] [facility], the [facility] [employer] [facility], [facility], and [facility].

## 2022-09-21 NOTE — NURSING
Patient reports she is able to take Tylenol 500 MG as needed at home for pain or temperature according to transplant doctor's. Patient reports rash issues with tylenol. Patient should minimize use of ibuprofen; med discontinued at this time.

## 2022-09-21 NOTE — PLAN OF CARE
[place] [facility] - Med Surg (3rd Fl)  Discharge Assessment    Primary Care Provider: Gera Arora MD     Discharge Assessment (most recent)       BRIEF DISCHARGE ASSESSMENT - 09/21/22 1113          Discharge Planning    Assessment Type Discharge Planning Brief Assessment     Resource/Environmental Concerns none     Support Systems Spouse/significant other     Assistance Needed None     Equipment Currently Used at Home none     Current Living Arrangements home/apartment/condo     Patient/Family Anticipates Transition to home     Patient/Family Anticipated Services at Transition none     DME Needed Upon Discharge  none     Discharge Plan A Home     Discharge Plan B Home with family                       Discharge assessment completed with patient over the phone to maintain COVID precautions. Patient denies any post-acute care needs at this time to include DME or home health. SW to remain available.

## 2022-09-21 NOTE — PLAN OF CARE
09/21/22 1151   Medicare Message   Important Message from Medicare regarding Discharge Appeal Rights Given to patient/caregiver;Explained to patient/caregiver;Signed/date by patient/caregiver   Date IMM was signed 09/22/22   Time IMM was signed 0900     Ms Cinthia will discharge to her home with home antibiotics for 10 days. CM discussed signs and symptoms requiring a return to the ED. She denies any post acute care needs.

## 2022-09-22 VITALS
BODY MASS INDEX: 29.35 KG/M2 | SYSTOLIC BLOOD PRESSURE: 110 MMHG | HEIGHT: 60 IN | TEMPERATURE: 100 F | OXYGEN SATURATION: 95 % | DIASTOLIC BLOOD PRESSURE: 64 MMHG | WEIGHT: 149.5 LBS | RESPIRATION RATE: 18 BRPM | HEART RATE: 62 BPM

## 2022-09-22 PROCEDURE — 25000003 PHARM REV CODE 250: Performed by: NURSE PRACTITIONER

## 2022-09-22 PROCEDURE — 25000003 PHARM REV CODE 250: Performed by: FAMILY MEDICINE

## 2022-09-22 PROCEDURE — 97161 PT EVAL LOW COMPLEX 20 MIN: CPT

## 2022-09-22 PROCEDURE — 63600175 PHARM REV CODE 636 W HCPCS: Performed by: INTERNAL MEDICINE

## 2022-09-22 PROCEDURE — 63600175 PHARM REV CODE 636 W HCPCS: Performed by: SURGERY

## 2022-09-22 PROCEDURE — 25000003 PHARM REV CODE 250: Performed by: INTERNAL MEDICINE

## 2022-09-22 PROCEDURE — 25000003 PHARM REV CODE 250: Performed by: SURGERY

## 2022-09-22 PROCEDURE — 25000003 PHARM REV CODE 250: Performed by: STUDENT IN AN ORGANIZED HEALTH CARE EDUCATION/TRAINING PROGRAM

## 2022-09-22 PROCEDURE — 99238 PR HOSPITAL DISCHARGE DAY,<30 MIN: ICD-10-PCS | Mod: ,,, | Performed by: FAMILY MEDICINE

## 2022-09-22 PROCEDURE — 99238 HOSP IP/OBS DSCHRG MGMT 30/<: CPT | Mod: ,,, | Performed by: FAMILY MEDICINE

## 2022-09-22 PROCEDURE — 94760 N-INVAS EAR/PLS OXIMETRY 1: CPT

## 2022-09-22 RX ORDER — POTASSIUM CHLORIDE 20 MEQ/1
40 TABLET, EXTENDED RELEASE ORAL ONCE
Status: COMPLETED | OUTPATIENT
Start: 2022-09-22 | End: 2022-09-22

## 2022-09-22 RX ORDER — LOPERAMIDE HYDROCHLORIDE 2 MG/1
2 CAPSULE ORAL 4 TIMES DAILY PRN
Status: DISCONTINUED | OUTPATIENT
Start: 2022-09-22 | End: 2022-09-22 | Stop reason: HOSPADM

## 2022-09-22 RX ADMIN — SODIUM CHLORIDE 500 ML: 0.9 INJECTION, SOLUTION INTRAVENOUS at 03:09

## 2022-09-22 RX ADMIN — REMDESIVIR 100 MG: 100 INJECTION, POWDER, LYOPHILIZED, FOR SOLUTION INTRAVENOUS at 09:09

## 2022-09-22 RX ADMIN — LOPERAMIDE HYDROCHLORIDE 2 MG: 2 CAPSULE ORAL at 03:09

## 2022-09-22 RX ADMIN — POTASSIUM CHLORIDE 40 MEQ: 1500 TABLET, EXTENDED RELEASE ORAL at 01:09

## 2022-09-22 RX ADMIN — CLINDAMYCIN IN 5 PERCENT DEXTROSE 600 MG: 12 INJECTION, SOLUTION INTRAVENOUS at 01:09

## 2022-09-22 RX ADMIN — FAMOTIDINE 20 MG: 20 TABLET ORAL at 09:09

## 2022-09-22 RX ADMIN — TACROLIMUS 1.5 MG: 0.5 CAPSULE ORAL at 09:09

## 2022-09-22 RX ADMIN — LEVOTHYROXINE SODIUM 88 MCG: 88 TABLET ORAL at 05:09

## 2022-09-22 RX ADMIN — MYCOPHENOLIC ACID 180 MG: 180 TABLET, DELAYED RELEASE ORAL at 09:09

## 2022-09-22 RX ADMIN — CLINDAMYCIN IN 5 PERCENT DEXTROSE 600 MG: 12 INJECTION, SOLUTION INTRAVENOUS at 05:09

## 2022-09-22 RX ADMIN — NIFEDIPINE 30 MG: 30 TABLET, FILM COATED, EXTENDED RELEASE ORAL at 09:09

## 2022-09-22 RX ADMIN — CARVEDILOL 6.25 MG: 3.12 TABLET, FILM COATED ORAL at 09:09

## 2022-09-22 RX ADMIN — LACTOBACILLUS ACIDOPHILUS / LACTOBACILLUS BULGARICUS 1 EACH: 100 MILLION CFU STRENGTH GRANULES at 09:09

## 2022-09-22 NOTE — SUBJECTIVE & OBJECTIVE
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 breast 8/25/2004    PKD (polycystic kidney disease) - S/P nephrectomies 11/29/2013    Renal hypertension        Past Surgical History:   Procedure Laterality Date    AV FISTULA PLACEMENT      multiple access     BREAST LUMPECTOMY  2004    CHOLECYSTECTOMY  1987    open    COLONOSCOPY      HERNIA REPAIR  2012    KIDNEY TRANSPLANT      LYMPH NODE DISSECTION  2004    with breast cancer treatment    NEPHRECTOMY  2012    bilateral    PARATHYROIDECTOMY Bilateral 12/23/2021    Procedure: PARATHYROIDECTOMY;  Surgeon: Gera Frank MD;  Location: [facility] OR [address] [place] [address];  Service: ENT;  Laterality: Bilateral;    SPLENECTOMY, TOTAL  2012       Review of patient's allergies indicates:   Allergen Reactions    Betamethasone acet,sod phos      Other reaction(s): Propensity to adverse reactions to drug    Cayenne Other (See Comments)    Acetaminophen Rash    Aspirin Rash    Vancomycin Rash    Yeast, dried Rash       No current facility-administered medications on file prior to encounter.     Current Outpatient Medications on File Prior to Encounter   Medication Sig    artificial tears,hypromellose, (SYSTANE GEL) 0.3 % Gel Apply 2 drops to eye 3 (three) times daily as needed.    atorvastatin (LIPITOR) 20 MG tablet Take 20 mg by mouth once daily.    brimonidine 0.15 % OPTH DROP (ALPHAGAN) 0.15 % ophthalmic solution Place 1 drop into the left eye 3 (three) times daily.    carvedilol (COREG) 6.25 MG tablet Take 1 tablet (6.25 mg total) by mouth 2 (two) times  daily.    cycloSPORINE-chondroit sulf A 0.1-0.25 % Drop Place 1 drop into both eyes 2 (two) times a day.    fluoxetine (PROZAC) 20 MG capsule Take 1 capsule (20 mg total) by mouth every evening.    latanoprost (XALATAN) 0.005 % ophthalmic solution Place 1 drop into the left eye every evening.    levothyroxine (SYNTHROID) 88 MCG tablet Take 88 mcg by mouth once daily.    magnesium oxide (MAG-OX) 400 mg (241.3 mg magnesium) tablet Take 400 mg by mouth 2 (two) times daily.    mycophenolate (MYFORTIC) 180 MG TbEC Take 1 tablet (180 mg total) by mouth 2 (two) times daily.    nifedipine 30 MG ORAL TR24 (PROCARDIA-XL) 30 MG (OSM) 24 hr tablet Take 1 tablet (30 mg total) by mouth once daily.    omeprazole (PRILOSEC OTC) 20 MG tablet Take 1 capsule orally 2 times a day.    RESTASIS 0.05 % ophthalmic emulsion     tacrolimus (PROGRAF) 0.5 MG Cap Take 3 capsules (1.5 mg total) by mouth every morning AND 3 capsules (1.5 mg total) every evening.    vitamin D (VITAMIN D3) 1000 units Tab Take 2,000 Units by mouth once daily.     Family History       Problem Relation (Age of Onset)    Diabetes Mother    Kidney disease Father, Sister    Stroke Mother          Tobacco Use    Smoking status: Never    Smokeless tobacco: Never   Substance and Sexual Activity    Alcohol use: No    Drug use: No    Sexual activity: Not on file     Review of Systems   Constitutional:  Negative for chills, fatigue, fever and unexpected weight change.   HENT:  Negative for congestion, ear pain, sore throat and trouble swallowing.    Eyes:  Negative for pain and visual disturbance.   Respiratory:  Positive for cough. Negative for chest tightness and shortness of breath.    Cardiovascular:  Negative for chest pain, palpitations and leg swelling.   Gastrointestinal:  Negative for abdominal distention, abdominal pain, constipation, diarrhea, nausea and vomiting.   Genitourinary:  Negative for difficulty urinating, dysuria, flank pain, frequency and hematuria.    Musculoskeletal:  Negative for back pain, gait problem, joint swelling, neck pain and neck stiffness.   Skin:  Negative for rash and wound.   Neurological:  Negative for dizziness, seizures, speech difficulty, light-headedness and headaches.   Objective:     Vital Signs (Most Recent):  Temp: 98.6 °F (37 °C) (09/22/22 0750)  Pulse: 64 (09/22/22 0750)  Resp: 18 (09/22/22 0750)  BP: 120/62 (09/22/22 0750)  SpO2: 96 % (09/22/22 0750)   Vital Signs (24h Range):  Temp:  [97.1 °F (36.2 °C)-100.4 °F (38 °C)] 98.6 °F (37 °C)  Pulse:  [64-76] 64  Resp:  [18-20] 18  SpO2:  [92 %-98 %] 96 %  BP: ()/(54-75) 120/62     Weight: 67.8 kg (149 lb 7.6 oz)  Body mass index is 29.19 kg/m².    Physical Exam  Vitals and nursing note reviewed.   Constitutional:       Appearance: She is well-developed.   HENT:      Head: Normocephalic and atraumatic.   Cardiovascular:      Rate and Rhythm: Normal rate and regular rhythm.      Heart sounds: Normal heart sounds.   Pulmonary:      Effort: Pulmonary effort is normal.      Breath sounds: Normal breath sounds.   Abdominal:      General: Bowel sounds are normal.      Palpations: Abdomen is soft.      Tenderness: There is no abdominal tenderness.   Skin:     General: Skin is warm and dry.   Neurological:      Mental Status: She is alert and oriented to person, place, and time.   Psychiatric:         Behavior: Behavior normal.         Thought Content: Thought content normal.         Judgment: Judgment normal.           Significant Labs:   ABGs:   Recent Labs   Lab 09/20/22  1016   PH 7.450   PCO2 36   HCO3 25.00   POCSATURATED 96.6   BE 1.20   TOTALHB 12.8   COHB 0.9   METHB 1.1   PO2 76       CBC:   Recent Labs   Lab 09/21/22  0824   WBC 6.84   HGB 13.1   HCT 38.5          CMP:   Recent Labs   Lab 09/21/22  0824      K 3.4*      CO2 24   GLU 95   BUN 16   CREATININE 1.0   CALCIUM 8.8   PROT 6.7   ALBUMIN 3.6   BILITOT 0.5   ALKPHOS 56   AST 36   ALT 28   ANIONGAP 13      9/20 lactic acid 1.3  9*/19 covid positive   Lipase 18  U/a negative     Significant Imaging:        CXR  Right basilar opacity, suggestive of pneumonia.     EKG Sinus rhythm with 1st degree A-V block  Otherwise normal ECG  When compared with ECG of 16-JAN-2015 02:05,  No significant change was found  Confirmed by Alexey Martinez MD ([id number]) on 9/20/2022 10:30:01 AM

## 2022-09-22 NOTE — SUBJECTIVE & OBJECTIVE
Review of Systems   Constitutional:  Positive for fever. Negative for chills, fatigue and unexpected weight change.   HENT:  Negative for congestion, ear pain, sore throat and trouble swallowing.    Eyes:  Negative for pain and visual disturbance.   Respiratory:  Positive for cough. Negative for chest tightness and shortness of breath.    Cardiovascular:  Negative for chest pain, palpitations and leg swelling.   Gastrointestinal:  Positive for diarrhea. Negative for abdominal distention, abdominal pain, constipation, nausea and vomiting.   Genitourinary:  Negative for difficulty urinating, dysuria, flank pain, frequency and hematuria.   Musculoskeletal:  Negative for back pain, gait problem, joint swelling, neck pain and neck stiffness.   Skin:  Negative for rash and wound.   Neurological:  Negative for dizziness, seizures, speech difficulty, light-headedness and headaches.   Objective:     Vital Signs (Most Recent):  Temp: 99.5 °F (37.5 °C) (09/22/22 1113)  Pulse: 62 (09/22/22 1113)  Resp: 18 (09/22/22 1113)  BP: 110/64 (09/22/22 1113)  SpO2: 95 % (09/22/22 1113)   Vital Signs (24h Range):  Temp:  [97.1 °F (36.2 °C)-100.4 °F (38 °C)] 99.5 °F (37.5 °C)  Pulse:  [62-76] 62  Resp:  [18-20] 18  SpO2:  [94 %-98 %] 95 %  BP: ()/(54-64) 110/64     Weight: 67.8 kg (149 lb 7.6 oz)  Body mass index is 29.19 kg/m².    Physical Exam  Vitals and nursing note reviewed.   Constitutional:       Appearance: She is well-developed.   HENT:      Head: Normocephalic and atraumatic.   Cardiovascular:      Rate and Rhythm: Normal rate and regular rhythm.      Heart sounds: Normal heart sounds.   Pulmonary:      Effort: Pulmonary effort is normal.      Breath sounds: Normal breath sounds.   Abdominal:      General: Bowel sounds are normal.      Palpations: Abdomen is soft.      Tenderness: There is no abdominal tenderness.   Skin:     General: Skin is warm and dry.   Neurological:      Mental Status: She is alert and oriented to  person, place, and time.   Psychiatric:         Behavior: Behavior normal.         Thought Content: Thought content normal.         Judgment: Judgment normal.           Significant Labs:   ABGs: No results for input(s): PH, PCO2, HCO3, POCSATURATED, BE, TOTALHB, COHB, METHB, O2HB, POCFIO2, PO2 in the last 48 hours.    CBC:   Recent Labs   Lab 09/21/22  0824   WBC 6.84   HGB 13.1   HCT 38.5          CMP:   Recent Labs   Lab 09/21/22  0824      K 3.4*      CO2 24   GLU 95   BUN 16   CREATININE 1.0   CALCIUM 8.8   PROT 6.7   ALBUMIN 3.6   BILITOT 0.5   ALKPHOS 56   AST 36   ALT 28   ANIONGAP 13     9/20 lactic acid 1.3  9*/19 covid positive   Lipase 18  U/a negative     Significant Imaging:        CXR  Right basilar opacity, suggestive of pneumonia.     EKG Sinus rhythm with 1st degree A-V block  Otherwise normal ECG  When compared with ECG of 16-JAN-2015 02:05,  No significant change was found  Confirmed by Alexey Martinez MD ([id number]) on 9/20/2022 10:30:01 AM

## 2022-09-22 NOTE — DISCHARGE SUMMARY
[facility]urg ([address] [place] [address] Medicine  Discharge Summary      Patient Name: Claudette Louise Voss  MRN: [id number]  Patient Class: IP- Inpatient  Admission Date: 9/19/2022  Hospital Length of Stay: 2 days  Discharge Date and Time:  09/22/2022 11:39 AM  Attending Physician: Kathy Nickerson MD   Discharging Provider: Angela Vera NP  Primary Care Provider: Gera Arora MD      HPI:   Claudette Louise Voss is a 67 y.o. female  with history of prior kidney transplant, CKD, recent COVID diagnosis, presenting with nausea and vomiting.   She started with fever two days ago;  covid positive ; she was positive for covid at home   She has fevers ;chills ;nauseated ; vomiting .   Patient vomited 2 times today.  Denies abdominal pain.  No dysuria or back pain.  No diarrhea.  No chest pain.  Patient denies fever.  Attempted to take a Phenergan tablet, but vomited it up.  No other complaints. Work up in ER showed R basilar opacity consistent with pneumonia . She has been placed on IV Rocephin and IV zithromax .    She is placed in observation for pneumonia and COVID treatment .           * No surgery found *      Hospital Course:   9/21/22 Claudette Louise Voss is a 67 y.o. female with hx of kidney transplant admitted with covid- 19, RLL pneumonia, GI symptoms of N/V/D. Continues with f/o diarrhea this am  Day 2 clindamycin for possible aspiration pneumonia( after receiving Rocephin and zithromax in ED)   Day 2 Remdesevir for Covid 19 ;  O2 sat 94-95% on RA   Tmax 100.3,   Nausea was better this am , still feeling a little weak. Not requiring O2    Will d/c home       9/22 pt here with covid, RLL pneumonia, N/V and diarrhea. Was given rocephin and azithromycin in ER and changed to clinda on admission due to poss aspiration. She is on day 3 of clinda as well as day 3 of remdesivir for covid. She had a fever around lunch time yesterday so was held for another night. Last night had diarrhea- lomotil given as well  as some mild hypotension 98/54 after getting up and feeling dizzy. She was given 500ml NS bolus overnight. Bp 120/62 this am. POX 96% on RA       Goals of Care Treatment Preferences:  Code Status: Full Code      Consults:     * Aspiration pneumonia of right lower lobe  With vomiting and RLL pneumonia   Aspiration pneumonia likely   DC ceftriaxone and zithromax given x 1 in ER  Start IV clindamycin day 3   Home on po clinda to complete 10 days           COVID-19  Patient is identified as High risk for severe complications of COVID 19 based on COVID risk score of 3   Initiate standard COVID protocols; COVID-19 testing ,Infection Control notification  and isolation- respiratory, contact and droplet per protocol    Diagnostics: (leukopenia, hyponatremia, hyperferritinemia, elevated troponin, elevated d-dimer, age, and comorbidities are significant predictors of poor clinical outcome)  CBC, CMP and Portable CXR  Will need remdesivir - day 3  Remdesivir, an antiviral drug used to treat COVID-19, increased the likelihood of clinical improvement in COVID-19 patients on low-flow oxygen or no oxygen, according to a new study authored by the [facility] [employer] [facility], the [employer], [facility], and [facility].    Ambulated well with PT no hypoxia no weakness     Chronic immunosuppression with Prograf and MMF  Resume her home meds        Status post -donor kidney transplant for PKD - 1/16/15  Continue tacrolimus      Hypothyroidism  Resume levothyroxine      Hyperlipidemia  Lab Results   Component Value Date    LDLCALC 64.4 2015             Final Active Diagnoses:    Diagnosis Date Noted POA    PRINCIPAL PROBLEM:  Aspiration pneumonia of right lower lobe [J69.0] 2022 Yes    COVID-19 [U07.1] 2022 Yes    Chronic immunosuppression with Prograf and MMF [Z29.8] 2015 Not Applicable     Chronic    Status post -donor kidney transplant for  PKD - 1/16/15 [Z94.0] 01/16/2015 Not Applicable     Chronic    Hyperlipidemia [E78.5] 11/29/2013 Yes    Hypothyroidism [E03.9] 11/29/2013 Yes      Problems Resolved During this Admission:       Discharged Condition: good    Disposition: Home or Self Care    Follow Up:   Follow-up Information       Adina Kang PA-C. Go on 9/29/2022.    Specialty: Emergency Medicine  Why: Hospital Follow-up at 9:15am  Contact information:  [address] [place] [id number] [phone]                           Patient Instructions:      Ambulatory referral/consult to Outpatient Case Management   Referral Priority: Routine Referral Type: Consultation   Referral Reason: Specialty Services Required   Number of Visits Requested: 1     COVID-19 Surveillance Program     Order Specific Question Answer Comments   Does patient have a smartphone? No    Does patient have the [facility] on their smartphone? No    While in surveillance program, will patient be using home oxygen? No      COVID-19 Surveillance Program     Order Specific Question Answer Comments   Does patient have a smartphone? No    Does patient have the [facility] on their smartphone? No    While in surveillance program, will patient be using home oxygen? No        Significant Diagnostic Studies:     CBC:   Recent Labs   Lab 09/21/22  0824   WBC 6.84   HGB 13.1   HCT 38.5          CMP:   Recent Labs   Lab 09/21/22  0824      K 3.4*      CO2 24   GLU 95   BUN 16   CREATININE 1.0   CALCIUM 8.8   PROT 6.7   ALBUMIN 3.6   BILITOT 0.5   ALKPHOS 56   AST 36   ALT 28   ANIONGAP 13     9/20 lactic acid 1.3  9*/19 covid positive   Lipase 18  U/a negative     Significant Imaging:        CXR  Right basilar opacity, suggestive of pneumonia.     EKG Sinus rhythm with 1st degree A-V block  Otherwise normal ECG  When compared with ECG of 16-JAN-2015 02:05,  No significant change was found  Confirmed by Alexey Martinez MD ([id number]) on 9/20/2022 10:30:01 AM    Pending  Diagnostic Studies:       None           Medications:  Reconciled Home Medications:      Medication List        START taking these medications      clindamycin 300 MG capsule  Commonly known as: CLEOCIN  Take 1 capsule (300 mg total) by mouth 3 (three) times daily. for 8 days     diphenoxylate-atropine 2.5-0.025 mg 2.5-0.025 mg per tablet  Commonly known as: LOMOTIL  Take 1 tablet by mouth 2 (two) times daily as needed for Diarrhea.     lactobacillus acidophilus & bulgar 100 million cell packet  Commonly known as: LACTINEX  Take 1 packet (1 each total) by mouth 2 (two) times daily.     * pulse oximeter device  Commonly known as: pulse oximeter  by Apply Externally route 2 (two) times a day. Use twice daily at 8 AM and 3 PM and record the value in MyChart as directed.     * pulse oximeter device  Commonly known as: pulse oximeter  by Apply Externally route 2 (two) times a day. Use twice daily at 8 AM and 3 PM and record the value in MyChart as directed.           * This list has 2 medication(s) that are the same as other medications prescribed for you. Read the directions carefully, and ask your doctor or other care provider to review them with you.                CHANGE how you take these medications      brimonidine 0.15 % OPTH DROP 0.15 % ophthalmic solution  Commonly known as: ALPHAGAN  Place 1 drop into the left eye 3 (three) times daily.  What changed: Another medication with the same name was removed. Continue taking this medication, and follow the directions you see here.     latanoprost 0.005 % ophthalmic solution  Commonly known as: XALATAN  Place 1 drop into the left eye every evening.  What changed: Another medication with the same name was removed. Continue taking this medication, and follow the directions you see here.            CONTINUE taking these medications      artificial tears(hypromellose)(GENTEAL/SUSTANE) 0.3 % Gel  Commonly known as: Systane GeL  Apply 2 drops to eye 3 (three) times daily as  needed.     atorvastatin 20 MG tablet  Commonly known as: LIPITOR  Take 20 mg by mouth once daily.     carvediloL 6.25 MG tablet  Commonly known as: COREG  Take 1 tablet (6.25 mg total) by mouth 2 (two) times daily.     cycloSPORINE-chondroit sulf A 0.1-0.25 % Drop  Place 1 drop into both eyes 2 (two) times a day.     FLUoxetine 20 MG capsule  Commonly known as: PROzac  Take 1 capsule (20 mg total) by mouth every evening.     ketorolac 0.5% 0.5 % Drop  Commonly known as: ACULAR  Place 1 drop into the right eye 2 (two) times daily. MWF one time each of those days     levothyroxine 88 MCG tablet  Commonly known as: SYNTHROID  Take 88 mcg by mouth once daily.     magnesium oxide 400 mg (241.3 mg magnesium) tablet  Commonly known as: MAG-OX  Take 400 mg by mouth 2 (two) times daily.     mycophenolate 180 MG Tbec  Commonly known as: MYFORTIC  Take 1 tablet (180 mg total) by mouth 2 (two) times daily.     NIFEdipine 30 MG (OSM) 24 hr tablet  Commonly known as: PROCARDIA-XL  Take 1 tablet (30 mg total) by mouth once daily.     omeprazole 20 MG tablet  Commonly known as: PRILOSEC OTC  Take 1 capsule orally 2 times a day.     RESTASIS 0.05 % ophthalmic emulsion  Generic drug: cycloSPORINE     tacrolimus 0.5 MG Cap  Commonly known as: PROGRAF  Take 3 capsules (1.5 mg total) by mouth every morning AND 3 capsules (1.5 mg total) every evening.     vitamin D 1000 units Tab  Commonly known as: VITAMIN D3  Take 2,000 Units by mouth once daily.              Indwelling Lines/Drains at time of discharge:   Lines/Drains/Airways       Drain  Duration                  Hemodialysis AV Fistula -- days                    Time spent on the discharge of patient: 20 minutes         Angela Vera NP  Department of Hospital Medicine  [place] [facility] Surg (3rd Fl)

## 2022-09-22 NOTE — PT/OT/SLP EVAL
"Physical Therapy Evaluation and Discharge Note    Patient Name:  Claudette Louise Voss   MRN:  [id number]    Recommendations:     Discharge Recommendations:  home   Discharge Equipment Recommendations: none   Barriers to discharge: None    Assessment:     Claudette Louise Voss is a 67 y.o. female admitted with a medical diagnosis of Aspiration pneumonia of right lower lobe. .  At this time, patient is functioning at their prior level of function and does not require further acute PT services.     Recent Surgery: * No surgery found *      Plan:     During this hospitalization, patient does not require further acute PT services.  Please re-consult if situation changes.      Subjective     Chief Complaint: No dizziness complain during entire PT eval.  Patient/Family Comments/goals: "To go home today".  Pain/Comfort:  Pain Rating 1: 0/10    Patients cultural, spiritual, Church conflicts given the current situation: no    Living Environment:  Patient lives with  in a 2 story house with 12 steps to go up the bedroom with stair lift on one side and 1 handrail on the other side.  Prior to admission, patients level of function was Independent.  Equipment used at home: none.  DME owned (not currently used): none.  Upon discharge, patient will have assistance from .    Objective:     Communicated with patient prior to session.  Patient found sitting edge of bed with peripheral IV upon PT entry to room.    General Precautions: Standard, fall   Orthopedic Precautions:N/A   Braces: N/A   Respiratory Status: Room air    Exams:  Cognitive Exam:  Patient is oriented to Person, Place, Time, and Situation  Fine Motor Coordination:    -       Intact  Gross Motor Coordination:  WFL  Skin Integrity/Edema:      -       Skin integrity: Visible skin intact  RUE ROM: WNL  RUE Strength: WFL  LUE ROM: WNL  LUE Strength: WFL  RLE ROM: WNL  RLE Strength: WFL  LLE ROM: WNL  LLE Strength: WFL    Functional Mobility:  Bed Mobility:   "   Rolling Left:  independence  Rolling Right: independence  Scooting: independence  Supine to Sit: independence  Sit to Supine: independence  Transfers:     Sit to Stand:  independence with no AD  Bed to Chair: independence with  no AD  using  Step Transfer  Gait: Independent x ~150 feet with no AD. Reciprocal gait. Maintained O2 SAT at 96&. No sign of distress and fatigue.  Balance: Standing Dynamic: Good+ and Independent    AM-PAC 6 CLICK MOBILITY  Total Score:24       Therapeutic Activities and Exercises:   Completed PT eval. Further tx is not recommended at this time    AM-PAC 6 CLICK MOBILITY  Total Score:24     Patient left sitting edge of bed with all lines intact, call button in reach, and nursing and medical team notified.    GOALS:   Multidisciplinary Problems       Physical Therapy Goals       Not on file                    History:     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 breast 8/25/2004    PKD (polycystic kidney disease) - S/P nephrectomies 11/29/2013    Renal hypertension        Past Surgical History:   Procedure Laterality Date    AV FISTULA PLACEMENT      multiple access     BREAST LUMPECTOMY  2004    CHOLECYSTECTOMY  1987    open    COLONOSCOPY      HERNIA REPAIR  2012    KIDNEY TRANSPLANT      LYMPH NODE DISSECTION  2004    with breast cancer treatment    NEPHRECTOMY  2012    bilateral    PARATHYROIDECTOMY Bilateral 12/23/2021    Procedure: PARATHYROIDECTOMY;  Surgeon: Gera Frank MD;  [place] [facility] [place] [address] [place] [address];  Service: ENT;  Laterality: Bilateral;     SPLENECTOMY, TOTAL  2012       Time Tracking:     PT Received On: 09/22/22  PT Start Time: 0915     PT Stop Time: 0935  PT Total Time (min): 20 min     Billable Minutes: Evaluation 20 09/22/2022

## 2022-09-22 NOTE — ASSESSMENT & PLAN NOTE
Patient is identified as High risk for severe complications of COVID 19 based on COVID risk score of 3   Initiate standard COVID protocols; COVID-19 testing ,Infection Control notification  and isolation- respiratory, contact and droplet per protocol    Diagnostics: (leukopenia, hyponatremia, hyperferritinemia, elevated troponin, elevated d-dimer, age, and comorbidities are significant predictors of poor clinical outcome)  CBC, CMP and Portable CXR  Will need remdesivir - day 3  Remdesivir, an antiviral drug used to treat COVID-19, increased the likelihood of clinical improvement in COVID-19 patients on low-flow oxygen or no oxygen, according to a new study authored by the [facility] [employer] [facility] [employer] [facility], the [employer], , and [facility].    Ambulated well with PT no hypoxia no weakness

## 2022-09-22 NOTE — ASSESSMENT & PLAN NOTE
With vomiting and RLL pneumonia   Aspiration pneumonia likely   DC ceftriaxone and zithromax given x 1 in ER  Start IV clindamycin day 3   Home on po clinda to complete 10 days

## 2022-09-22 NOTE — PROGRESS NOTES
Pharmacist Renal Dose Adjustment Note    Claudette Louise Voss is a 67 y.o. female being treated with the medication famotidine    Patient Data:    Vital Signs (Most Recent):  Temp: 97.1 °F (36.2 °C) (09/21/22 1940)  Pulse: 69 (09/21/22 1940)  Resp: 18 (09/21/22 1940)  BP: (!) 115/56 (09/21/22 1940)  SpO2: 95 % (09/21/22 1940)   Vital Signs (72h Range):  Temp:  [97.1 °F (36.2 °C)-100.4 °F (38 °C)]   Pulse:  []   Resp:  [17-22]   BP: (100-181)/()   SpO2:  [91 %-99 %]      Recent Labs   Lab 09/19/22 2223 09/21/22  0824   CREATININE 0.8 1.0     Serum creatinine: 1 mg/dL 09/21/22 0824  Estimated creatinine clearance: 45.6 mL/min    Medication: famotidine dose: 20 mg frequency: BID will be changed to medication: famotidine dose: 20 mg frequency: daily    Pharmacist's Name: Hakeem Bond, PharmD, BCCCP  Pharmacist's Extension: [phone]

## 2022-09-22 NOTE — ASSESSMENT & PLAN NOTE
With vomiting and RLL pneumonia   Aspiration pneumonia likely   DC ceftriaxone and zithromax given x 1 in ER  Start IV clindamycin day 3   Home on po clinda to complete 10 days   Reviewed with patient importance of f/u and repeat cxr in about 2 weeks.

## 2022-09-22 NOTE — ASSESSMENT & PLAN NOTE
Patient is identified as High risk for severe complications of COVID 19 based on COVID risk score of 3   Initiate standard COVID protocols; COVID-19 testing ,Infection Control notification  and isolation- respiratory, contact and droplet per protocol    Diagnostics: (leukopenia, hyponatremia, hyperferritinemia, elevated troponin, elevated d-dimer, age, and comorbidities are significant predictors of poor clinical outcome)  CBC, CMP and Portable CXR  Will need remdesivir - day 3  Remdesivir, an antiviral drug used to treat COVID-19, increased the likelihood of clinical improvement in COVID-19 patients on low-flow oxygen or no oxygen, according to a new study authored by the [facility] [employer], the [employer], [facility], and [facility].    Ambulated well with PT no hypoxia no weakness

## 2022-09-22 NOTE — PLAN OF CARE
Problem: Adult Inpatient Plan of Care  Goal: Plan of Care Review  Outcome: Ongoing, Progressing  Goal: Patient-Specific Goal (Individualized)  Outcome: Ongoing, Progressing  Goal: Absence of Hospital-Acquired Illness or Injury  Outcome: Ongoing, Progressing  Goal: Optimal Comfort and Wellbeing  Outcome: Ongoing, Progressing  Goal: Readiness for Transition of Care  Outcome: Ongoing, Progressing     Problem: Diarrhea  Goal: Fluid and Electrolyte Balance  Outcome: Ongoing, Progressing

## 2022-09-22 NOTE — NURSING
Pt continues to complain of loose stools. Patient also states she feels dizzy from BP being low. Stated that earlier in the night she got up to go to the bath room and felt dizzy so she sat on the floor. No injury. MD updated. New orders. Noted.

## 2022-09-23 ENCOUNTER — HOSPITAL ENCOUNTER (EMERGENCY)
Facility: HOSPITAL | Age: 67
Discharge: HOME | End: 2022-09-23
Attending: EMERGENCY MEDICINE
Payer: MEDICARE

## 2022-09-23 ENCOUNTER — PATIENT MESSAGE (OUTPATIENT)
Dept: ADMINISTRATIVE | Facility: CLINIC | Age: 67
End: 2022-09-23
Payer: MEDICARE

## 2022-09-23 ENCOUNTER — NURSE TRIAGE (OUTPATIENT)
Dept: ADMINISTRATIVE | Facility: CLINIC | Age: 67
End: 2022-09-23
Payer: MEDICARE

## 2022-09-23 VITALS
DIASTOLIC BLOOD PRESSURE: 59 MMHG | RESPIRATION RATE: 18 BRPM | OXYGEN SATURATION: 97 % | TEMPERATURE: 98 F | HEART RATE: 62 BPM | SYSTOLIC BLOOD PRESSURE: 122 MMHG

## 2022-09-23 DIAGNOSIS — R42 DIZZINESS: Primary | ICD-10-CM

## 2022-09-23 DIAGNOSIS — I95.9 HYPOTENSION: ICD-10-CM

## 2022-09-23 DIAGNOSIS — U07.1 COVID-19: ICD-10-CM

## 2022-09-23 LAB
ALBUMIN SERPL BCP-MCNC: 3.2 G/DL (ref 3.5–5.2)
ALP SERPL-CCNC: 47 U/L (ref 55–135)
ALT SERPL W/O P-5'-P-CCNC: 27 U/L (ref 10–44)
ANION GAP SERPL CALC-SCNC: 11 MMOL/L (ref 8–16)
AST SERPL-CCNC: 36 U/L (ref 10–40)
BASOPHILS # BLD AUTO: 0.03 K/UL (ref 0–0.2)
BASOPHILS NFR BLD: 0.6 % (ref 0–1.9)
BILIRUB SERPL-MCNC: 0.3 MG/DL (ref 0.1–1)
BILIRUB UR QL STRIP: NEGATIVE
BNP SERPL-MCNC: 55 PG/ML (ref 0–99)
BUN SERPL-MCNC: 23 MG/DL (ref 8–23)
CALCIUM SERPL-MCNC: 7.8 MG/DL (ref 8.7–10.5)
CHLORIDE SERPL-SCNC: 110 MMOL/L (ref 95–110)
CLARITY UR: CLEAR
CO2 SERPL-SCNC: 18 MMOL/L (ref 23–29)
COLOR UR: YELLOW
CREAT SERPL-MCNC: 1.1 MG/DL (ref 0.5–1.4)
DIFFERENTIAL METHOD: NORMAL
EOSINOPHIL # BLD AUTO: 0.1 K/UL (ref 0–0.5)
EOSINOPHIL NFR BLD: 1.3 % (ref 0–8)
ERYTHROCYTE [DISTWIDTH] IN BLOOD BY AUTOMATED COUNT: 14.2 % (ref 11.5–14.5)
EST. GFR  (NO RACE VARIABLE): 54 ML/MIN/1.73 M^2
GLUCOSE SERPL-MCNC: 157 MG/DL (ref 70–110)
GLUCOSE UR QL STRIP: NEGATIVE
HCT VFR BLD AUTO: 38.7 % (ref 37–48.5)
HGB BLD-MCNC: 13 G/DL (ref 12–16)
HGB UR QL STRIP: NEGATIVE
IMM GRANULOCYTES # BLD AUTO: 0.01 K/UL (ref 0–0.04)
IMM GRANULOCYTES NFR BLD AUTO: 0.2 % (ref 0–0.5)
KETONES UR QL STRIP: NEGATIVE
LEUKOCYTE ESTERASE UR QL STRIP: NEGATIVE
LYMPHOCYTES # BLD AUTO: 2.3 K/UL (ref 1–4.8)
LYMPHOCYTES NFR BLD: 43.2 % (ref 18–48)
MCH RBC QN AUTO: 30.4 PG (ref 27–31)
MCHC RBC AUTO-ENTMCNC: 33.6 G/DL (ref 32–36)
MCV RBC AUTO: 90 FL (ref 82–98)
MONOCYTES # BLD AUTO: 0.6 K/UL (ref 0.3–1)
MONOCYTES NFR BLD: 12.1 % (ref 4–15)
NEUTROPHILS # BLD AUTO: 2.3 K/UL (ref 1.8–7.7)
NEUTROPHILS NFR BLD: 42.6 % (ref 38–73)
NITRITE UR QL STRIP: NEGATIVE
NRBC BLD-RTO: 0 /100 WBC
PH UR STRIP: 6 [PH] (ref 5–8)
PLATELET # BLD AUTO: 194 K/UL (ref 150–450)
PMV BLD AUTO: 10.6 FL (ref 9.2–12.9)
POTASSIUM SERPL-SCNC: 3.7 MMOL/L (ref 3.5–5.1)
PROT SERPL-MCNC: 5.9 G/DL (ref 6–8.4)
PROT UR QL STRIP: NEGATIVE
RBC # BLD AUTO: 4.28 M/UL (ref 4–5.4)
SODIUM SERPL-SCNC: 139 MMOL/L (ref 136–145)
SP GR UR STRIP: 1.02 (ref 1–1.03)
URN SPEC COLLECT METH UR: NORMAL
UROBILINOGEN UR STRIP-ACNC: NEGATIVE EU/DL
WBC # BLD AUTO: 5.3 K/UL (ref 3.9–12.7)

## 2022-09-23 PROCEDURE — 93010 ELECTROCARDIOGRAM REPORT: CPT | Mod: ,,, | Performed by: INTERNAL MEDICINE

## 2022-09-23 PROCEDURE — 99284 EMERGENCY DEPT VISIT MOD MDM: CPT | Mod: 25

## 2022-09-23 PROCEDURE — 96360 HYDRATION IV INFUSION INIT: CPT

## 2022-09-23 PROCEDURE — 81003 URINALYSIS AUTO W/O SCOPE: CPT | Performed by: EMERGENCY MEDICINE

## 2022-09-23 PROCEDURE — 36415 COLL VENOUS BLD VENIPUNCTURE: CPT | Performed by: EMERGENCY MEDICINE

## 2022-09-23 PROCEDURE — 93005 ELECTROCARDIOGRAM TRACING: CPT

## 2022-09-23 PROCEDURE — 93010 EKG 12-LEAD: ICD-10-PCS | Mod: ,,, | Performed by: INTERNAL MEDICINE

## 2022-09-23 PROCEDURE — 80053 COMPREHEN METABOLIC PANEL: CPT | Performed by: EMERGENCY MEDICINE

## 2022-09-23 PROCEDURE — 83880 ASSAY OF NATRIURETIC PEPTIDE: CPT | Performed by: EMERGENCY MEDICINE

## 2022-09-23 PROCEDURE — 25000003 PHARM REV CODE 250: Performed by: EMERGENCY MEDICINE

## 2022-09-23 PROCEDURE — 96361 HYDRATE IV INFUSION ADD-ON: CPT

## 2022-09-23 PROCEDURE — 85025 COMPLETE CBC W/AUTO DIFF WBC: CPT | Performed by: EMERGENCY MEDICINE

## 2022-09-23 RX ADMIN — SODIUM CHLORIDE 1000 ML: 0.9 INJECTION, SOLUTION INTRAVENOUS at 03:09

## 2022-09-23 NOTE — PROGRESS NOTES
[facility] ([address] [place] [address] Medicine  Progress Note    Patient Name: Claudette Louise Voss  MRN: [id number]  Patient Class: IP- Inpatient   Admission Date: 9/19/2022  Length of Stay: 2 days  Attending Physician: No att. providers found  Primary Care Provider: Gera Arora MD        Subjective:     Principal Problem:Aspiration pneumonia of right lower lobe        HPI:  Claudette Louise Voss is a 67 y.o. female  with history of prior kidney transplant, CKD, recent COVID diagnosis, presenting with nausea and vomiting.   She started with fever two days ago;  covid positive ; she was positive for covid at home   She has fevers ;chills ;nauseated ; vomiting .   Patient vomited 2 times today.  Denies abdominal pain.  No dysuria or back pain.  No diarrhea.  No chest pain.  Patient denies fever.  Attempted to take a Phenergan tablet, but vomited it up.  No other complaints. Work up in ER showed R basilar opacity consistent with pneumonia . She has been placed on IV Rocephin and IV zithromax .    She is placed in observation for pneumonia and COVID treatment .           Overview/Hospital Course:  9/21/22 Claudette Louise Voss is a 67 y.o. female with hx of kidney transplant admitted with covid- 19, RLL pneumonia, GI symptoms of N/V/D. Continues with f/o diarrhea this am  Day 2 clindamycin for possible aspiration pneumonia( after receiving Rocephin and zithromax in ED)   Day 2 Remdesevir for Covid 19 ;  O2 sat 94-95% on RA   Tmax 100.3,   Nausea was better this am , still feeling a little weak. Not requiring O2    Will d/c home       9/22 pt here with covid, RLL pneumonia, N/V and diarrhea. Was given rocephin and azithromycin in ER and changed to clinda on admission due to poss aspiration. She is on day 3 of clinda as well as day 3 of remdesivir for covid. She had a fever around lunch time yesterday so was held for another night. Last night had diarrhea- lomotil given as well as some mild hypotension 98/54  after getting up and feeling dizzy. She was given 500ml NS bolus overnight. Bp 120/62 this am. POX 96% on RA. She says she is feeling good and would like to go home.          Review of Systems   Constitutional:  Positive for fever. Negative for chills, fatigue and unexpected weight change.   HENT:  Negative for congestion, ear pain, sore throat and trouble swallowing.    Eyes:  Negative for pain and visual disturbance.   Respiratory:  Positive for cough. Negative for chest tightness and shortness of breath.    Cardiovascular:  Negative for chest pain, palpitations and leg swelling.   Gastrointestinal:  Positive for diarrhea. Negative for abdominal distention, abdominal pain, constipation, nausea and vomiting.   Genitourinary:  Negative for difficulty urinating, dysuria, flank pain, frequency and hematuria.   Musculoskeletal:  Negative for back pain, gait problem, joint swelling, neck pain and neck stiffness.   Skin:  Negative for rash and wound.   Neurological:  Negative for dizziness, seizures, speech difficulty, light-headedness and headaches.   Objective:     Vital Signs (Most Recent):  Temp: 99.5 °F (37.5 °C) (09/22/22 1113)  Pulse: 62 (09/22/22 1113)  Resp: 18 (09/22/22 1113)  BP: 110/64 (09/22/22 1113)  SpO2: 95 % (09/22/22 1113)   Vital Signs (24h Range):  Temp:  [97.1 °F (36.2 °C)-100.4 °F (38 °C)] 99.5 °F (37.5 °C)  Pulse:  [62-76] 62  Resp:  [18-20] 18  SpO2:  [94 %-98 %] 95 %  BP: ()/(54-64) 110/64     Weight: 67.8 kg (149 lb 7.6 oz)  Body mass index is 29.19 kg/m².    Physical Exam  Vitals and nursing note reviewed.   Constitutional:       Appearance: She is well-developed.   HENT:      Head: Normocephalic and atraumatic.   Cardiovascular:      Rate and Rhythm: Normal rate and regular rhythm.      Heart sounds: Normal heart sounds.   Pulmonary:      Effort: Pulmonary effort is normal.      Breath sounds: Normal breath sounds.   Abdominal:      General: Bowel sounds are normal.      Palpations:  Abdomen is soft.      Tenderness: There is no abdominal tenderness.   Skin:     General: Skin is warm and dry.   Neurological:      Mental Status: She is alert and oriented to person, place, and time.   Psychiatric:         Behavior: Behavior normal.         Thought Content: Thought content normal.         Judgment: Judgment normal.           Significant Labs:   ABGs: No results for input(s): PH, PCO2, HCO3, POCSATURATED, BE, TOTALHB, COHB, METHB, O2HB, POCFIO2, PO2 in the last 48 hours.    CBC:   Recent Labs   Lab 09/21/22  0824   WBC 6.84   HGB 13.1   HCT 38.5          CMP:   Recent Labs   Lab 09/21/22  0824      K 3.4*      CO2 24   GLU 95   BUN 16   CREATININE 1.0   CALCIUM 8.8   PROT 6.7   ALBUMIN 3.6   BILITOT 0.5   ALKPHOS 56   AST 36   ALT 28   ANIONGAP 13     9/20 lactic acid 1.3  9*/19 covid positive   Lipase 18  U/a negative     Significant Imaging:        CXR  Right basilar opacity, suggestive of pneumonia.     EKG Sinus rhythm with 1st degree A-V block  Otherwise normal ECG  When compared with ECG of 16-JAN-2015 02:05,  No significant change was found  Confirmed by Alexey Martinez MD ([id number]) on 9/20/2022 10:30:01 AM      Assessment/Plan:      * Aspiration pneumonia of right lower lobe  With vomiting and RLL pneumonia   Aspiration pneumonia likely   DC ceftriaxone and zithromax given x 1 in ER  Start IV clindamycin day 3   Home on po clinda to complete 10 days   Reviewed with patient importance of f/u and repeat cxr in about 2 weeks.        COVID-19  Patient is identified as High risk for severe complications of COVID 19 based on COVID risk score of 3   Initiate standard COVID protocols; COVID-19 testing ,Infection Control notification  and isolation- respiratory, contact and droplet per protocol    Diagnostics: (leukopenia, hyponatremia, hyperferritinemia, elevated troponin, elevated d-dimer, age, and comorbidities are significant predictors of poor clinical outcome)  CBC, CMP and  Portable CXR  Will need remdesivir - day 3  Remdesivir, an antiviral drug used to treat COVID-19, increased the likelihood of clinical improvement in COVID-19 patients on low-flow oxygen or no oxygen, according to a new study authored by the [facility] [employer] [facility] [employer], the [employer], [facility], and [facility].    Ambulated well with PT no hypoxia no weakness     Chronic immunosuppression with Prograf and MMF  Resume her home meds        Status post -donor kidney transplant for PKD - 1/16/15  Continue tacrolimus      Hypothyroidism  Resume levothyroxine      Hyperlipidemia  Lab Results   Component Value Date    LDLCALC 64.4 2015             VTE Risk Mitigation (From admission, onward)         Ordered     IP VTE HIGH RISK PATIENT  Once         22 0326                Discharge Planning   SHO: 2022     Code Status: Prior   Is the patient medically ready for discharge?:     Reason for patient still in hospital (select all that apply): Pending disposition  Discharge Plan A: Home                  Kasey Soares MD  Department of Hospital Medicine   [facility] (3rd Fl)

## 2022-09-23 NOTE — ED PROVIDER NOTES
[facility]                                                  Chief Complaint  67 y.o. female with Fatigue (Pt c/o weakness and dizziness since this morning. Pt reports low blood pressures, SBP 80's, at home. Pt was discharged from hospital for covid yesterday. )    History of Present Illness  Claudette Louise Voss presents to the emergency room with complaints of feeling dizzy and low blood pressure.  Patient states that her systolic blood pressure was in the 80s and 90s at home.  She tried eating chicken noodle soup to bring up her blood pressure but unfortunately it did not work.  Patient states that she was discharged yesterday from the hospital for COVID pneumonia but she felt fine when she left.  She states that she has not started any new medicines.  She was given an outpatient antibiotic but it is not ready yet she still has to pick it up at the pharmacy.    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 breast 8/25/2004    PKD (polycystic kidney disease) - S/P nephrectomies 11/29/2013    Renal hypertension      Past Surgical History:   Procedure Laterality Date    AV FISTULA PLACEMENT      multiple access     BREAST LUMPECTOMY  2004    CHOLECYSTECTOMY  1987    open    COLONOSCOPY      HERNIA REPAIR  2012    KIDNEY TRANSPLANT      LYMPH NODE DISSECTION  2004    with breast cancer treatment    NEPHRECTOMY  2012    bilateral    PARATHYROIDECTOMY Bilateral 12/23/2021    Procedure: PARATHYROIDECTOMY;  Surgeon: Gera Frank MD;  Location: [facility] OR [address] [place] [address];   Service: ENT;  Laterality: Bilateral;    SPLENECTOMY, TOTAL  2012      Review of patient's allergies indicates:   Allergen Reactions    Betamethasone acet,sod phos      Other reaction(s): Propensity to adverse reactions to drug    [place] Other (See Comments)    Acetaminophen Rash    Aspirin Rash    Vancomycin Rash    Yeast, dried Rash        Review of Systems and Physical Exam     Review of Systems  -- Constitution - no fever, no weight loss, no loss of consciousness reports hypotension  -- Eyes - no changes in vision, no redness, no swelling  -- Ear, Nose - no  earache, denies congestion  -- Mouth,Throat - no sore throat, no toothache, normal voice, normal swallowing  -- Respiratory - denies cough and congestion, no shortness of breath, no wheezing  -- Cardiovascular - denies chest pain, no palpitations,   -- Gastrointestinal - denies abdominal pain, denies nausea, vomiting, and diarrhea  -- Genitourinary - no dysuria, no denies flank pain, no hematuria or frequency   -- Musculoskeletal - denies back pain, negative for myalgias and arthralgias   -- Neurological - no headache, no neurologic changes, no loss of bladder or bowel function no seizure like activity, no changes in hearing or vision reports dizziness  -- Skin - denies skin changes, no rash, no hives, no suspected skin infection    Vital Signs   oral temperature is 97.6 °F (36.4 °C). Her blood pressure is 95/51 (abnormal) and her pulse is 70. Her respiration is 19 and oxygen saturation is 95%.      Physical Exam  -- Nursing note and vitals reviewed  -- Constitutional:  Awake alert and oriented, GCS 15, no acute distress.  Appears well.  -- Head: Atraumatic. Normocephalic. No obvious abnormality  -- Eyes: Pupils are equal and reactive to light. Extraocular movements intact. No nystagmus.  No periorbital swelling. Normal conjunctiva.  -- Nose: Nose grossly normal in appearance, nares grossly normal. No rhinorrhea.  -- Throat: Mucous membranes moist, pharynx  normal, normal tonsils.  Airway patent.  -- Ears: External ears and TM normal bilaterally. Normal hearing.   -- Neck: Normal range of motion. Neck supple. No meningismus. No adenopathy  -- Cardiac: Normal rate, regular rhythm and normal heart sounds. No carotid bruit. No lower extremity edema.  -- Pulmonary: Normal respiratory effort, breath sounds equal bilaterally. Adequate flow.  No wheezing.  No crackles.  -- Abdominal: Soft, no tenderness, no guarding, no rebound. Normal bowel sounds.   -- Musculoskeletal: Normal range of motion, all 4 extremities 5/5 strength.  Neurovascularly intact. Atraumatic. No deformities.  -- Neurological:  Cranial nerves 2-12 grossly intact. No focal deficits.   -- Vascular: Posterior tibial, dorsalis pedis and radial pulses 2+ bilaterally    -- Lymphatics: No cervical or peripheral lymphadenopathy.   -- Skin: Warm and dry. No evidence of rash or cellulitis  -- Psychiatric: Normal mood and affect. Bedside behavior is appropriate.  Patient is cooperative.  Denies suicidal homicidal ideation.    Emergency Room Course     Treatment Course, Evaluation, and Medical Decision Making  1. Physical exam unremarkable blood pressure 95/51 on arrival   2. Orthostatic vitals no evidence of tilt or hypotension  3. CBC/CMP within normal limits  4. BNP within normal limits  5. EKG normal sinus rhythm  6. Normal saline 1 L   7. Patient ambulated and states that she feels fine without dizziness.  8. Discharge home      Abnormal lab values  Labs Reviewed   CBC W/ AUTO DIFFERENTIAL   COMPREHENSIVE METABOLIC PANEL   B-TYPE NATRIURETIC PEPTIDE       Medications Given  Medications - No data to display      Diagnosis  -- COVID-19  --dizziness, resolved    Disposition and Plan  -- Disposition: home  -- Condition: stable  -- Follow-up: Patient to follow up with Gera Arora MD in 1-2 days, and any specialists noted on discharge paperwork  -- I advised the patient that we have found no life threatening  condition today  -- At this time, I believe the patient is clinically stable for discharge.   -- The patient acknowledges that close follow up with a MD is required   -- Patient agrees to comply with all instruction and direction given in the ER  -- Patient counseled on strict return precautions as discussed       Kristine Chery MD  09/23/22 [id number]

## 2022-09-23 NOTE — TELEPHONE ENCOUNTER
[place]    PCP:  Dr. Gera Arora    1st attempt to contact pt regarding enrollment in the Covid Surveillance Program.  Covid Surveillance Program explained to pt.  Pt declines participation in the Covid Surveillance Program but accepts participation in the HSM Program.  [id number] ordered.  Tasks removed.       Pt reports BP=86/53 currently.  She does take BP meds.  She reports that she did take her meds this morning.  She states that she is about to eat some chicken noodle soup to bring her BP back up.  She reports that she did not check her BP this morning before she took her BP meds.  She reports that she is having dizziness, lightheadedness, and weakness.  Per protocol, care advised is call  now.  Pt refused care advised.  Care advice reinforced but pt continues to refuse care advice.  She states that she will eat the soup and then recheck it and if it is low then she will either have her  take her to the ED or she will call EMS.  Instructed to call for worsening/questions/concerns.  VU.     Reason for Disposition   Systolic BP < 90 and feeling dizzy, lightheaded, or weak    Protocols used: Blood Pressure - Low-A-OH

## 2022-09-27 ENCOUNTER — PATIENT OUTREACH (OUTPATIENT)
Dept: ADMINISTRATIVE | Facility: CLINIC | Age: 67
End: 2022-09-27
Payer: MEDICARE

## 2022-09-27 NOTE — PROGRESS NOTES
C3 nurse spoke with Claudette Louise Voss for a TCC post hospital discharge follow up call. The patient has a scheduled HOSFU appointment with Gera Arora MD on 9/29/22 @ 5207.

## 2022-09-29 ENCOUNTER — OUTPATIENT CASE MANAGEMENT (OUTPATIENT)
Dept: ADMINISTRATIVE | Facility: OTHER | Age: 67
End: 2022-09-29
Payer: MEDICARE

## 2022-09-29 NOTE — PROGRESS NOTES
Outpatient Care Management   - Low Risk Patient Assessment    Patient: Claudette Louise Voss  MRN:  [id number]  Date of Service:  9/29/2022  Completed by:  Alexus Collier LCSW  Referral Date: 09/21/2022    Reason for Visit   Patient presents with    Social Work Assessment - Low/Mod Risk    Case Closure     Brief Summary: Sw received referral for patient from [facility]. Sw completed low risk assessment with Pt via telephone. Pt reports living with her  and reports being independent with ADLs/IADLs. Pt does not currently use assistive devices to ambulate. Pt denies difficulty affording food, housing, medications or medical care. Pt's spouse provides transportation to doctors appointments. Pt is retired and not working. She has not completed ACP documents and declines to complete at this time. Pt confirms hospital follow up visit completed this morning. Pt denies needing additional resources or support at this time. Pt advised to reach out to PCP if need arise in the future.     Patient Summary     OPCM Social Work Assessment (Low/Moderate Risk)    General  Level of Caregiver support: Member independent and does not need caregiver assistance  Have you had to make a decision between paying for any of the following in the last 2 months?: None  Transportation means: Family  Employment status: Retired and not working  Current symptoms: None  Assessments  Was the PHQ Depression Screening completed this visit?: No  Was the ILIA-7 Screening completed this visit?: No         Complex Care Plan     Care plan was discussed and completed today with input from patient and/or caregiver.    Patient Instructions     No follow-ups on file.    Todays OPCM Self-Management Care Plan was developed with the patients/caregivers input and was based on identified barriers from todays assessment.  Goals were written today with the patient/caregiver and the patient has agreed to work towards these goals to improve  his/her overall well-being. Patient verbalized understanding of the care plan, goals, and all of today's instructions. Encouraged patient/caregiver to communicate with his/her physician and health care team about health conditions and the treatment plan.  Provided my contact information today and encouraged patient/caregiver to call me with any questions as needed.

## 2022-10-11 ENCOUNTER — OFFICE VISIT (OUTPATIENT)
Dept: OPHTHALMOLOGY | Facility: CLINIC | Age: 67
End: 2022-10-11
Payer: MEDICARE

## 2022-10-11 DIAGNOSIS — H04.123 DRY EYE SYNDROME, BILATERAL: ICD-10-CM

## 2022-10-11 DIAGNOSIS — H44.2C3 BOTH EYES AFFECTED BY DEGENERATIVE MYOPIA WITH RETINAL DETACHMENT: Primary | ICD-10-CM

## 2022-10-11 DIAGNOSIS — H18.9 EXPOSURE KERATOPATHY: ICD-10-CM

## 2022-10-11 DIAGNOSIS — H40.1133 PRIMARY OPEN ANGLE GLAUCOMA (POAG) OF BOTH EYES, SEVERE STAGE: ICD-10-CM

## 2022-10-11 DIAGNOSIS — H35.351 CYSTOID MACULAR EDEMA, RIGHT: ICD-10-CM

## 2022-10-11 DIAGNOSIS — H35.721 RETINAL PIGMENT EPITHELIAL DETACHMENT, RIGHT: ICD-10-CM

## 2022-10-11 PROCEDURE — 92134 CPTRZ OPH DX IMG PST SGM RTA: CPT | Mod: PBBFAC | Performed by: OPHTHALMOLOGY

## 2022-10-11 PROCEDURE — 99213 OFFICE O/P EST LOW 20 MIN: CPT | Mod: PBBFAC | Performed by: OPHTHALMOLOGY

## 2022-10-11 PROCEDURE — 99214 OFFICE O/P EST MOD 30 MIN: CPT | Mod: S$PBB,,, | Performed by: OPHTHALMOLOGY

## 2022-10-11 PROCEDURE — 99999 PR PBB SHADOW E&M-EST. PATIENT-LVL III: ICD-10-PCS | Mod: PBBFAC,,, | Performed by: OPHTHALMOLOGY

## 2022-10-11 PROCEDURE — 99214 PR OFFICE/OUTPT VISIT, EST, LEVL IV, 30-39 MIN: ICD-10-PCS | Mod: S$PBB,,, | Performed by: OPHTHALMOLOGY

## 2022-10-11 PROCEDURE — 92134 OCT, RETINA - OU - BOTH EYES: ICD-10-PCS | Mod: 26,S$PBB,, | Performed by: OPHTHALMOLOGY

## 2022-10-11 PROCEDURE — 99999 PR PBB SHADOW E&M-EST. PATIENT-LVL III: CPT | Mod: PBBFAC,,, | Performed by: OPHTHALMOLOGY

## 2022-10-11 NOTE — PROGRESS NOTES
HPI    2m fu OCT/DFE OU  DLS- 08/23/2022 Dr. Rendon     Pt states vision is about the same since last visit.  Denies pain     (-)Flashes (-)Floaters    Gtts: Ketorolac 4x a week OD   Alphagan TID OS   Restasis BID OU   Latanoprost QHS OS   Refresh every hour OU   Last edited by Cierra Farmer on 10/11/2022 10:04 AM.          A/P    ICD-10-CM ICD-9-CM   1. 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 Retinal pigment epithelial detachment, right  H35.721 362.42   3. Cystoid macular edema, right  H35.351 362.53   4. Primary open angle glaucoma (POAG) of both eyes, severe stage  H40.1133 365.11     365.73   5. Exposure keratopathy  H18.9 371.40   6. Dry eye syndrome, bilateral  H04.123 375.15         1. 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 Cystoid macular edema, right  Hx myopic degeneration with RD, s/p repair OU  Retina flat and attached, prev was taken care of by Dr. David at     chronic CME OD (due to decentered IOL) has had inj and daily used acular BID with significant dry eye symptoms    Today using Acular 4x weekly, IRF resolved     Plan: continue 4x week Acular for control of recurrent IRF     3. Retinal pigment epithelial detachment, right  Mild PED no heme  Plan: Observation     4. Primary open angle glaucoma (POAG) of both eyes, severe stage  F/b Dr. Frias  IOP 8/13  Plan: Continue Present Management     5. Pseudophakia of both eyes  Decentered IOL OD, stable for now  OS is good position  Plan: Observation     6. Exposure keratopathy  7. Dry eye syndrome, bilateral  OD moreso than OS, patient has hx bleph OU, but patient states her eyelids are open at night and she has to wear goggles  Plan: has appt w Dr Chaudhari in Nov    RT Julieta - 3 month DFE/OCTm OU  RTC Watson Nov as scheduled    I saw and examined the patient and reviewed in detail the findings documented. The final examination findings, image interpretations, and plan as  documented in the record represent my personal judgment and conclusions.    Guillermo Rendon MD  Vitreoretinal Surgery   [facility]

## 2022-11-03 ENCOUNTER — OFFICE VISIT (OUTPATIENT)
Dept: OPHTHALMOLOGY | Facility: CLINIC | Age: 67
End: 2022-11-03
Payer: MEDICARE

## 2022-11-03 DIAGNOSIS — H02.222 MECHANICAL LAGOPHTHALMOS OF RIGHT LOWER EYELID: ICD-10-CM

## 2022-11-03 DIAGNOSIS — H02.225 MECHANICAL LAGOPHTHALMOS OF LEFT LOWER EYELID: ICD-10-CM

## 2022-11-03 DIAGNOSIS — H18.9 EXPOSURE KERATOPATHY: ICD-10-CM

## 2022-11-03 DIAGNOSIS — H35.351 CYSTOID MACULAR EDEMA, RIGHT: ICD-10-CM

## 2022-11-03 DIAGNOSIS — H04.123 DRY EYE SYNDROME, BILATERAL: ICD-10-CM

## 2022-11-03 DIAGNOSIS — H44.2C3 BOTH EYES AFFECTED BY DEGENERATIVE MYOPIA WITH RETINAL DETACHMENT: ICD-10-CM

## 2022-11-03 DIAGNOSIS — H05.229 ORBITAL SWELLING: Primary | ICD-10-CM

## 2022-11-03 DIAGNOSIS — H35.721 RETINAL PIGMENT EPITHELIAL DETACHMENT, RIGHT: ICD-10-CM

## 2022-11-03 DIAGNOSIS — H02.135 SENILE ECTROPION OF BOTH LOWER EYELIDS: Primary | ICD-10-CM

## 2022-11-03 DIAGNOSIS — H40.1133 PRIMARY OPEN ANGLE GLAUCOMA (POAG) OF BOTH EYES, SEVERE STAGE: ICD-10-CM

## 2022-11-03 DIAGNOSIS — H02.132 SENILE ECTROPION OF BOTH LOWER EYELIDS: Primary | ICD-10-CM

## 2022-11-03 PROCEDURE — 99999 PR PBB SHADOW E&M-EST. PATIENT-LVL III: CPT | Mod: PBBFAC,,, | Performed by: OPHTHALMOLOGY

## 2022-11-03 PROCEDURE — 92285 EXTERNAL OCULAR PHOTOGRAPHY: CPT | Mod: PBBFAC | Performed by: OPHTHALMOLOGY

## 2022-11-03 PROCEDURE — 68840 EXPLORE/IRRIGATE TEAR DUCTS: CPT | Mod: 50,S$PBB,, | Performed by: OPHTHALMOLOGY

## 2022-11-03 PROCEDURE — 99213 OFFICE O/P EST LOW 20 MIN: CPT | Mod: PBBFAC | Performed by: OPHTHALMOLOGY

## 2022-11-03 PROCEDURE — 99214 OFFICE O/P EST MOD 30 MIN: CPT | Mod: 25,S$PBB,, | Performed by: OPHTHALMOLOGY

## 2022-11-03 PROCEDURE — 68840 EXPLORE/IRRIGATE TEAR DUCTS: CPT | Mod: PBBFAC | Performed by: OPHTHALMOLOGY

## 2022-11-03 PROCEDURE — 92285 EXTERNAL PHOTOGRAPHY - OU - BOTH EYES: ICD-10-PCS | Mod: 26,S$PBB,, | Performed by: OPHTHALMOLOGY

## 2022-11-03 PROCEDURE — 99214 PR OFFICE/OUTPT VISIT, EST, LEVL IV, 30-39 MIN: ICD-10-PCS | Mod: 25,S$PBB,, | Performed by: OPHTHALMOLOGY

## 2022-11-03 PROCEDURE — 68840 PR EXPLORE LACRIMAL CANALICULI: ICD-10-PCS | Mod: 50,S$PBB,, | Performed by: OPHTHALMOLOGY

## 2022-11-03 PROCEDURE — 99999 PR PBB SHADOW E&M-EST. PATIENT-LVL III: ICD-10-PCS | Mod: PBBFAC,,, | Performed by: OPHTHALMOLOGY

## 2022-11-03 NOTE — PROGRESS NOTES
HPI    Ref by:    Pt here today for Ectropion evaluation OU.Pt states he lids turn out and   states bothers her  mostly as she sleeps.Pt states when she wakes up in   the morning her eyes are very irritated and totally dried out.    EYE MEDS:  Ketorolac 4x a week OD   Alphagan TID OS   Restasis BID OU   Latanoprost QHS OS   PF Refresh every hour OU       Last edited by Penelope King on 11/3/2022 10:26 AM.            Assessment /Plan     For exam results, see Encounter Report.    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  This has been progressive and affects activities of daily living.  The patient is currently using artificial tears approximately every hour throughout the day in addition to using Restasis 1 drop twice daily to both eyes.  She is also sleeping with goggles.  She is currently using Alphagan Ketoralac and latanoprost on a daily basis.  She has a history of an upper eyelid blepharoplasty approximately 10 years prior.    On exam, the patient has chronic frontalis use.  She has mild bilateral upper eyelid dermatochalasis slightly worse on the left than the right.  She has bilateral lower eyelid retraction with severe laxity worse on the left than the right with associated inferior exposure keratopathy.  She has facial ptosis.  She has lagophthalmos more prominently on the left side than the right on gentle closure.       Irrigation:  OD:patent upper punctum unable to pass into canaliculus  OS:patent lower punctum, canaliculus, and nld with 100% flow to the nose    These findings were discussed with the patient.      Recommend bilateral lower eyelid canthoplasty with midface lift to allow closure of both eyes and improvement of irritation bilaterally.      Will obtain baseline CT Orbits without contrast as patient with asymmetric eyelid retraction left greater than right.     Informed consent obtained after extensive risks/benefits/alternatives were discussed with the patient including but not limited to pain, bleeding, infection, ocular injury, loss of the eye, asymmetry, need for revision in future, scarring.  Alternatives such as waiting were discussed.  All questions were answered.       Hold ASA, NSAIDS, and fish oil 5 to 7 days prior to procedure.     Return for surgery

## 2022-11-04 ENCOUNTER — TELEPHONE (OUTPATIENT)
Dept: OPHTHALMOLOGY | Facility: CLINIC | Age: 67
End: 2022-11-04
Payer: MEDICARE

## 2022-11-04 DIAGNOSIS — H02.135 SENILE ECTROPION OF BOTH LOWER EYELIDS: ICD-10-CM

## 2022-11-04 DIAGNOSIS — H02.409 FACIAL PTOSIS: Primary | ICD-10-CM

## 2022-11-04 DIAGNOSIS — H02.132 SENILE ECTROPION OF BOTH LOWER EYELIDS: ICD-10-CM

## 2022-11-10 ENCOUNTER — TELEPHONE (OUTPATIENT)
Dept: NEPHROLOGY | Facility: CLINIC | Age: 67
End: 2022-11-10
Payer: MEDICARE

## 2022-11-10 NOTE — TELEPHONE ENCOUNTER
----- Message from Karen Marks sent at 11/10/2022  1:48 PM CST -----  Contact: pt  Pt states she is very over due for her follow up with provider. She was released from the transplant team and informed her she now just needs to keep following up with provider. No available appts in system for me to assist with scheduling. Around 11am if possible due to travel.     Confirmed contact below:  Contact Name:Claudette Cinthia  Phone Number: [phone]

## 2022-12-07 ENCOUNTER — TELEPHONE (OUTPATIENT)
Dept: OPHTHALMOLOGY | Facility: CLINIC | Age: 67
End: 2022-12-07
Payer: MEDICARE

## 2022-12-07 ENCOUNTER — ANESTHESIA EVENT (OUTPATIENT)
Dept: SURGERY | Facility: HOSPITAL | Age: 67
End: 2022-12-07
Payer: MEDICARE

## 2022-12-07 NOTE — TELEPHONE ENCOUNTER
Called pt with preop information     Sx arrival time given 8AM     No restricted meds taken per pt

## 2022-12-07 NOTE — H&P
Pre-Operative History & Physical  Ophthalmology      SUBJECTIVE:     History of Present Illness:  Patient is a 67 y.o. female presents with senile ectropion OU    MEDICATIONS:   No medications prior to admission.       ALLERGIES:   Review of patient's allergies indicates:   Allergen Reactions    Betamethasone acet,sod phos      Other reaction(s): Propensity to adverse reactions to drug    Cayenne Other (See Comments)    Acetaminophen Rash    Aspirin Rash    Vancomycin Rash    Yeast, dried Rash       PAST MEDICAL HISTORY:   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 breast 8/25/2004    PKD (polycystic kidney disease) - S/P nephrectomies 11/29/2013    Renal hypertension      PAST SURGICAL HISTORY:   Past Surgical History:   Procedure Laterality Date    AV FISTULA PLACEMENT      multiple access     BREAST LUMPECTOMY  2004    CHOLECYSTECTOMY  1987    open    COLONOSCOPY      HERNIA REPAIR  2012    KIDNEY TRANSPLANT      LYMPH NODE DISSECTION  2004    with breast cancer treatment    NEPHRECTOMY  2012    bilateral    PARATHYROIDECTOMY Bilateral 12/23/2021    Procedure: PARATHYROIDECTOMY;  Surgeon: Gera Frank MD;  Location: [facility] OR [address] [place] [address];  Service: ENT;  Laterality: Bilateral;    SPLENECTOMY, TOTAL  2012     PAST FAMILY HISTORY:   Family History   Problem Relation Age of Onset    Stroke Mother     Diabetes Mother     Kidney disease Father         PKD    Kidney disease Sister         PKD    Cancer Neg Hx      SOCIAL HISTORY:   Social History     Tobacco Use    Smoking status: Never     Smokeless tobacco: Never   Substance Use Topics    Alcohol use: No    Drug use: No        MENTAL STATUS: Alert    REVIEW OF SYSTEMS: Negative    OBJECTIVE:     Vital Signs (Most Recent)       Physical Exam:  General: NAD  HEENT: No change in ophthalmic/adnexal exam from prior consult/clinic note.   Lungs: Adequate respirations  Heart: + pulses  Abdomen: Soft    ASSESSMENT/PLAN:     Patient is a 67 y.o. female with senile ectropion OU    Planned Procedure: bilateral lower eyelid canthoplasty with midface lift    -Risks/benefits/alternatives of the procedure were discussed extensively with the patient and/or family, including (but not limited to): infection, bleeding, scarring, wound dehiscence, injury to local vascular and/or neurologic structures, exposure keratopathy, poor cosmetic outcome, need for subsequent surgeries, and globe trauma resulting in possible blindness and/or need for removal of the eye. The patient/family voiced good understanding. The informed consent was signed, witnessed, and placed in chart. All patient and family questions were answered.     -Anesthesia: local w MAC  -Allergies reviewed:   Review of patient's allergies indicates:   Allergen Reactions    Betamethasone acet,sod phos      Other reaction(s): Propensity to adverse reactions to drug    Cayenne Other (See Comments)    Acetaminophen Rash    Aspirin Rash    Vancomycin Rash    Yeast, dried Rash     -Anticoagulation/NSAIDS: suspended      [facility] PGY-3  Att: Watson

## 2022-12-08 ENCOUNTER — ANESTHESIA (OUTPATIENT)
Dept: SURGERY | Facility: HOSPITAL | Age: 67
End: 2022-12-08
Payer: MEDICARE

## 2022-12-08 ENCOUNTER — HOSPITAL ENCOUNTER (OUTPATIENT)
Facility: HOSPITAL | Age: 67
Discharge: HOME | End: 2022-12-08
Attending: OPHTHALMOLOGY | Admitting: OPHTHALMOLOGY
Payer: MEDICARE

## 2022-12-08 VITALS
DIASTOLIC BLOOD PRESSURE: 77 MMHG | RESPIRATION RATE: 20 BRPM | TEMPERATURE: 98 F | HEART RATE: 83 BPM | SYSTOLIC BLOOD PRESSURE: 179 MMHG | HEIGHT: 59 IN | WEIGHT: 150 LBS | OXYGEN SATURATION: 93 % | BODY MASS INDEX: 30.24 KG/M2

## 2022-12-08 DIAGNOSIS — H02.135 SENILE ECTROPION OF BOTH LOWER EYELIDS: Primary | ICD-10-CM

## 2022-12-08 DIAGNOSIS — H18.9 EXPOSURE KERATOPATHY: Primary | ICD-10-CM

## 2022-12-08 DIAGNOSIS — E21.3 HYPERPARATHYROIDISM, UNSPECIFIED: ICD-10-CM

## 2022-12-08 DIAGNOSIS — H02.132 SENILE ECTROPION OF BOTH LOWER EYELIDS: Primary | ICD-10-CM

## 2022-12-08 DIAGNOSIS — H04.123 DRY EYE SYNDROME, BILATERAL: ICD-10-CM

## 2022-12-08 DIAGNOSIS — M81.8 OTHER OSTEOPOROSIS WITHOUT CURRENT PATHOLOGICAL FRACTURE: Primary | ICD-10-CM

## 2022-12-08 DIAGNOSIS — H02.109 ECTROPION: ICD-10-CM

## 2022-12-08 PROCEDURE — 36000707: Performed by: OPHTHALMOLOGY

## 2022-12-08 PROCEDURE — 37000008 HC ANESTHESIA 1ST 15 MINUTES: Performed by: OPHTHALMOLOGY

## 2022-12-08 PROCEDURE — D9220A PRA ANESTHESIA: ICD-10-PCS | Mod: CRNA,,, | Performed by: NURSE ANESTHETIST, CERTIFIED REGISTERED

## 2022-12-08 PROCEDURE — 25000003 PHARM REV CODE 250: Performed by: NURSE ANESTHETIST, CERTIFIED REGISTERED

## 2022-12-08 PROCEDURE — D9220A PRA ANESTHESIA: Mod: ANES,,, | Performed by: ANESTHESIOLOGY

## 2022-12-08 PROCEDURE — 67917 PR FIX ECTROPION,ENTENSV LID REPAIR: ICD-10-PCS | Mod: 51,50,, | Performed by: OPHTHALMOLOGY

## 2022-12-08 PROCEDURE — 63600175 PHARM REV CODE 636 W HCPCS: Performed by: NURSE ANESTHETIST, CERTIFIED REGISTERED

## 2022-12-08 PROCEDURE — 71000015 HC POSTOP RECOV 1ST HR: Performed by: OPHTHALMOLOGY

## 2022-12-08 PROCEDURE — D9220A PRA ANESTHESIA: ICD-10-PCS | Mod: ANES,,, | Performed by: ANESTHESIOLOGY

## 2022-12-08 PROCEDURE — 71000044 HC DOSC ROUTINE RECOVERY FIRST HOUR: Performed by: OPHTHALMOLOGY

## 2022-12-08 PROCEDURE — 71000045 HC DOSC ROUTINE RECOVERY EA ADD'L HR: Performed by: OPHTHALMOLOGY

## 2022-12-08 PROCEDURE — 25000003 PHARM REV CODE 250

## 2022-12-08 PROCEDURE — 15730 MDFC FLAP W/PRSRV VASC PEDCL: CPT | Mod: ,,, | Performed by: OPHTHALMOLOGY

## 2022-12-08 PROCEDURE — 15730 PR CREATION FLAP, MIDFACE  W/PRESERVATION OF VASC PEDICLES: ICD-10-PCS | Mod: ,,, | Performed by: OPHTHALMOLOGY

## 2022-12-08 PROCEDURE — D9220A PRA ANESTHESIA: Mod: CRNA,,, | Performed by: NURSE ANESTHETIST, CERTIFIED REGISTERED

## 2022-12-08 PROCEDURE — 37000009 HC ANESTHESIA EA ADD 15 MINS: Performed by: OPHTHALMOLOGY

## 2022-12-08 PROCEDURE — 67917 REPAIR EYELID DEFECT: CPT | Mod: 51,50,, | Performed by: OPHTHALMOLOGY

## 2022-12-08 PROCEDURE — 36000706: Performed by: OPHTHALMOLOGY

## 2022-12-08 RX ORDER — EPHEDRINE SULFATE 50 MG/ML
INJECTION, SOLUTION INTRAVENOUS
Status: DISCONTINUED | OUTPATIENT
Start: 2022-12-08 | End: 2022-12-08

## 2022-12-08 RX ORDER — TRAMADOL HYDROCHLORIDE 50 MG/1
50 TABLET ORAL EVERY 6 HOURS PRN
Qty: 16 TABLET | Refills: 0 | Status: SHIPPED | OUTPATIENT
Start: 2022-12-08 | End: 2023-03-08

## 2022-12-08 RX ORDER — CEFAZOLIN SODIUM 500 MG/2.2ML
INJECTION, POWDER, FOR SOLUTION INTRAMUSCULAR; INTRAVENOUS
Status: DISCONTINUED
Start: 2022-12-08 | End: 2022-12-08 | Stop reason: HOSPADM

## 2022-12-08 RX ORDER — ROCURONIUM BROMIDE 10 MG/ML
INJECTION, SOLUTION INTRAVENOUS
Status: DISCONTINUED | OUTPATIENT
Start: 2022-12-08 | End: 2022-12-08

## 2022-12-08 RX ORDER — TOBRAMYCIN AND DEXAMETHASONE 3; 1 MG/ML; MG/ML
SUSPENSION/ DROPS OPHTHALMIC
Status: DISCONTINUED
Start: 2022-12-08 | End: 2022-12-08 | Stop reason: HOSPADM

## 2022-12-08 RX ORDER — BUPIVACAINE HYDROCHLORIDE 5 MG/ML
INJECTION, SOLUTION EPIDURAL; INTRACAUDAL
Status: DISCONTINUED
Start: 2022-12-08 | End: 2022-12-08 | Stop reason: WASHOUT

## 2022-12-08 RX ORDER — PROPOFOL 10 MG/ML
VIAL (ML) INTRAVENOUS
Status: DISCONTINUED | OUTPATIENT
Start: 2022-12-08 | End: 2022-12-08

## 2022-12-08 RX ORDER — ONDANSETRON 2 MG/ML
INJECTION INTRAMUSCULAR; INTRAVENOUS
Status: DISCONTINUED | OUTPATIENT
Start: 2022-12-08 | End: 2022-12-08

## 2022-12-08 RX ORDER — SODIUM CHLORIDE 0.9 % (FLUSH) 0.9 %
10 SYRINGE (ML) INJECTION
Status: ACTIVE | OUTPATIENT
Start: 2022-12-08

## 2022-12-08 RX ORDER — FENTANYL CITRATE 50 UG/ML
25 INJECTION, SOLUTION INTRAMUSCULAR; INTRAVENOUS EVERY 5 MIN PRN
Status: DISCONTINUED | OUTPATIENT
Start: 2022-12-08 | End: 2022-12-08 | Stop reason: HOSPADM

## 2022-12-08 RX ORDER — DEXMEDETOMIDINE HYDROCHLORIDE 100 UG/ML
INJECTION, SOLUTION INTRAVENOUS
Status: DISCONTINUED | OUTPATIENT
Start: 2022-12-08 | End: 2022-12-08

## 2022-12-08 RX ORDER — FENTANYL CITRATE 50 UG/ML
INJECTION, SOLUTION INTRAMUSCULAR; INTRAVENOUS
Status: DISCONTINUED | OUTPATIENT
Start: 2022-12-08 | End: 2022-12-08

## 2022-12-08 RX ORDER — LIDOCAINE HYDROCHLORIDE 20 MG/ML
INJECTION, SOLUTION EPIDURAL; INFILTRATION; INTRACAUDAL; PERINEURAL
Status: DISCONTINUED
Start: 2022-12-08 | End: 2022-12-08 | Stop reason: WASHOUT

## 2022-12-08 RX ORDER — HALOPERIDOL 5 MG/ML
0.5 INJECTION INTRAMUSCULAR EVERY 10 MIN PRN
Status: DISCONTINUED | OUTPATIENT
Start: 2022-12-08 | End: 2022-12-08 | Stop reason: HOSPADM

## 2022-12-08 RX ORDER — TETRACAINE HYDROCHLORIDE 5 MG/ML
1 SOLUTION OPHTHALMIC
Status: DISCONTINUED | OUTPATIENT
Start: 2022-12-08 | End: 2023-03-08

## 2022-12-08 RX ORDER — LIDOCAINE HYDROCHLORIDE 20 MG/ML
INJECTION INTRAVENOUS
Status: DISCONTINUED | OUTPATIENT
Start: 2022-12-08 | End: 2022-12-08

## 2022-12-08 RX ORDER — EPINEPHRINE 1 MG/ML
INJECTION, SOLUTION, CONCENTRATE INTRAVENOUS
Status: DISCONTINUED
Start: 2022-12-08 | End: 2022-12-08 | Stop reason: WASHOUT

## 2022-12-08 RX ORDER — DEXAMETHASONE SODIUM PHOSPHATE 4 MG/ML
INJECTION, SOLUTION INTRA-ARTICULAR; INTRALESIONAL; INTRAMUSCULAR; INTRAVENOUS; SOFT TISSUE
Status: DISCONTINUED | OUTPATIENT
Start: 2022-12-08 | End: 2022-12-08

## 2022-12-08 RX ORDER — MIDAZOLAM HYDROCHLORIDE 1 MG/ML
INJECTION, SOLUTION INTRAMUSCULAR; INTRAVENOUS
Status: DISCONTINUED | OUTPATIENT
Start: 2022-12-08 | End: 2022-12-08

## 2022-12-08 RX ORDER — CEFAZOLIN SODIUM 1 G/3ML
INJECTION, POWDER, FOR SOLUTION INTRAMUSCULAR; INTRAVENOUS
Status: DISCONTINUED | OUTPATIENT
Start: 2022-12-08 | End: 2022-12-08

## 2022-12-08 RX ADMIN — DEXAMETHASONE SODIUM PHOSPHATE 4 MG: 4 INJECTION, SOLUTION INTRAMUSCULAR; INTRAVENOUS at 11:12

## 2022-12-08 RX ADMIN — ROCURONIUM BROMIDE 40 MG: 10 INJECTION INTRAVENOUS at 11:12

## 2022-12-08 RX ADMIN — LIDOCAINE HYDROCHLORIDE 60 MG: 20 INJECTION INTRAVENOUS at 11:12

## 2022-12-08 RX ADMIN — ROCURONIUM BROMIDE 10 MG: 10 INJECTION INTRAVENOUS at 01:12

## 2022-12-08 RX ADMIN — PROPOFOL 120 MG: 10 INJECTION, EMULSION INTRAVENOUS at 11:12

## 2022-12-08 RX ADMIN — ONDANSETRON 4 MG: 2 INJECTION INTRAMUSCULAR; INTRAVENOUS at 01:12

## 2022-12-08 RX ADMIN — SODIUM CHLORIDE: 0.9 INJECTION, SOLUTION INTRAVENOUS at 11:12

## 2022-12-08 RX ADMIN — CEFAZOLIN 2 G: 225 INJECTION, POWDER, FOR SOLUTION INTRAMUSCULAR; INTRAVENOUS at 11:12

## 2022-12-08 RX ADMIN — FENTANYL CITRATE 50 MCG: 50 INJECTION, SOLUTION INTRAMUSCULAR; INTRAVENOUS at 11:12

## 2022-12-08 RX ADMIN — SODIUM CHLORIDE 0.2 MCG/KG/MIN: 9 INJECTION, SOLUTION INTRAVENOUS at 12:12

## 2022-12-08 RX ADMIN — DEXMEDETOMIDINE HYDROCHLORIDE 8 MCG: 100 INJECTION, SOLUTION INTRAVENOUS at 11:12

## 2022-12-08 RX ADMIN — ROCURONIUM BROMIDE 10 MG: 10 INJECTION INTRAVENOUS at 12:12

## 2022-12-08 RX ADMIN — EPHEDRINE SULFATE 10 MG: 50 INJECTION INTRAVENOUS at 11:12

## 2022-12-08 RX ADMIN — MIDAZOLAM HYDROCHLORIDE 2 MG: 1 INJECTION, SOLUTION INTRAMUSCULAR; INTRAVENOUS at 11:12

## 2022-12-08 RX ADMIN — SUGAMMADEX 200 MG: 100 INJECTION, SOLUTION INTRAVENOUS at 01:12

## 2022-12-08 NOTE — OP NOTE
Date of Procedure:  12/8/22     Attending Physician: Jasmyne Chaudhari MD     Assistant: Jessica Marquez MD     Pre-Operative Diagnosis: Senile ectropion of both lower eyelids [H02.132, H02.135]     Post-Operative Diagnosis: Same as pre-operative diagnosis     Treatments/Procedures:   Zygomaticofacial Midface Lift, bilateral (82713 ou)  Lateral tarsal strip, bilateral lower eyelids (17693 ou)     Intraoperative Findings: Facial ptosis, Ectropion, bilateral lower eyelids     Anesthesia: General with local     Complications: None     Estimated Blood Loss: < 5 cc     Specimens: None     Indications for surgery: Patient is a 67 y.o. female with history of bilateral lower eyelid ectropion with exposure and irritation left worse than right.  Extensive discussion of the risks/benefits/alternatives were discussed with the patient, including but not limited to the risk of pain, bleeding, infection, ocular injury, loss of the eye, asymmetry, need for revision in future, scarring. The patient was given the opportunity to ask questions and then elected to proceed. A consent document was signed, witnessed, and placed in the chart.     Procedure in detail:       The patient was brought to the operating room and placed in supine position.  A time out was performed including patient's name, date of birth, allergies, surgical site location, and anticipated procedure.  After adequate anesthesia was achieved, approximately 6 cc of local anesthetic (mixture of 2% lidocaine with 1:100,000 epinephrine, 0.5% bupivacaine, Vitrase) was injected around the lateral orbital rims, midface and the lateral aspects of the upper eyelids and lower eyelids bilaterally. The full face was prepped and draped in sterile fashion using topical Betadine.     A right lateral canthal incision was performed using a #15 scalpel. Grewal scissors were used to perform a canthotomy, and cantholysis. Hemostasis was maintained with electrocautery. Blunt dissection  using Grewal scissors and cotton tip applicators was performed to expose the periosteum of the lateral orbital rim. The same procedure was performed on the left side. Prior tot he lateral tarsal strip procedure, a midface lift was performed on both sides. The soft tissue was bluntly dissected in the the pre-periosteal plane until the midface was easily mobilized, with the zygomaticofacial  and infraorbital bundles identified and protected on both sides. Adequate soft tissue mobilization was confirmed.       A #15 scalpel was used to separate the anterior and posterior lamellae of the lateral edge of the right lower eyelid. Grewal scissors were used to trim the mucocutaneous junction. The conjunctival epithelium of the tarsal strip was denuded using the scalpel, and approximately 3 mm of the strip was trimmed using Grewal scissors. 5-0 Vicryl on a P-2 needle was used to bring the tarsal strip to the periosteum at the superior aspect of the lateral orbital rim. Two sutures were placed two to three millimeters posterior to the orbital rim, spaced two mm apart vertically at the lateral edge of the strip. The same procedure was repeated for the left lower eyelid. Excellent symmetry between the eyelids was ensured prior to tying down the tarsal sutures. The gray lines of the lateral upper and lower eyelids were reapposed using buried 6-0 vicryl on S-14 bilaterally.    Attention was directed to the right side, two interrupted buried 5-0 vicryl sutures were placed at the level of the periosteum at the lateral canthal raphe and then placed through the deep muscle of the midface and tied tightly elevating the midface. This was repeated for the left side.      The lateral canthal incisions were closed with a running 6-0 Prolene on a P-1 needle. Tobradex eyedrops were administered in both eyes. Tobradex ointment was applied to the eyes and suture lines bilaterally.      The patient tolerated the procedure well without  complications. The patient was awakened and transported to the recovery room in stable fashion. Post-operative instructions were discussed with the patient and family members.  All questions were answered.  The patient was discharged home in stable condition.

## 2022-12-08 NOTE — ANESTHESIA POSTPROCEDURE EVALUATION
Anesthesia Post Evaluation    Patient: Claudette Louise Voss    Procedure(s) Performed: Procedure(s) (LRB):  FLAP,ZYGOMATICOFACIAL W/PRESERVATION OF VASCULAR PEDICLE (Bilateral)  REPAIR, ECTROPION, EYELID (Bilateral)    Final Anesthesia Type: general      Patient location during evaluation: PACU  Patient participation: Yes- Able to Participate  Level of consciousness: awake and alert  Post-procedure vital signs: reviewed and stable  Pain management: adequate  Airway patency: patent    PONV status at discharge: No PONV  Anesthetic complications: no      Cardiovascular status: stable  Respiratory status: unassisted and spontaneous ventilation  Hydration status: euvolemic  Follow-up not needed.          Vitals Value Taken Time   /77 12/08/22 1501   Temp 36.6 °C (97.9 °F) 12/08/22 1344   Pulse 81 12/08/22 1513   Resp 20 12/08/22 1344   SpO2 94 % 12/08/22 1513   Vitals shown include unvalidated device data.      No case tracking events are documented in the log.      Pain/Ryan Score: Ryan Score: 10 (12/8/2022  2:15 PM)

## 2022-12-08 NOTE — ANESTHESIA PROCEDURE NOTES
Intubation    Date/Time: 12/8/2022 11:32 AM  Performed by: Lamar Goodwin CRNA  Authorized by: Brock Brown MD     Intubation:     Induction:  Intravenous    Intubated:  Postinduction    Mask Ventilation:  Easy mask    Attempts:  1    Attempted By:  CRNA    Method of Intubation:  Direct    Blade:  Stern 2    Laryngeal View Grade: Grade IIA - cords partially seen      Difficult Airway Encountered?: No      Complications:  None    Airway Device:  Oral moon    Airway Device Size:  7.0    Style/Cuff Inflation:  Cuffed (inflated to minimal occlusive pressure)    Secured at:  The lips    Placement Verified By:  Capnometry    Complicating Factors:  Small mouth    Findings Post-Intubation:  BS equal bilateral and atraumatic/condition of teeth unchanged

## 2022-12-08 NOTE — ANESTHESIA PREPROCEDURE EVALUATION
12/08/2022  Claudette Louise Voss is a 67 y.o., female.      Pre-op Assessment          Review of Systems  Anesthesia Hx:  No problems with previous Anesthesia    Social:  Non-Smoker, No Alcohol Use    Hematology/Oncology:        Hematology Comments: Chronic immunosuppression with Prograf and MMF Oncology Comments: Breast cancer - 2004    EENT/Dental:   Senile ectropion of both lower eyelids    Cardiovascular:   Hypertension    Pulmonary:   Sleep Apnea    Renal/:   Chronic Renal Disease PKD (polycystic kidney disease) - S/P nephrectomies  S/P kidney transplant   Endocrine:   Hypothyroidism  Obesity:  2015.      Physical Exam  General: Alert and Cooperative    Airway:  Mouth Opening: Normal  Tongue: Normal        Anesthesia Plan  Type of Anesthesia, risks & benefits discussed:    Anesthesia Type: Gen ETT  Intra-op Monitoring Plan: Standard ASA Monitors  Induction:  IV  Informed Consent: Informed consent signed with the Patient and all parties understand the risks and agree with anesthesia plan.  All questions answered.   ASA Score: 3  Day of Surgery Review of History & Physical: H&P Update referred to the surgeon/provider.    Ready For Surgery From Anesthesia Perspective.     .

## 2022-12-08 NOTE — TRANSFER OF CARE
"Anesthesia Transfer of Care Note    Patient: Claudette Louise Voss    Procedure(s) Performed: Procedure(s) (LRB):  FLAP,ZYGOMATICOFACIAL W/PRESERVATION OF VASCULAR PEDICLE (Bilateral)  REPAIR, ECTROPION, EYELID (Bilateral)    Patient location: PACU    Anesthesia Type: general    Transport from OR: Transported from OR on 6-10 L/min O2 by face mask with adequate spontaneous ventilation    Post pain: adequate analgesia    Post assessment: no apparent anesthetic complications and tolerated procedure well    Post vital signs: stable    Level of consciousness: responds to stimulation and sedated    Nausea/Vomiting: no nausea/vomiting    Complications: none    Transfer of care protocol was followed      Last vitals:   Visit Vitals  BP (!) 180/76 (BP Location: Left arm, Patient Position: Lying)   Pulse 63   Temp 37.1 °C (98.7 °F) (Temporal)   Resp 16   Ht 4' 11" (1.499 m)   Wt 68 kg (150 lb)   LMP  (LMP Unknown)   SpO2 96%   Breastfeeding No   BMI 30.30 kg/m²     "

## 2022-12-08 NOTE — DISCHARGE SUMMARY
Discharge Summary  Ophthalmology Service    Admit Date: 12/8/2022     Discharge Date: 12/8/2022     Attending Physician: Jasmyne Chaudhari MD     Discharge Physician: Jessica Marquez MD    Discharged Condition: Stable    Reason for Admission: Senile ectropion of both lower eyelids [H02.132, H02.135]  Facial ptosis [H02.409]  Ectropion [H02.109]     Treatments/Procedures: Procedure(s) (LRB):  FLAP,ZYGOMATICOFACIAL W/PRESERVATION OF VASCULAR PEDICLE (Bilateral)  REPAIR, ECTROPION, EYELID (Bilateral) (see dictated report for details).    Zygomaticofacial Midface Lift, bilateral  Lateral tarsal strip, bilateral lower eyelids    Hospital Course: Stable    Consults: None    Significant Diagnostic Studies: None    Disposition: Home    Patient Instructions:   - Resume same diet as prior to surgery  - Limit activity, no heavy lifting  - Call MD for severe uncontrolled pain, redness, and/or purulent drainage  - Follow up with Dr. Chaudhari at [facility], 10th floor, in 7-10 days - Please call clinic to schedule the above  - Use cold compresses twice a day for the first 48 hours after surgery; then use warm compresses twice a day for the following 48 hours  - Use Tobradex ointment twice a day on the surgical wounds. Apply using clean hands or a clean Q-tip  - Use Tobradex eye drops four times a day to both eyes    Patient Instructions:   Current Discharge Medication List        CONTINUE these medications which have NOT CHANGED    Details   atorvastatin (LIPITOR) 20 MG tablet Take 20 mg by mouth every morning.      brimonidine 0.15 % OPTH DROP (ALPHAGAN) 0.15 % ophthalmic solution Place 1 drop into the left eye 3 (three) times daily.      carvedilol (COREG) 6.25 MG tablet Take 1 tablet (6.25 mg total) by mouth 2 (two) times daily.  Qty: 180 tablet, Refills: 3    Comments: >>Kidney Transplant 1/16/15<<      cycloSPORINE-chondroit sulf A 0.1-0.25 % Drop Place 1 drop into both eyes 2 (two) times a day.  Qty: 16  mL, Refills: 3    Comments: 90-day Rx requested  Associated Diagnoses: Keratoconjunctivitis sicca      fluoxetine (PROZAC) 20 MG capsule Take 1 capsule (20 mg total) by mouth every evening.  Qty: 30 capsule, Refills: 5    Comments: >>Kidney Transplant 1/16/15<<      ketorolac 0.5% (ACULAR) 0.5 % Drop Place 1 drop into the right eye 2 (two) times daily. MWF one time each of those days      latanoprost (XALATAN) 0.005 % ophthalmic solution Place 1 drop into the left eye every evening.  Qty: 2.5 mL, Refills: 6    Associated Diagnoses: Primary open angle glaucoma (POAG) of left eye, severe stage      levothyroxine (SYNTHROID) 88 MCG tablet Take 88 mcg by mouth before breakfast.      mycophenolate (MYFORTIC) 180 MG TbEC Take 1 tablet (180 mg total) by mouth 2 (two) times daily.  Qty: 60 tablet, Refills: 11    Associated Diagnoses: Kidney replaced by transplant      nifedipine 30 MG ORAL TR24 (PROCARDIA-XL) 30 MG (OSM) 24 hr tablet Take 1 tablet (30 mg total) by mouth once daily.  Qty: 90 tablet, Refills: 4      omeprazole (PRILOSEC OTC) 20 MG tablet Take by mouth every morning.      tacrolimus (PROGRAF) 0.5 MG Cap Take 3 capsules (1.5 mg total) by mouth every morning AND 3 capsules (1.5 mg total) every evening.  Qty: 180 capsule, Refills: 11    Comments: Txp Date:1/16/2015 (Kidney) Disch Date:1/19/2015 ICD10:Z94.0 Txp Location:[facility]  Associated Diagnoses: Kidney replaced by transplant      artificial tears,hypromellose, (SYSTANE GEL) 0.3 % Gel Apply 2 drops to eye 3 (three) times daily as needed.  Refills: 0      lactobacillus acidophilus & bulgar (LACTINEX) 100 million cell packet Take 1 packet (1 each total) by mouth 2 (two) times daily.  Qty: 30 packet, Refills: 0      magnesium oxide (MAG-OX) 400 mg (241.3 mg magnesium) tablet Take 400 mg by mouth 2 (two) times daily.      !! pulse oximeter (PULSE OXIMETER) device by Apply Externally route 2 (two) times a day. Use twice daily at 8 AM and 3 PM and record the value in  MyChart as directed.  Qty: 1 each, Refills: 0    Comments: This is a NO CHARGE item.  Please override price to zero.  DO NOT PRINT.  NORMAL MODE e-PRESCRIBE ONLY.      !! pulse oximeter (PULSE OXIMETER) device by Apply Externally route 2 (two) times a day. Use twice daily at 8 AM and 3 PM and record the value in MyChart as directed.  Qty: 1 each, Refills: 0    Comments: This is a NO CHARGE item.  Please override price to zero.  DO NOT PRINT.  NORMAL MODE e-PRESCRIBE ONLY.      RESTASIS 0.05 % ophthalmic emulsion       vitamin D (VITAMIN D3) 1000 units Tab Take 2,000 Units by mouth once daily.       !! - Potential duplicate medications found. Please discuss with provider.          No discharge procedures on file.

## 2022-12-15 ENCOUNTER — OFFICE VISIT (OUTPATIENT)
Dept: OPHTHALMOLOGY | Facility: CLINIC | Age: 67
End: 2022-12-15
Payer: MEDICARE

## 2022-12-15 DIAGNOSIS — Z98.890 POST-OPERATIVE STATE: Primary | ICD-10-CM

## 2022-12-15 DIAGNOSIS — H02.132 SENILE ECTROPION OF BOTH LOWER EYELIDS: ICD-10-CM

## 2022-12-15 DIAGNOSIS — H44.2C3 BOTH EYES AFFECTED BY DEGENERATIVE MYOPIA WITH RETINAL DETACHMENT: ICD-10-CM

## 2022-12-15 DIAGNOSIS — H02.135 SENILE ECTROPION OF BOTH LOWER EYELIDS: ICD-10-CM

## 2022-12-15 DIAGNOSIS — H40.1133 PRIMARY OPEN ANGLE GLAUCOMA (POAG) OF BOTH EYES, SEVERE STAGE: ICD-10-CM

## 2022-12-15 DIAGNOSIS — H18.9 EXPOSURE KERATOPATHY: ICD-10-CM

## 2022-12-15 DIAGNOSIS — H02.409 FACIAL PTOSIS: ICD-10-CM

## 2022-12-15 DIAGNOSIS — H04.123 DRY EYE SYNDROME, BILATERAL: ICD-10-CM

## 2022-12-15 PROCEDURE — 99212 OFFICE O/P EST SF 10 MIN: CPT | Mod: PBBFAC | Performed by: OPHTHALMOLOGY

## 2022-12-15 PROCEDURE — 99999 PR PBB SHADOW E&M-EST. PATIENT-LVL II: CPT | Mod: PBBFAC,,, | Performed by: OPHTHALMOLOGY

## 2022-12-15 PROCEDURE — 92285 EXTERNAL OCULAR PHOTOGRAPHY: CPT | Mod: PBBFAC | Performed by: OPHTHALMOLOGY

## 2022-12-15 PROCEDURE — 99999 PR PBB SHADOW E&M-EST. PATIENT-LVL II: ICD-10-PCS | Mod: PBBFAC,,, | Performed by: OPHTHALMOLOGY

## 2022-12-15 PROCEDURE — 99024 POSTOP FOLLOW-UP VISIT: CPT | Mod: POP,,, | Performed by: OPHTHALMOLOGY

## 2022-12-15 PROCEDURE — 99024 PR POST-OP FOLLOW-UP VISIT: ICD-10-PCS | Mod: POP,,, | Performed by: OPHTHALMOLOGY

## 2022-12-15 NOTE — PROGRESS NOTES
HPI    Claudette Voss is a/an 67 y.o. female here for 1 week post op.  Date of procedure: 12/8/2022  Procedure: andrew canthoplasty + midface lift   Oral antibiotics: none   Eye meds: Ketorolac 4x a  week OD   Alphagan TID OS   Restasis BID OU   Latanoprost QHS OS   Refresh every hour OU   Maxitrol gtt qid   Maxitrol christa tid    Patient doing well following procedure. No pain or discomfort.     Last edited by Caitlin Del Rio on 12/15/2022  2:54 PM.            Assessment /Plan     For exam results, see Encounter Report.    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 Post-operative instructions reviewed. Sutures removed. All questions answered.  Return in 5 weeks prn sooner any worsening of vision/symptoms or any concerns.

## 2022-12-26 PROBLEM — J69.0 ASPIRATION PNEUMONIA OF RIGHT LOWER LOBE: Status: RESOLVED | Noted: 2022-09-20 | Resolved: 2022-12-26

## 2023-01-12 ENCOUNTER — OFFICE VISIT (OUTPATIENT)
Dept: OPHTHALMOLOGY | Facility: CLINIC | Age: 68
End: 2023-01-12
Payer: MEDICARE

## 2023-01-12 DIAGNOSIS — H35.351 CYSTOID MACULAR EDEMA, RIGHT: ICD-10-CM

## 2023-01-12 DIAGNOSIS — H35.721 RETINAL PIGMENT EPITHELIAL DETACHMENT, RIGHT: ICD-10-CM

## 2023-01-12 DIAGNOSIS — H04.123 DRY EYE SYNDROME, BILATERAL: ICD-10-CM

## 2023-01-12 DIAGNOSIS — H40.1133 PRIMARY OPEN ANGLE GLAUCOMA (POAG) OF BOTH EYES, SEVERE STAGE: ICD-10-CM

## 2023-01-12 DIAGNOSIS — Z96.1 PSEUDOPHAKIA OF BOTH EYES: ICD-10-CM

## 2023-01-12 DIAGNOSIS — H18.9 EXPOSURE KERATOPATHY: ICD-10-CM

## 2023-01-12 DIAGNOSIS — H44.2C3 BOTH EYES AFFECTED BY DEGENERATIVE MYOPIA WITH RETINAL DETACHMENT: Primary | ICD-10-CM

## 2023-01-12 PROCEDURE — 99999 PR PBB SHADOW E&M-EST. PATIENT-LVL III: CPT | Mod: PBBFAC,,, | Performed by: OPHTHALMOLOGY

## 2023-01-12 PROCEDURE — 99214 OFFICE O/P EST MOD 30 MIN: CPT | Mod: 24,S$PBB,, | Performed by: OPHTHALMOLOGY

## 2023-01-12 PROCEDURE — 99214 PR OFFICE/OUTPT VISIT, EST, LEVL IV, 30-39 MIN: ICD-10-PCS | Mod: 24,S$PBB,, | Performed by: OPHTHALMOLOGY

## 2023-01-12 PROCEDURE — 99213 OFFICE O/P EST LOW 20 MIN: CPT | Mod: PBBFAC | Performed by: OPHTHALMOLOGY

## 2023-01-12 PROCEDURE — 92134 CPTRZ OPH DX IMG PST SGM RTA: CPT | Mod: PBBFAC | Performed by: OPHTHALMOLOGY

## 2023-01-12 PROCEDURE — 92134 OCT, RETINA - OU - BOTH EYES: ICD-10-PCS | Mod: 26,S$PBB,, | Performed by: OPHTHALMOLOGY

## 2023-01-12 PROCEDURE — 99999 PR PBB SHADOW E&M-EST. PATIENT-LVL III: ICD-10-PCS | Mod: PBBFAC,,, | Performed by: OPHTHALMOLOGY

## 2023-01-12 NOTE — PROGRESS NOTES
HPI    DLS: 12/15/2022 Dr. Rendon    67 y.o. female is here for 3 months DFE/OCTm OU. Denies eye pain and   flashes/floaters. No noticeable [facility] changes since last visit. Do have   trouble with glare.     Eye Med's: Latanoprost qhs OS                     Brimonidine TID OS                     Restasis BID OU                      Ketorolac 3x weekly OD                     Refresh prn OU   Last edited by JUSTUS Abdalla on 1/12/2023  8:47 AM.          A/P    ICD-10-CM ICD-9-CM   1. 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 Cystoid macular edema, right  H35.351 362.53   3. Retinal pigment epithelial detachment, right  H35.721 362.42   4. Primary open angle glaucoma (POAG) of both eyes, severe stage  H40.1133 365.11     365.73   5. Pseudophakia of both eyes  Z96.1 V43.1   6. Exposure keratopathy  H18.9 371.40   7. Dry eye syndrome, bilateral  H04.123 375.15       1. 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 Cystoid macular edema, right  Hx myopic degeneration with RD, s/p repair OU  Retina flat and attached, prev was taken care of by Dr. David at     chronic CME OD (due to decentered IOL) has had inj and daily used acular BID with significant dry eye symptoms    Today using Acular 3x weekly, IRF incr slightly     Plan: incr back to 4x week Acular for control of recurrent IRF , will recheck 1 mo    3. Retinal pigment epithelial detachment, right  Mild PED no heme  Plan: Observation     4. Primary open angle glaucoma (POAG) of both eyes, severe stage  F/b Dr. Frias  IOP 9/19 (did not use Brim this AM)  Plan: Continue Present Management     5. Pseudophakia of both eyes  Decentered IOL OD, stable for now  OS is good position  Plan: Observation     6. Exposure keratopathy  7. Dry eye syndrome, bilateral  S/p andrew canthoplasty and midface lift w Dr Chaudhari  Improved  Still needs frequent Ats due to chronic ketorolac use    RTC Julieta - 1 month  DFE/OCTm OU      I saw and examined the patient and reviewed in detail the findings documented. The final examination findings, image interpretations, and plan as documented in the record represent my personal judgment and conclusions.    Guillermo Rendon MD  Vitreoretinal Surgery   [facility]

## 2023-01-25 DIAGNOSIS — Z94.0 KIDNEY REPLACED BY TRANSPLANT: ICD-10-CM

## 2023-01-26 DIAGNOSIS — Z94.0 STATUS POST DECEASED-DONOR KIDNEY TRANSPLANTATION: Primary | Chronic | ICD-10-CM

## 2023-01-26 DIAGNOSIS — Z94.0 KIDNEY REPLACED BY TRANSPLANT: ICD-10-CM

## 2023-01-26 RX ORDER — MYCOPHENOLIC ACID 180 MG/1
180 TABLET, DELAYED RELEASE ORAL 2 TIMES DAILY
Qty: 60 TABLET | Refills: 0 | Status: SHIPPED | OUTPATIENT
Start: 2023-01-26 | End: 2023-01-26 | Stop reason: SDUPTHER

## 2023-01-26 RX ORDER — MYCOPHENOLIC ACID 180 MG/1
180 TABLET, DELAYED RELEASE ORAL 2 TIMES DAILY
Qty: 60 TABLET | Refills: 1 | Status: SHIPPED | OUTPATIENT
Start: 2023-01-26 | End: 2023-04-10 | Stop reason: SDUPTHER

## 2023-01-26 RX ORDER — MYCOPHENOLIC ACID 180 MG/1
180 TABLET, DELAYED RELEASE ORAL 2 TIMES DAILY
Qty: 60 TABLET | Refills: 11 | Status: CANCELLED | OUTPATIENT
Start: 2023-01-26 | End: 2024-01-26

## 2023-02-08 NOTE — PROGRESS NOTES
Assessment /Plan     For exam results, see Encounter Report.    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  She states that she can get up in the middle of the night and can see as clearly as she possibly can.  She is not using her goggles at nighttime.  She has noticed an improvement in her tearing.  She is able to close her eyes.    Patient doing well! Post-operative instructions reviewed. All questions answered. Return prn.

## 2023-02-08 NOTE — SUBJECTIVE & OBJECTIVE
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 breast 8/25/2004    PKD (polycystic kidney disease) - S/P nephrectomies 11/29/2013    Renal hypertension        Past Surgical History:   Procedure Laterality Date    AV FISTULA PLACEMENT      multiple access     BREAST LUMPECTOMY  2004    CHOLECYSTECTOMY  1987    open    COLONOSCOPY      HERNIA REPAIR  2012    KIDNEY TRANSPLANT      LYMPH NODE DISSECTION  2004    with breast cancer treatment    NEPHRECTOMY  2012    bilateral    PARATHYROIDECTOMY Bilateral 12/23/2021    Procedure: PARATHYROIDECTOMY;  Surgeon: Gera Frank MD;  Location: [facility] OR [address] [place] [address];  Service: ENT;  Laterality: Bilateral;    SPLENECTOMY, TOTAL  2012       Review of patient's allergies indicates:   Allergen Reactions    Betamethasone acet,sod phos      Other reaction(s): Propensity to adverse reactions to drug    Cayenne Other (See Comments)    Acetaminophen Rash    Aspirin Rash    Vancomycin Rash    Yeast, dried Rash       No current facility-administered medications on file prior to encounter.     Current Outpatient Medications on File Prior to Encounter   Medication Sig    artificial tears,hypromellose, (SYSTANE GEL) 0.3 % Gel Apply 2 drops to eye 3 (three) times daily as needed.    atorvastatin (LIPITOR) 20 MG tablet Take 20 mg by mouth once daily.    brimonidine 0.15 % OPTH DROP (ALPHAGAN) 0.15 % ophthalmic solution Place 1 drop into the left eye 3 (three) times daily.    brimonidine 0.2% (ALPHAGAN) 0.2 % Drop Place 1 drop into the left eye 3 (three) times daily.     carvedilol (COREG) 6.25 MG tablet Take 1 tablet (6.25 mg total) by mouth 2 (two) times daily.    cycloSPORINE-chondroit sulf A 0.1-0.25 % Drop Place 1 drop into both eyes 2 (two) times a day.    fluoxetine (PROZAC) 20 MG capsule Take 1 capsule (20 mg total) by mouth every evening.    ketorolac 0.5% (ACULAR) 0.5 % Drop Place 1 drop into the right eye 2 (two) times daily.    latanoprost (XALATAN) 0.005 % ophthalmic solution Place 1 drop into the left eye every evening.    latanoprost 0.005 % ophthalmic solution Place 1 drop into the left eye every evening.    levothyroxine (SYNTHROID) 88 MCG tablet Take 88 mcg by mouth once daily.    magnesium oxide (MAG-OX) 400 mg (241.3 mg magnesium) tablet Take 400 mg by mouth 2 (two) times daily.    mycophenolate (MYFORTIC) 180 MG TbEC Take 1 tablet (180 mg total) by mouth 2 (two) times daily.    nifedipine 30 MG ORAL TR24 (PROCARDIA-XL) 30 MG (OSM) 24 hr tablet Take 1 tablet (30 mg total) by mouth once daily.    omeprazole (PRILOSEC OTC) 20 MG tablet Take 1 capsule orally 2 times a day.    RESTASIS 0.05 % ophthalmic emulsion     tacrolimus (PROGRAF) 0.5 MG Cap Take 3 capsules (1.5 mg total) by mouth every morning AND 3 capsules (1.5 mg total) every evening.    vitamin D (VITAMIN D3) 1000 units Tab Take 1,000 Units by mouth 2 (two) times daily.     Family History       Problem Relation (Age of Onset)    Diabetes Mother    Kidney disease Father, Sister    Stroke Mother          Tobacco Use    Smoking status: Never    Smokeless tobacco: Never   Substance and Sexual Activity    Alcohol use: No    Drug use: No    Sexual activity: Not on file     Review of Systems   Constitutional:  Negative for chills, fatigue, fever and unexpected weight change.   HENT:  Negative for congestion, ear pain, sore throat and trouble swallowing.    Eyes:  Negative for pain and visual disturbance.   Respiratory:  Positive for cough. Negative for chest tightness and shortness of breath.     Cardiovascular:  Negative for chest pain, palpitations and leg swelling.   Gastrointestinal:  Positive for nausea and vomiting. Negative for abdominal distention, abdominal pain, constipation and diarrhea.   Genitourinary:  Negative for difficulty urinating, dysuria, flank pain, frequency and hematuria.   Musculoskeletal:  Negative for back pain, gait problem, joint swelling, neck pain and neck stiffness.   Skin:  Negative for rash and wound.   Neurological:  Negative for dizziness, seizures, speech difficulty, light-headedness and headaches.   Objective:     Vital Signs (Most Recent):  Temp: 99.1 °F (37.3 °C) (09/20/22 0739)  Pulse: 88 (09/20/22 0732)  Resp: (!) 21 (09/20/22 0640)  BP: (!) 156/70 (09/20/22 0822)  SpO2: 98 % (09/20/22 0732)   Vital Signs (24h Range):  Temp:  [98.3 °F (36.8 °C)-99.1 °F (37.3 °C)] 99.1 °F (37.3 °C)  Pulse:  [] 88  Resp:  [17-22] 21  SpO2:  [94 %-99 %] 98 %  BP: (125-181)/() 156/70     Weight: 63.5 kg (140 lb)  Body mass index is 28.28 kg/m².    Physical Exam  Vitals and nursing note reviewed.   Constitutional:       Appearance: She is well-developed.   HENT:      Head: Normocephalic and atraumatic.   Cardiovascular:      Rate and Rhythm: Normal rate and regular rhythm.      Heart sounds: Normal heart sounds.   Pulmonary:      Effort: Pulmonary effort is normal.      Breath sounds: Normal breath sounds.   Abdominal:      General: Bowel sounds are normal.      Palpations: Abdomen is soft.      Tenderness: There is no abdominal tenderness.   Skin:     General: Skin is warm and dry.   Neurological:      Mental Status: She is alert and oriented to person, place, and time.   Psychiatric:         Behavior: Behavior normal.         Thought Content: Thought content normal.         Judgment: Judgment normal.           Significant Labs: All pertinent labs within the past 24 hours have been reviewed.  ABGs: No results for input(s): PH, PCO2, HCO3, POCSATURATED, BE, TOTALHB, COHB,  METHB, O2HB, POCFIO2, PO2 in the last 48 hours.  CBC:   Recent Labs   Lab 09/19/22  2223   WBC 11.52   HGB 13.0   HCT 38.5        CMP:   Recent Labs   Lab 09/19/22  2223      K 3.6      CO2 20*   *   BUN 25*   CREATININE 0.8   CALCIUM 9.3   PROT 7.3   ALBUMIN 4.1   BILITOT 0.6   ALKPHOS 59   AST 23   ALT 22   ANIONGAP 15     Lipase:   Recent Labs   Lab 09/19/22  2223   LIPASE 18     Troponin: No results for input(s): TROPONINI in the last 48 hours.  Urine Studies:   Recent Labs   Lab 09/19/22  2337   COLORU Yellow   APPEARANCEUA Clear   PHUR 6.0   SPECGRAV 1.020   PROTEINUA Negative   GLUCUA Negative   KETONESU Negative   BILIRUBINUA Negative   OCCULTUA Negative   NITRITE Negative   UROBILINOGEN Negative   LEUKOCYTESUR Negative       Significant Imaging: I have reviewed all pertinent imaging results/findings within the past 24 hours.  CXR: I have reviewed all pertinent results/findings within the past 24 hours and my personal findings are:       Monitoring EKG leads are present.  The trachea is unremarkable.  The cardiomediastinal silhouette is within normal limits.  There is no evidence of free air beneath the hemidiaphragms.  No pleural effusion.  There is no evidence of a pneumothorax.  There is no evidence of pneumomediastinum.  There is a right basilar opacity.     There is S-shaped scoliosis.  There are postoperative changes in the right upper abdominal quadrant.     Impression:     Right basilar opacity, suggestive of pneumonia.        15

## 2023-02-09 ENCOUNTER — OFFICE VISIT (OUTPATIENT)
Dept: OPHTHALMOLOGY | Facility: CLINIC | Age: 68
End: 2023-02-09
Payer: MEDICARE

## 2023-02-09 DIAGNOSIS — H40.1133 PRIMARY OPEN ANGLE GLAUCOMA (POAG) OF BOTH EYES, SEVERE STAGE: ICD-10-CM

## 2023-02-09 DIAGNOSIS — H04.123 DRY EYE SYNDROME, BILATERAL: ICD-10-CM

## 2023-02-09 DIAGNOSIS — H02.409 FACIAL PTOSIS: ICD-10-CM

## 2023-02-09 DIAGNOSIS — H44.2C3 BOTH EYES AFFECTED BY DEGENERATIVE MYOPIA WITH RETINAL DETACHMENT: ICD-10-CM

## 2023-02-09 DIAGNOSIS — H35.351 CYSTOID MACULAR EDEMA, RIGHT: ICD-10-CM

## 2023-02-09 DIAGNOSIS — H35.721 RETINAL PIGMENT EPITHELIAL DETACHMENT, RIGHT: ICD-10-CM

## 2023-02-09 DIAGNOSIS — H44.2C3 BOTH EYES AFFECTED BY DEGENERATIVE MYOPIA WITH RETINAL DETACHMENT: Primary | ICD-10-CM

## 2023-02-09 DIAGNOSIS — H02.135 SENILE ECTROPION OF BOTH LOWER EYELIDS: ICD-10-CM

## 2023-02-09 DIAGNOSIS — H18.9 EXPOSURE KERATOPATHY: ICD-10-CM

## 2023-02-09 DIAGNOSIS — Z96.1 PSEUDOPHAKIA OF BOTH EYES: ICD-10-CM

## 2023-02-09 DIAGNOSIS — H02.132 SENILE ECTROPION OF BOTH LOWER EYELIDS: ICD-10-CM

## 2023-02-09 DIAGNOSIS — Z98.890 POST-OPERATIVE STATE: Primary | ICD-10-CM

## 2023-02-09 PROCEDURE — 99024 POSTOP FOLLOW-UP VISIT: CPT | Mod: S$PBB,,, | Performed by: OPHTHALMOLOGY

## 2023-02-09 PROCEDURE — 99999 PR PBB SHADOW E&M-EST. PATIENT-LVL II: ICD-10-PCS | Mod: PBBFAC,,, | Performed by: OPHTHALMOLOGY

## 2023-02-09 PROCEDURE — 92134 OCT, RETINA - OU - BOTH EYES: ICD-10-PCS | Mod: 26,S$PBB,, | Performed by: OPHTHALMOLOGY

## 2023-02-09 PROCEDURE — 99213 OFFICE O/P EST LOW 20 MIN: CPT | Mod: PBBFAC | Performed by: OPHTHALMOLOGY

## 2023-02-09 PROCEDURE — 99024 PR POST-OP FOLLOW-UP VISIT: ICD-10-PCS | Mod: POP,,, | Performed by: OPHTHALMOLOGY

## 2023-02-09 PROCEDURE — 99024 PR POST-OP FOLLOW-UP VISIT: ICD-10-PCS | Mod: S$PBB,,, | Performed by: OPHTHALMOLOGY

## 2023-02-09 PROCEDURE — 92285 EXTERNAL OCULAR PHOTOGRAPHY: CPT | Mod: PBBFAC | Performed by: OPHTHALMOLOGY

## 2023-02-09 PROCEDURE — 99024 POSTOP FOLLOW-UP VISIT: CPT | Mod: POP,,, | Performed by: OPHTHALMOLOGY

## 2023-02-09 PROCEDURE — 99999 PR PBB SHADOW E&M-EST. PATIENT-LVL III: ICD-10-PCS | Mod: PBBFAC,,, | Performed by: OPHTHALMOLOGY

## 2023-02-09 PROCEDURE — 92134 CPTRZ OPH DX IMG PST SGM RTA: CPT | Mod: PBBFAC | Performed by: OPHTHALMOLOGY

## 2023-02-09 PROCEDURE — 99999 PR PBB SHADOW E&M-EST. PATIENT-LVL III: CPT | Mod: PBBFAC,,, | Performed by: OPHTHALMOLOGY

## 2023-02-09 PROCEDURE — 99212 OFFICE O/P EST SF 10 MIN: CPT | Mod: PBBFAC,27 | Performed by: OPHTHALMOLOGY

## 2023-02-09 PROCEDURE — 92285 EXTERNAL PHOTOGRAPHY - OU - BOTH EYES: ICD-10-PCS | Mod: 26,S$PBB,, | Performed by: OPHTHALMOLOGY

## 2023-02-09 PROCEDURE — 99999 PR PBB SHADOW E&M-EST. PATIENT-LVL II: CPT | Mod: PBBFAC,,, | Performed by: OPHTHALMOLOGY

## 2023-02-09 NOTE — PROGRESS NOTES
HPI    Dls: 01/12/2023    Pt here for 1 Month Dfe/Oct  States Va has no change. Denies Flashes, floaters, pain,photophobia  Complaint with gtts    Eye Meds:   Latanaprost Qhs Os   Brimonidine TID OS   Restasis Bid Ou   Ketorlac 3x weekly   Refresh Prn             Last edited by Penelope Hadley on 2/9/2023 11:02 AM.          A/P    ICD-10-CM ICD-9-CM   1. 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 Cystoid macular edema, right  H35.351 362.53   3. Retinal pigment epithelial detachment, right  H35.721 362.42   4. Primary open angle glaucoma (POAG) of both eyes, severe stage  H40.1133 365.11     365.73   5. Pseudophakia of both eyes  Z96.1 V43.1   6. Exposure keratopathy  H18.9 371.40   7. Dry eye syndrome, bilateral  H04.123 375.15         1. 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 Cystoid macular edema, right  Hx myopic degeneration with RD, s/p repair OU  Retina flat and attached, prev was taken care of by Dr. David at     chronic CME OD (due to decentered IOL) has had inj and daily used acular BID with significant dry eye symptoms    Today using Acular 4x weekly still, IRF has increased     Plan: Recommend to use Acular daily instead of 3-4x a week, will recheck 6 weeks    3. Retinal pigment epithelial detachment, right  Mild PED no heme stable  Plan: Observation     4. Primary open angle glaucoma (POAG) of both eyes, severe stage  F/b Dr. Frias  IOP 13/22 (did not use Brim this AM)  Plan: Continue Present Management     5. Pseudophakia of both eyes  Decentered IOL OD, stable for now  OS stable  Plan: Observation     6. Exposure keratopathy  7. Dry eye syndrome, bilateral  S/p andrew canthoplasty and midface lift w Dr Chaudhari  frequent Ats due to ketorolac use      RTC 6 weeks DFE/OCTm OU      I saw and examined the patient and reviewed in detail the findings documented. The final examination findings, image interpretations, and plan as  documented in the record represent my personal judgment and conclusions.    Guillermo Rendon MD  Vitreoretinal Surgery   [facility]

## 2023-03-07 NOTE — PROGRESS NOTES
"Subjective:       Patient ID: Claudette Louise Voss is a 67 y.o. female.    Chief Complaint: Glaucoma       HPI    DLS: 08/17/2022  Eyes dont itch as much anymore.     POAG   RPED OD   PC IOL OU   Bilateral Canthoplasty + midface lift 12/08/2022-[facility]    Keterolac QD OD   Brimonidine TID OS   Latanoprost Q HS OS   Restasis BID OU   Refresh every hour  Last edited by Lilo Frias MD on 3/8/2023 12:06 PM.              Assessment & Plan   Primary open angle glaucoma (POAG) of both eyes, severe stage  -     Discontinue: dorzolamide-timolol 2-0.5% (COSOPT) 22.3-6.8 mg/mL ophthalmic solution; Place 1 drop into the left eye 2 (two) times daily.  Dispense: 10 mL; Refill: 1  -     dorzolamide-timolol 2-0.5% (COSOPT) 22.3-6.8 mg/mL ophthalmic solution; Place 1 drop into the left eye 2 (two) times daily.  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 Baird's Note  " Pt has EXTENSIVE eye Hx:    -1980s: 16 cut RK OD/8 cut RK OS sp years of HCL wear  -2006: cat surgery OU  -2006: blepharoplasty OU w resultant GAEL/exposure K--hx plugs/sleep mask, tho pt does not wear now.  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  Both Dr David and Dr Marks said there was "nothing to be done", so pt is here for second opinion."      POAG severe OU  Anomalous optic nerve OU  -Fhx (), Steroids (),Trauma()  -Drops: Latanoprost qHS OS /-Drop " intolerance/contraindication:  -Laser: SLT x2 OS  -Surgeries: RD repair OU // CE IOL OU // RK OU  -CCT: 556 // 552  -Gonio: SS OU     IOP OS improved with change in drops - continue    5/22 RNFL dec TS/TI/NI OD // dec throughout OS    Change brimonidine to cosopt to decrease mid-day gtt  RTC 6 weeks IOP check       GAEL OU  Exposure keratopathy  S/p andrew canthoplasty and midface lift w Dr Watson RUIZ - recommend Refresh Plus - handout with picture provided OK for q1-2h OU  Cont Restasis BID OU --> $350 per month --> change to Klarity-C Imprimis -- sent to pharmacy, will be cheaper    Pseudophakia of both eyes  Decentered IOL OD, stable for now  Has seen Dr. Mcdaniel -  monitor    Degenerative myopia with retinal detachment OU  Hx myopic degeneration with RD, s/p repair OU  Retina flat and attached, prev was taken care of by Dr. David at   Now following with Dr. Rendon    Cystoid macular edema OD  F/b outside doc in past, chronic CME OD (due to decentered IOL) has had inj and daily used acular BID with significant dry eye symptoms  Now following with Dr. Rendon  Return of IRF with tapering - Continue Acular daily OD as managed by Dr. Rendon --> can consider bromfenac - more mild on K    Retinal pigment epithelial detachment OD  Mild PED no heme      PLAN  Continue Latanoprost qHS OS  Cont Klarity OU  Cont Ketorolac     Change brimonidine to cosopt BID OS to decrease mid-day gtt    RTC 6 weeks IOP check         Lilo Frias M.D., M.S.  Department of Ophthalmology   Division of Glaucoma Surgery  [facility]

## 2023-03-08 ENCOUNTER — OFFICE VISIT (OUTPATIENT)
Dept: OPHTHALMOLOGY | Facility: CLINIC | Age: 68
End: 2023-03-08
Payer: MEDICARE

## 2023-03-08 DIAGNOSIS — H40.1133 PRIMARY OPEN ANGLE GLAUCOMA (POAG) OF BOTH EYES, SEVERE STAGE: Primary | ICD-10-CM

## 2023-03-08 DIAGNOSIS — H44.2C3 BOTH EYES AFFECTED BY DEGENERATIVE MYOPIA WITH RETINAL DETACHMENT: ICD-10-CM

## 2023-03-08 DIAGNOSIS — Z96.1 PSEUDOPHAKIA OF BOTH EYES: ICD-10-CM

## 2023-03-08 DIAGNOSIS — H04.123 DRY EYE SYNDROME, BILATERAL: ICD-10-CM

## 2023-03-08 DIAGNOSIS — H35.351 CYSTOID MACULAR EDEMA, RIGHT: ICD-10-CM

## 2023-03-08 PROCEDURE — 99212 OFFICE O/P EST SF 10 MIN: CPT | Mod: PBBFAC | Performed by: STUDENT IN AN ORGANIZED HEALTH CARE EDUCATION/TRAINING PROGRAM

## 2023-03-08 PROCEDURE — 99999 PR PBB SHADOW E&M-EST. PATIENT-LVL II: ICD-10-PCS | Mod: PBBFAC,,, | Performed by: STUDENT IN AN ORGANIZED HEALTH CARE EDUCATION/TRAINING PROGRAM

## 2023-03-08 PROCEDURE — 92020 GONIOSCOPY: CPT | Mod: S$PBB,,, | Performed by: STUDENT IN AN ORGANIZED HEALTH CARE EDUCATION/TRAINING PROGRAM

## 2023-03-08 PROCEDURE — 99999 PR PBB SHADOW E&M-EST. PATIENT-LVL II: CPT | Mod: PBBFAC,,, | Performed by: STUDENT IN AN ORGANIZED HEALTH CARE EDUCATION/TRAINING PROGRAM

## 2023-03-08 PROCEDURE — 92020 GONIOSCOPY: CPT | Mod: PBBFAC | Performed by: STUDENT IN AN ORGANIZED HEALTH CARE EDUCATION/TRAINING PROGRAM

## 2023-03-08 PROCEDURE — 99024 PR POST-OP FOLLOW-UP VISIT: ICD-10-PCS | Mod: S$PBB,,, | Performed by: STUDENT IN AN ORGANIZED HEALTH CARE EDUCATION/TRAINING PROGRAM

## 2023-03-08 PROCEDURE — 92020 PR SPECIAL EYE EVAL,GONIOSCOPY: ICD-10-PCS | Mod: S$PBB,,, | Performed by: STUDENT IN AN ORGANIZED HEALTH CARE EDUCATION/TRAINING PROGRAM

## 2023-03-08 PROCEDURE — 99024 POSTOP FOLLOW-UP VISIT: CPT | Mod: S$PBB,,, | Performed by: STUDENT IN AN ORGANIZED HEALTH CARE EDUCATION/TRAINING PROGRAM

## 2023-03-08 RX ORDER — KETOROLAC TROMETHAMINE 5 MG/ML
1 SOLUTION OPHTHALMIC DAILY
Qty: 5 ML | Refills: 3 | Status: SHIPPED | OUTPATIENT
Start: 2023-03-08 | End: 2024-01-31 | Stop reason: SDUPTHER

## 2023-03-08 RX ORDER — DORZOLAMIDE HYDROCHLORIDE AND TIMOLOL MALEATE 20; 5 MG/ML; MG/ML
1 SOLUTION/ DROPS OPHTHALMIC 2 TIMES DAILY
Qty: 10 ML | Refills: 1 | Status: SHIPPED | OUTPATIENT
Start: 2023-03-08 | End: 2023-04-24 | Stop reason: SDUPTHER

## 2023-03-08 RX ORDER — DORZOLAMIDE HYDROCHLORIDE AND TIMOLOL MALEATE 20; 5 MG/ML; MG/ML
1 SOLUTION/ DROPS OPHTHALMIC 2 TIMES DAILY
Qty: 10 ML | Refills: 1 | Status: SHIPPED | OUTPATIENT
Start: 2023-03-08 | End: 2023-03-08 | Stop reason: SDUPTHER

## 2023-03-23 ENCOUNTER — OFFICE VISIT (OUTPATIENT)
Dept: OPHTHALMOLOGY | Facility: CLINIC | Age: 68
End: 2023-03-23
Payer: MEDICARE

## 2023-03-23 DIAGNOSIS — H40.1133 PRIMARY OPEN ANGLE GLAUCOMA (POAG) OF BOTH EYES, SEVERE STAGE: ICD-10-CM

## 2023-03-23 DIAGNOSIS — H44.2C3 BOTH EYES AFFECTED BY DEGENERATIVE MYOPIA WITH RETINAL DETACHMENT: Primary | ICD-10-CM

## 2023-03-23 DIAGNOSIS — H35.351 CYSTOID MACULAR EDEMA, RIGHT: ICD-10-CM

## 2023-03-23 DIAGNOSIS — H35.721 RETINAL PIGMENT EPITHELIAL DETACHMENT, RIGHT: ICD-10-CM

## 2023-03-23 PROCEDURE — 92134 OCT, RETINA - OU - BOTH EYES: ICD-10-PCS | Mod: 26,S$PBB,, | Performed by: OPHTHALMOLOGY

## 2023-03-23 PROCEDURE — 99213 OFFICE O/P EST LOW 20 MIN: CPT | Mod: PBBFAC | Performed by: OPHTHALMOLOGY

## 2023-03-23 PROCEDURE — 99214 OFFICE O/P EST MOD 30 MIN: CPT | Mod: S$PBB,,, | Performed by: OPHTHALMOLOGY

## 2023-03-23 PROCEDURE — 99999 PR PBB SHADOW E&M-EST. PATIENT-LVL III: CPT | Mod: PBBFAC,,, | Performed by: OPHTHALMOLOGY

## 2023-03-23 PROCEDURE — 92134 CPTRZ OPH DX IMG PST SGM RTA: CPT | Mod: PBBFAC | Performed by: OPHTHALMOLOGY

## 2023-03-23 PROCEDURE — 99999 PR PBB SHADOW E&M-EST. PATIENT-LVL III: ICD-10-PCS | Mod: PBBFAC,,, | Performed by: OPHTHALMOLOGY

## 2023-03-23 PROCEDURE — 99214 PR OFFICE/OUTPT VISIT, EST, LEVL IV, 30-39 MIN: ICD-10-PCS | Mod: S$PBB,,, | Performed by: OPHTHALMOLOGY

## 2023-03-23 NOTE — PROGRESS NOTES
HPI    VA OU no change since last visit.  DLS 3/8/23  Eyes dont itch as much anymore.     POAG   RPED OD   PC IOL OU   Bilateral Canthoplasty + midface lift 12/08/2022-[facility]     Keterolac QD OD   Cosopt BID OS   Latanoprost Q HS OS   Restasis BID OU   Refresh every hour  Last edited by Es King on 3/23/2023 11:44 AM.          A/P    ICD-10-CM ICD-9-CM   1. 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 Cystoid macular edema, right  H35.351 362.53   3. Retinal pigment epithelial detachment, right  H35.721 362.42   4. Primary open angle glaucoma (POAG) of both eyes, severe stage  H40.1133 365.11     365.73       1. 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 Cystoid macular edema, right  Hx myopic degeneration with RD, s/p repair OU  Retina flat and attached, prev was taken care of by Dr. David at     chronic CME OD (due to decentered IOL) has had inj and daily used acular BID with significant dry eye symptoms    Today using Acular daily, improved IRF    Plan: Recommend to use Acular daily , recheck in 3 mo    3. Retinal pigment epithelial detachment, right  Mild PED no heme   Plan: Observation     4. Primary open angle glaucoma (POAG) of both eyes, severe stage  F/b Dr. Frias  IOP 8/12  - switched to Cosopt from Brim  Plan: Continue Present Management     5. Pseudophakia of both eyes  Decentered IOL OD   OS stable  Plan: Observation     6. Exposure keratopathy  7. Dry eye syndrome, bilateral  S/p andrew canthoplasty and midface lift w Dr Chaudhari  frequent Ats due to ketorolac use      RTC 3 mo DFE/OCTm OU      I saw and examined the patient and reviewed in detail the findings documented. The final examination findings, image interpretations, and plan as documented in the record represent my personal judgment and conclusions.    Guillermo Rendon MD  Vitreoretinal Surgery   [facility]

## 2023-04-06 ENCOUNTER — TELEPHONE (OUTPATIENT)
Dept: TRANSPLANT | Facility: CLINIC | Age: 68
End: 2023-04-06
Payer: MEDICARE

## 2023-04-06 DIAGNOSIS — Z94.0 KIDNEY REPLACED BY TRANSPLANT: ICD-10-CM

## 2023-04-06 RX ORDER — MYCOPHENOLIC ACID 180 MG/1
180 TABLET, DELAYED RELEASE ORAL 2 TIMES DAILY
Qty: 180 TABLET | Refills: 2 | Status: CANCELLED | OUTPATIENT
Start: 2023-04-06

## 2023-04-06 NOTE — TELEPHONE ENCOUNTER
Return call to patient, stating that Dr Issa office has been faxing her IS RX to the pharmacy and the pharmacy at ,and has not been receiving them.  Spoke with [facility] and Pharmacy Fax # [phone].  Patient given the correct fax number to give Dr Issa.      ----- Message from Viky Land sent at 4/6/2023  1:57 PM CDT -----  Regarding: Refills  Contact: Pt  Pt is requesting a callback in regards to refilling medication. Pt stated she is having trouble getting refills and need some assistance. Please adv pt        Medication:   tacrolimus (PROGRAF) 0.5 MG Cap 180 capsule    mycophenolate (MYFORTIC) 180 MG TbEC 180 tablet         Confirmed contact below:   Contact Name:Claudette Voss  Phone Number: [phone]

## 2023-04-18 NOTE — PROGRESS NOTES
"Subjective:       Patient ID: Claudette Louise Voss is a 67 y.o. female.    Chief Complaint: Glaucoma (6 wk iop chk)     HPI     Glaucoma            Comments: 6 wk iop chk          Comments    Pt feels like her va has decreased in OD since her last visit. Things seem   to be more blurry.    POAG   RPED OD   PC IOL OU   Bilateral Canthoplasty + midface lift 12/08/2022-[facility]     Keterolac QD OD   Cosopt BID OS   Latanoprost Q HS OS   Restasis BID OU   Refresh PF every hour OU          Last edited by Randy Jefferson on 4/19/2023 11:07 AM.                  Assessment & Plan   Primary open angle glaucoma (POAG) of both eyes, severe stage  -     dorzolamide-timolol, PF, (COSOPT PF) 2-0.5 % Dpet ophthalmic solution; Place 1 drop into both eyes 2 (two) times daily.  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 Baird's Note  " Pt has EXTENSIVE eye Hx:    -1980s: 16 cut RK OD/8 cut RK OS sp years of HCL wear  -2006: cat surgery OU  -2006: blepharoplasty OU w resultant GAEL/exposure K--hx plugs/sleep mask, tho pt does not wear now.  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  Both Dr David and Dr Marks said there was "nothing to be done", so pt is here for second opinion."      POAG severe OU  Anomalous optic nerve OU  -Fhx (), Steroids (),Trauma()  -Drops: Continue Latanoprost qHS " OS// cosopt BID OS  -Drop intolerance/contraindication:  -Laser: SLT x2 OS  -Surgeries: RD repair OU // CE IOL OU // RK OU  -CCT: 556 // 552  -Gonio: SS OU     5/22 RNFL dec TS/TI/NI OD // dec throughout OS    Try cosopt PF BID OS      GAEL OU  Exposure keratopathy  S/p andrew canthoplasty and midface lift w Dr Watson RUIZ - recommend Refresh Plus - handout with picture provided OK for q1-2h OU  Klarity-C Imprimis --   Still quite dry     Pseudophakia of both eyes  Decentered IOL OD, stable for now  Has seen Dr. Mcdaniel -  monitor    Degenerative myopia with retinal detachment OU  Hx myopic degeneration with RD, s/p repair OU  Retina flat and attached, prev was taken care of by Dr. David at   Now following with Dr. Rendon    Cystoid macular edema OD  F/b outside doc in past, chronic CME OD (due to decentered IOL) has had inj and daily used acular BID with significant dry eye symptoms  Now following with Dr. Rendon  Return of IRF with tapering - Continue Acular daily OD as managed by Dr. Rendon --> can consider bromfenac - more mild on K    Retinal pigment epithelial detachment OD  Mild PED no heme      PLAN  Continue Latanoprost qHS OS// cosopt BID OS --> try Try cosopt PF BID OS  Cont Klarity OU  Cont Ketorolac     Try systaine hydration PF      RTC 3 months OCT-RNFL and IOP check         Lilo Frias M.D., M.S.  Department of Ophthalmology   Division of Glaucoma Surgery  [facility]

## 2023-04-19 ENCOUNTER — OFFICE VISIT (OUTPATIENT)
Dept: OPHTHALMOLOGY | Facility: CLINIC | Age: 68
End: 2023-04-19
Payer: MEDICARE

## 2023-04-19 DIAGNOSIS — H44.2C3 BOTH EYES AFFECTED BY DEGENERATIVE MYOPIA WITH RETINAL DETACHMENT: ICD-10-CM

## 2023-04-19 DIAGNOSIS — H35.721 RETINAL PIGMENT EPITHELIAL DETACHMENT, RIGHT: ICD-10-CM

## 2023-04-19 DIAGNOSIS — H40.1133 PRIMARY OPEN ANGLE GLAUCOMA (POAG) OF BOTH EYES, SEVERE STAGE: Primary | ICD-10-CM

## 2023-04-19 DIAGNOSIS — Z96.1 PSEUDOPHAKIA OF BOTH EYES: ICD-10-CM

## 2023-04-19 DIAGNOSIS — H18.9 EXPOSURE KERATOPATHY: ICD-10-CM

## 2023-04-19 DIAGNOSIS — H04.123 DRY EYE SYNDROME, BILATERAL: ICD-10-CM

## 2023-04-19 PROCEDURE — 99213 OFFICE O/P EST LOW 20 MIN: CPT | Mod: PBBFAC | Performed by: STUDENT IN AN ORGANIZED HEALTH CARE EDUCATION/TRAINING PROGRAM

## 2023-04-19 PROCEDURE — 99999 PR PBB SHADOW E&M-EST. PATIENT-LVL III: ICD-10-PCS | Mod: PBBFAC,,, | Performed by: STUDENT IN AN ORGANIZED HEALTH CARE EDUCATION/TRAINING PROGRAM

## 2023-04-19 PROCEDURE — 99214 OFFICE O/P EST MOD 30 MIN: CPT | Mod: S$PBB,,, | Performed by: STUDENT IN AN ORGANIZED HEALTH CARE EDUCATION/TRAINING PROGRAM

## 2023-04-19 PROCEDURE — 99214 PR OFFICE/OUTPT VISIT, EST, LEVL IV, 30-39 MIN: ICD-10-PCS | Mod: S$PBB,,, | Performed by: STUDENT IN AN ORGANIZED HEALTH CARE EDUCATION/TRAINING PROGRAM

## 2023-04-19 PROCEDURE — 99999 PR PBB SHADOW E&M-EST. PATIENT-LVL III: CPT | Mod: PBBFAC,,, | Performed by: STUDENT IN AN ORGANIZED HEALTH CARE EDUCATION/TRAINING PROGRAM

## 2023-04-19 RX ORDER — DORZOLAMIDE HYDROCHLORIDE AND TIMOLOL MALEATE PRESERVATIVE FREE 20; 5 MG/ML; MG/ML
1 SOLUTION/ DROPS OPHTHALMIC 2 TIMES DAILY
Qty: 60 EACH | Refills: 3 | Status: SHIPPED | OUTPATIENT
Start: 2023-04-19 | End: 2023-11-21

## 2023-07-18 ENCOUNTER — OFFICE VISIT (OUTPATIENT)
Dept: OPHTHALMOLOGY | Facility: CLINIC | Age: 68
End: 2023-07-18
Payer: MEDICARE

## 2023-07-18 DIAGNOSIS — H35.351 CYSTOID MACULAR EDEMA, RIGHT: ICD-10-CM

## 2023-07-18 DIAGNOSIS — Z96.1 PSEUDOPHAKIA OF BOTH EYES: ICD-10-CM

## 2023-07-18 DIAGNOSIS — H04.123 DRY EYE SYNDROME, BILATERAL: ICD-10-CM

## 2023-07-18 DIAGNOSIS — H40.1133 PRIMARY OPEN ANGLE GLAUCOMA (POAG) OF BOTH EYES, SEVERE STAGE: ICD-10-CM

## 2023-07-18 DIAGNOSIS — H18.9 EXPOSURE KERATOPATHY: ICD-10-CM

## 2023-07-18 DIAGNOSIS — H35.721 RETINAL PIGMENT EPITHELIAL DETACHMENT, RIGHT: ICD-10-CM

## 2023-07-18 DIAGNOSIS — H44.2C3 BOTH EYES AFFECTED BY DEGENERATIVE MYOPIA WITH RETINAL DETACHMENT: Primary | ICD-10-CM

## 2023-07-18 PROCEDURE — 99214 PR OFFICE/OUTPT VISIT, EST, LEVL IV, 30-39 MIN: ICD-10-PCS | Mod: S$PBB,,, | Performed by: OPHTHALMOLOGY

## 2023-07-18 PROCEDURE — 99213 OFFICE O/P EST LOW 20 MIN: CPT | Mod: PBBFAC | Performed by: OPHTHALMOLOGY

## 2023-07-18 PROCEDURE — 92134 CPTRZ OPH DX IMG PST SGM RTA: CPT | Mod: PBBFAC | Performed by: OPHTHALMOLOGY

## 2023-07-18 PROCEDURE — 99214 OFFICE O/P EST MOD 30 MIN: CPT | Mod: S$PBB,,, | Performed by: OPHTHALMOLOGY

## 2023-07-18 PROCEDURE — 92134 OCT, RETINA - OU - BOTH EYES: ICD-10-PCS | Mod: 26,S$PBB,, | Performed by: OPHTHALMOLOGY

## 2023-07-18 PROCEDURE — 99999 PR PBB SHADOW E&M-EST. PATIENT-LVL III: CPT | Mod: PBBFAC,,, | Performed by: OPHTHALMOLOGY

## 2023-07-18 PROCEDURE — 99999 PR PBB SHADOW E&M-EST. PATIENT-LVL III: ICD-10-PCS | Mod: PBBFAC,,, | Performed by: OPHTHALMOLOGY

## 2023-07-18 RX ORDER — BROMFENAC SODIUM 0.81 MG/ML
1 SOLUTION/ DROPS OPHTHALMIC DAILY
Qty: 5 ML | Refills: 3 | Status: SHIPPED | OUTPATIENT
Start: 2023-07-18 | End: 2024-01-31 | Stop reason: ALTCHOICE

## 2023-07-18 NOTE — PROGRESS NOTES
HPI    3 mo DFE/OCT OU   DLS- 03/23/2023 Dr. Rendon     Pt sts vision is stable, no change noticed.  Denies any eye pain     (-)Flashes (-)Floaters  (+)Photophobia  (+)Glare    EYEMEDS:  Ketorolac QD OD   Cosopt BID OS   Latanoprost QHS OS   Restasis BID OU   Refresh PRN     Last edited by Cierra Farmer on 7/18/2023 11:39 AM.          A/P    ICD-10-CM ICD-9-CM   1. 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 Cystoid macular edema, right  H35.351 362.53   3. Retinal pigment epithelial detachment, right  H35.721 362.42   4. Primary open angle glaucoma (POAG) of both eyes, severe stage  H40.1133 365.11     365.73   5. Pseudophakia of both eyes  Z96.1 V43.1   6. Dry eye syndrome, bilateral  H04.123 375.15   7. Exposure keratopathy  H18.9 371.40         1. 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 Cystoid macular edema, right  Hx myopic degeneration with RD, s/p repair OU  Retina flat and attached, prev was taken care of by Dr. David at     chronic CME OD (due to decentered IOL) has had inj and daily used acular BID with significant dry eye symptoms    Today 7/18/2023  using Acular daily, nearly resolved IRF, cornea with mild staining appears better today     Plan: Discussed switching to alternative topical NSAID like bromfenac, pt was on bromfenac in past but had problems with cost. We will replace prescription for bromfenac daily OD to give ease to corneal surface and control CME still. If insurance does not approve, we can decr Acular to every other day OD     3. Retinal pigment epithelial detachment, right  Mild PED no heme   Plan: Observation dry    4. Primary open angle glaucoma (POAG) of both eyes, severe stage  F/b Dr. Frias  IOP 6/11  - on cosopt   Plan: Continue cosopt BID OU     5. Pseudophakia of both eyes  Decentered IOL OD   OS stable  Plan: Observation     6. Exposure keratopathy  7. Dry eye syndrome, bilateral  S/p andrew  canthoplasty and midface lift w Dr Chaudhari  frequent Ats due to ketorolac use  Warm compresses, lid hygiene     RTC 3 mo DFE/OCTm OU      I saw and examined the patient and reviewed in detail the findings documented. The final examination findings, image interpretations, and plan as documented in the record represent my personal judgment and conclusions.    Guillermo Rendon MD  Vitreoretinal Surgery   [facility]

## 2023-07-19 ENCOUNTER — OFFICE VISIT (OUTPATIENT)
Dept: OPHTHALMOLOGY | Facility: CLINIC | Age: 68
End: 2023-07-19
Payer: MEDICARE

## 2023-07-19 DIAGNOSIS — Z96.1 PSEUDOPHAKIA OF BOTH EYES: ICD-10-CM

## 2023-07-19 DIAGNOSIS — H40.1133 PRIMARY OPEN ANGLE GLAUCOMA (POAG) OF BOTH EYES, SEVERE STAGE: Primary | ICD-10-CM

## 2023-07-19 DIAGNOSIS — H44.2C3 BOTH EYES AFFECTED BY DEGENERATIVE MYOPIA WITH RETINAL DETACHMENT: ICD-10-CM

## 2023-07-19 DIAGNOSIS — H04.123 DRY EYE SYNDROME, BILATERAL: ICD-10-CM

## 2023-07-19 PROCEDURE — 99213 OFFICE O/P EST LOW 20 MIN: CPT | Mod: PBBFAC | Performed by: STUDENT IN AN ORGANIZED HEALTH CARE EDUCATION/TRAINING PROGRAM

## 2023-07-19 PROCEDURE — 99999 PR PBB SHADOW E&M-EST. PATIENT-LVL III: ICD-10-PCS | Mod: PBBFAC,,, | Performed by: STUDENT IN AN ORGANIZED HEALTH CARE EDUCATION/TRAINING PROGRAM

## 2023-07-19 PROCEDURE — 99214 PR OFFICE/OUTPT VISIT, EST, LEVL IV, 30-39 MIN: ICD-10-PCS | Mod: S$PBB,,, | Performed by: STUDENT IN AN ORGANIZED HEALTH CARE EDUCATION/TRAINING PROGRAM

## 2023-07-19 PROCEDURE — 92133 CPTRZD OPH DX IMG PST SGM ON: CPT | Mod: PBBFAC | Performed by: STUDENT IN AN ORGANIZED HEALTH CARE EDUCATION/TRAINING PROGRAM

## 2023-07-19 PROCEDURE — 99214 OFFICE O/P EST MOD 30 MIN: CPT | Mod: S$PBB,,, | Performed by: STUDENT IN AN ORGANIZED HEALTH CARE EDUCATION/TRAINING PROGRAM

## 2023-07-19 PROCEDURE — 92133 POSTERIOR SEGMENT OCT OPTIC NERVE(OCULAR COHERENCE TOMOGRAPHY) - OU - BOTH EYES: ICD-10-PCS | Mod: 26,S$PBB,, | Performed by: STUDENT IN AN ORGANIZED HEALTH CARE EDUCATION/TRAINING PROGRAM

## 2023-07-19 PROCEDURE — 99999 PR PBB SHADOW E&M-EST. PATIENT-LVL III: CPT | Mod: PBBFAC,,, | Performed by: STUDENT IN AN ORGANIZED HEALTH CARE EDUCATION/TRAINING PROGRAM

## 2023-07-19 NOTE — PROGRESS NOTES
"Subjective:       Patient ID: Claudette Louise Voss is a 68 y.o. female.    Chief Complaint: Glaucoma    HPI    DLS: 4/19/2023    Pt here for 3 Month IOP Check;  Pt states no eye pain or discomfort.     Meds;  Latanoprost QHS OS --> using  Cosopt PF BID  OS --> doing PF cosopt in the AM and regular Cosopt in the   PM  Bromfenac 1 drop every other day OD per Dr. Rendon (replaces Ketorolac) -->   has not started  Restasis BID OU --> using the Klarity bid  Refresh PF 4-5 x DAY OU --> switching to Systaine next time  Last edited by Lilo Frias MD on 7/19/2023 12:05 PM.               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 Baird's Note  " Pt has EXTENSIVE eye Hx:    -1980s: 16 cut RK OD/8 cut RK OS sp years of HCL wear  -2006: cat surgery OU  -2006: blepharoplasty OU w resultant GAEL/exposure K--hx plugs/sleep mask, tho pt does not wear now.  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  Both Dr David and Dr Marks said there was "nothing to be done", so pt is here for second opinion."      POAG severe OU  Anomalous optic nerve OU  -Fhx (), Steroids (),Trauma()  -Drops: Continue Latanoprost qHS OS// cosopt-PF BID OS  -Drop intolerance/contraindication:  -Laser: SLT x2 OS  -Surgeries: RD repair OU // CE IOL OU // RK OU  -CCT: 556 // " 552  -Gonio: SS OU     7/23 RNFL dec TS/TI/NI OD // dec throughout OS --> stable x1 year    Use cosopt PF BID OS --> morning and night. Stop big bottle cosopt       GAEL OU  Exposure keratopathy  S/p andrew canthoplasty and midface lift w Dr Watson RUIZ - Try systaine hydration PF  Klarity-C Imprimis -- continue   Still quite dry   Discussed/considered  changing latanoprost to Xelpros -- too expensive     Pseudophakia of both eyes  Decentered IOL OD, stable for now  Has seen Dr. Mcdaniel -  monitor    Degenerative myopia with retinal detachment OU  Hx myopic degeneration with RD, s/p repair OU  Retina flat and attached, prev was taken care of by Dr. David at   Now following with Dr. Rendon    Cystoid macular edema OD  F/b outside doc in past, chronic CME OD (due to decentered IOL) has had inj and daily used acular BID with significant dry eye symptoms  Now following with Dr. Rendon  Return of IRF with tapering   Will try to use Bromfenac (Prolensa)      Retinal pigment epithelial detachment OD  Mild PED no heme      PLAN  Continue Latanoprost qHS OS (Discussed/considered  changing latanoprost to Xelpros -- too expensive )  Cosopt-PF BID OS  Stop big bottle cosopt     Cont Klarity OU  Use Bromfenac (Prolensa) daily     Systaine hydration PF - start after Refresh runs out       RTC 4 months IOP check       Lilo Frias M.D., M.S.  Department of Ophthalmology   Division of Glaucoma Surgery  [facility]

## 2023-10-19 ENCOUNTER — OFFICE VISIT (OUTPATIENT)
Dept: OPHTHALMOLOGY | Facility: CLINIC | Age: 68
End: 2023-10-19
Payer: MEDICARE

## 2023-10-19 DIAGNOSIS — Z96.1 PSEUDOPHAKIA OF BOTH EYES: ICD-10-CM

## 2023-10-19 DIAGNOSIS — H35.721 RETINAL PIGMENT EPITHELIAL DETACHMENT, RIGHT: ICD-10-CM

## 2023-10-19 DIAGNOSIS — H18.9 EXPOSURE KERATOPATHY: ICD-10-CM

## 2023-10-19 DIAGNOSIS — H04.123 DRY EYE SYNDROME, BILATERAL: ICD-10-CM

## 2023-10-19 DIAGNOSIS — H35.351 CYSTOID MACULAR EDEMA, RIGHT: Primary | ICD-10-CM

## 2023-10-19 DIAGNOSIS — H44.2C3 BOTH EYES AFFECTED BY DEGENERATIVE MYOPIA WITH RETINAL DETACHMENT: ICD-10-CM

## 2023-10-19 DIAGNOSIS — H40.1133 PRIMARY OPEN ANGLE GLAUCOMA (POAG) OF BOTH EYES, SEVERE STAGE: ICD-10-CM

## 2023-10-19 PROCEDURE — 92134 CPTRZ OPH DX IMG PST SGM RTA: CPT | Mod: PBBFAC | Performed by: OPHTHALMOLOGY

## 2023-10-19 PROCEDURE — 99214 OFFICE O/P EST MOD 30 MIN: CPT | Mod: S$PBB,,, | Performed by: OPHTHALMOLOGY

## 2023-10-19 PROCEDURE — 99999 PR PBB SHADOW E&M-EST. PATIENT-LVL III: CPT | Mod: PBBFAC,,, | Performed by: OPHTHALMOLOGY

## 2023-10-19 PROCEDURE — 99214 PR OFFICE/OUTPT VISIT, EST, LEVL IV, 30-39 MIN: ICD-10-PCS | Mod: S$PBB,,, | Performed by: OPHTHALMOLOGY

## 2023-10-19 PROCEDURE — 99999 PR PBB SHADOW E&M-EST. PATIENT-LVL III: ICD-10-PCS | Mod: PBBFAC,,, | Performed by: OPHTHALMOLOGY

## 2023-10-19 PROCEDURE — 92134 OCT, RETINA - OU - BOTH EYES: ICD-10-PCS | Mod: 26,S$PBB,, | Performed by: OPHTHALMOLOGY

## 2023-10-19 PROCEDURE — 99213 OFFICE O/P EST LOW 20 MIN: CPT | Mod: PBBFAC | Performed by: OPHTHALMOLOGY

## 2023-10-19 NOTE — PROGRESS NOTES
HPI    Last eye exam was 07/18/2023 with 07/19/2023 with Dr. Frias    Pt is here today for 3 months DFE/OCT M OU  Pt. States vision have remianed stable.  Pt. Denies floaters, flashes, pain or discomfort.    Gtts:     EYEMEDS:  Ketorolac QD OD   Cosopt BID OS   Latanoprost QHS OS   Restasis BID OU   Refresh PRN     Last edited by Megan Baum on 10/19/2023 11:20 AM.          A/P    ICD-10-CM ICD-9-CM   1. Cystoid macular edema, right  H35.351 362.53   2. 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 Retinal pigment epithelial detachment, right  H35.721 362.42   4. Primary open angle glaucoma (POAG) of both eyes, severe stage  H40.1133 365.11     365.73   5. Pseudophakia of both eyes  Z96.1 V43.1   6. Exposure keratopathy  H18.9 371.40   7. Dry eye syndrome, bilateral  H04.123 375.15       1. 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 Cystoid macular edema, right  Hx myopic degeneration with RD, s/p repair OU  Retina flat and attached, prev was taken care of by Dr. David at     chronic CME OD (due to decentered IOL) has had inj and daily used acular BID with significant dry eye symptoms    Today 10/19/2023  using Acular every other day, basically resolved IRF with rare cyst noted, cornea with mild staining - better on every other day Acualr usage    Plan: Given stability of IRF in better seeing eye and cornea is not dramatically worse, continue Acular every other day OD only and recheck in 4 mo     3. Retinal pigment epithelial detachment, right  Mild PED no heme   Plan: Observation dry no injection recommended no CNVM     4. Primary open angle glaucoma (POAG) of both eyes, severe stage  F/b Dr. Frias - recent clinic note reviewed  IOP 5/11  - on cosopt   Plan: Continue cosopt BID OU     5. Pseudophakia of both eyes  Decentered IOL OD   OS stable  Plan: Observation     6. Exposure keratopathy  7. Dry eye syndrome, bilateral  S/p andrew  canthoplasty and midface lift w Dr Chaudhari  Recommend frequent Ats due to ketorolac use  Warm compresses, lid hygiene     RTC 4 mo DFE/OCTm OU      I saw and examined the patient and reviewed in detail the findings documented. The final examination findings, image interpretations, and plan as documented in the record represent my personal judgment and conclusions.    Guillermo Rendon MD  Vitreoretinal Surgery   [facility]

## 2023-11-21 DIAGNOSIS — H40.1133 PRIMARY OPEN ANGLE GLAUCOMA (POAG) OF BOTH EYES, SEVERE STAGE: ICD-10-CM

## 2023-11-21 RX ORDER — DORZOLAMIDE HYDROCHLORIDE AND TIMOLOL MALEATE PRESERVATIVE FREE 20; 5 MG/ML; MG/ML
1 SOLUTION/ DROPS OPHTHALMIC 2 TIMES DAILY
Qty: 60 EACH | Refills: 12 | Status: SHIPPED | OUTPATIENT
Start: 2023-11-21 | End: 2024-01-31 | Stop reason: ALTCHOICE

## 2024-01-30 NOTE — PROGRESS NOTES
Subjective:       Patient ID: Claudette Louise Voss is a 68 y.o. female.    Chief Complaint: Glaucoma     HPI    Last eye exam was 10/19/2023 with Dr. Rendon, 07/19/2023 with Dr. Frias    68 y.o. female presents for IOP Check. Patient states vision OU appears   more blurred since seeing Dr. Rendon last year. Denies eye pain and   headaches. Patient does not like PF Cosopt, states vials are hard to open   and are empty by the time she gets it open.    Gtts:  Ketorolac QD OD   Cosopt PF BID OS   Latanoprost QHS OS   Restasis BID OU   Systane PRN     Last edited by Bridgette Llanos on 1/31/2024  2:08 PM.              Assessment & Plan   Primary open angle glaucoma (POAG) of both eyes, severe stage  -     chondroitin sulf A sodium, PF, (KLARITY, CHONDROITIN,, PF,) 0.25 % Drop; Place 1 drop into both eyes 2 (two) times a day.  Dispense: 10 mL; Refill: 12  -     Discontinue: ketorolac 0.5% (ACULAR) 0.5 % Drop; Place 1 drop into the right eye once daily.  Dispense: 5 mL; Refill: 3  -     Discontinue: dorzolamide-timolol 2-0.5% (COSOPT) 22.3-6.8 mg/mL ophthalmic solution; Place 1 drop into the left eye 2 (two) times daily.  Dispense: 10 mL; Refill: 6  -     Discontinue: ketorolac 0.5% (ACULAR) 0.5 % Drop; Place 1 drop into the right eye once daily.  Dispense: 5 mL; Refill: 3  -     Discontinue: RESTASIS 0.05 % ophthalmic emulsion; Place 1 drop into both eyes 2 (two) times daily.  Dispense: 60 each; Refill: 12  -     Discontinue: latanoprost 0.005 % ophthalmic solution; Place 1 drop into the left eye every evening.  Dispense: 2.5 mL; Refill: 6  -     dorzolamide-timolol 2-0.5% (COSOPT) 22.3-6.8 mg/mL ophthalmic solution; Place 1 drop into the left eye 2 (two) times daily.  Dispense: 10 mL; Refill: 6  -     latanoprost 0.005 % ophthalmic solution; Place 1 drop into the left eye every evening.  Dispense: 2.5 mL; Refill: 6  -     RESTASIS 0.05 % ophthalmic emulsion; Place 1 drop into both eyes 2 (two) times daily.  Dispense: 60 each;  Refill: 12  -     ketorolac 0.5% (ACULAR) 0.5 % Drop; Place 1 drop into the right eye once daily.  Dispense: 5 mL; Refill: 3    Cystoid macular edema, right  -     chondroitin sulf A sodium, PF, (KLARITY, CHONDROITIN,, PF,) 0.25 % Drop; Place 1 drop into both eyes 2 (two) times a day.  Dispense: 10 mL; Refill: 12  -     Discontinue: ketorolac 0.5% (ACULAR) 0.5 % Drop; Place 1 drop into the right eye once daily.  Dispense: 5 mL; Refill: 3  -     Discontinue: ketorolac 0.5% (ACULAR) 0.5 % Drop; Place 1 drop into the right eye once daily.  Dispense: 5 mL; Refill: 3  -     ketorolac 0.5% (ACULAR) 0.5 % Drop; Place 1 drop into the right eye once daily.  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  Dispense: 10 mL; Refill: 12    Retinal pigment epithelial detachment, right  -     chondroitin sulf A sodium, PF, (KLARITY, CHONDROITIN,, PF,) 0.25 % Drop; Place 1 drop into both eyes 2 (two) times a day.  Dispense: 10 mL; Refill: 12    Pseudophakia of both eyes  -     chondroitin sulf A sodium, PF, (KLARITY, CHONDROITIN,, PF,) 0.25 % Drop; Place 1 drop into both eyes 2 (two) times a day.  Dispense: 10 mL; Refill: 12    Dry eye  -     chondroitin sulf A sodium, PF, (KLARITY, CHONDROITIN,, PF,) 0.25 % Drop; Place 1 drop into both eyes 2 (two) times a day.  Dispense: 10 mL; Refill: 12    Primary open angle glaucoma (POAG) of left eye, severe stage  -     Discontinue: latanoprost 0.005 % ophthalmic solution; Place 1 drop into the left eye every evening.  Dispense: 2.5 mL; Refill: 6  -     Discontinue: dorzolamide-timolol 2-0.5% (COSOPT) 22.3-6.8 mg/mL ophthalmic solution; Place 1 drop into the left eye 2 (two) times daily.  Dispense: 10 mL; Refill: 6  -     Discontinue: ketorolac 0.5% (ACULAR) 0.5 % Drop; Place 1 drop into the right eye once daily.   "Dispense: 5 mL; Refill: 3  -     Discontinue: RESTASIS 0.05 % ophthalmic emulsion; Place 1 drop into both eyes 2 (two) times daily.  Dispense: 60 each; Refill: 12  -     Discontinue: latanoprost 0.005 % ophthalmic solution; Place 1 drop into the left eye every evening.  Dispense: 2.5 mL; Refill: 6  -     dorzolamide-timolol 2-0.5% (COSOPT) 22.3-6.8 mg/mL ophthalmic solution; Place 1 drop into the left eye 2 (two) times daily.  Dispense: 10 mL; Refill: 6  -     latanoprost 0.005 % ophthalmic solution; Place 1 drop into the left eye every evening.  Dispense: 2.5 mL; Refill: 6  -     RESTASIS 0.05 % ophthalmic emulsion; Place 1 drop into both eyes 2 (two) times daily.  Dispense: 60 each; Refill: 12  -     ketorolac 0.5% (ACULAR) 0.5 % Drop; Place 1 drop into the right eye once daily.  Dispense: 5 mL; Refill: 3    Other orders  -     Discontinue: RESTASIS 0.05 % ophthalmic emulsion; Place 1 drop into both eyes 2 (two) times daily.  Dispense: 60 each; Refill: 12  -     Discontinue: dorzolamide-timolol 2-0.5% (COSOPT) 22.3-6.8 mg/mL ophthalmic solution; Place 1 drop into the left eye 2 (two) times daily.  Dispense: 10 mL; Refill: 6      From Dr. Baird's Note  " Pt has EXTENSIVE eye Hx:    -1980s: 16 cut RK OD/8 cut RK OS sp years of HCL wear  -2006: cat surgery OU  -2006: blepharoplasty OU w resultant GAEL/exposure K--hx plugs/sleep mask, tho pt does not wear now.  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  Both Dr David and Dr Marks said there was "nothing to be done", " "so pt is here for second opinion."      POAG severe OU  Anomalous optic nerve OU  -Fhx (), Steroids (),Trauma()  -Drops: Continue Latanoprost qHS OS// cosopt-PF BID OS  -Drop intolerance/contraindication:  -Laser: SLT x2 OS  -Surgeries: RD repair OU // CE IOL OU // RK OU  -CCT: 556 // 552  -Gonio: SS OU     7/23 RNFL dec TS/TI/NI OD // dec throughout OS --> stable x1 year    Cosopt PF is getting too hard for her to do -- asks for regular cosopt OS -- OK  Will try [facility]       GAEL OU  Exposure keratopathy  S/p adnrew canthoplasty and midface lift w Dr Watson RUIZ - Try systaine hydration PF  Klarity-C Imprimis -- continue  Still quite dry   Discussed/considered  changing latanoprost to Xelpros -- too expensive     Pseudophakia of both eyes  Decentered IOL OD, stable for now  Has seen Dr. Mcdaniel -  monitor    Degenerative myopia with retinal detachment OU  Hx myopic degeneration with RD, s/p repair OU  Retina flat and attached, prev was taken care of by Dr. David at   Now following with Dr. Rendon    Cystoid macular edema OD  F/b outside doc in past, chronic CME OD (due to decentered IOL) has had inj and daily used acular BID with significant dry eye symptoms  Now following with Dr. Rendon  Return of IRF with tapering   Will try to use Bromfenac (Prolensa)      Retinal pigment epithelial detachment OD  Mild PED no heme      PLAN  Continue Latanoprost qHS OS (Discussed/considered  changing latanoprost to Xelpros -- too expensive )  Cosopt BID OS (big bottle)    Cont Klarity OU  Use ketorolac daily OS    Systaine hydration PF - start after Refresh runs out       RTC 4 months IOP check, OCT-RNFL        Lilo Frias M.D., M.S.  Department of Ophthalmology   Division of Glaucoma Surgery  [facility]    "

## 2024-01-31 ENCOUNTER — OFFICE VISIT (OUTPATIENT)
Dept: OPHTHALMOLOGY | Facility: CLINIC | Age: 69
End: 2024-01-31
Payer: MEDICARE

## 2024-01-31 DIAGNOSIS — H35.351 CYSTOID MACULAR EDEMA, RIGHT: ICD-10-CM

## 2024-01-31 DIAGNOSIS — H40.1133 PRIMARY OPEN ANGLE GLAUCOMA (POAG) OF BOTH EYES, SEVERE STAGE: Primary | ICD-10-CM

## 2024-01-31 DIAGNOSIS — H35.721 RETINAL PIGMENT EPITHELIAL DETACHMENT, RIGHT: ICD-10-CM

## 2024-01-31 DIAGNOSIS — Z96.1 PSEUDOPHAKIA OF BOTH EYES: ICD-10-CM

## 2024-01-31 DIAGNOSIS — H40.1123 PRIMARY OPEN ANGLE GLAUCOMA (POAG) OF LEFT EYE, SEVERE STAGE: ICD-10-CM

## 2024-01-31 DIAGNOSIS — H04.129 DRY EYE: ICD-10-CM

## 2024-01-31 DIAGNOSIS — H44.2C3 BOTH EYES AFFECTED BY DEGENERATIVE MYOPIA WITH RETINAL DETACHMENT: ICD-10-CM

## 2024-01-31 PROCEDURE — 99214 OFFICE O/P EST MOD 30 MIN: CPT | Mod: S$PBB,,, | Performed by: STUDENT IN AN ORGANIZED HEALTH CARE EDUCATION/TRAINING PROGRAM

## 2024-01-31 PROCEDURE — 99212 OFFICE O/P EST SF 10 MIN: CPT | Mod: PBBFAC | Performed by: STUDENT IN AN ORGANIZED HEALTH CARE EDUCATION/TRAINING PROGRAM

## 2024-01-31 PROCEDURE — 99999 PR PBB SHADOW E&M-EST. PATIENT-LVL II: CPT | Mod: PBBFAC,,, | Performed by: STUDENT IN AN ORGANIZED HEALTH CARE EDUCATION/TRAINING PROGRAM

## 2024-01-31 RX ORDER — CYCLOSPORINE 0.5 MG/ML
1 EMULSION OPHTHALMIC 2 TIMES DAILY
Qty: 60 EACH | Refills: 12 | Status: SHIPPED | OUTPATIENT
Start: 2024-01-31 | End: 2024-01-31 | Stop reason: SDUPTHER

## 2024-01-31 RX ORDER — DORZOLAMIDE HYDROCHLORIDE AND TIMOLOL MALEATE 20; 5 MG/ML; MG/ML
1 SOLUTION/ DROPS OPHTHALMIC 2 TIMES DAILY
Qty: 10 ML | Refills: 6 | Status: SHIPPED | OUTPATIENT
Start: 2024-01-31 | End: 2024-01-31 | Stop reason: SDUPTHER

## 2024-01-31 RX ORDER — KETOROLAC TROMETHAMINE 5 MG/ML
1 SOLUTION OPHTHALMIC DAILY
Qty: 5 ML | Refills: 3 | Status: SHIPPED | OUTPATIENT
Start: 2024-01-31 | End: 2024-01-31 | Stop reason: SDUPTHER

## 2024-01-31 RX ORDER — CYCLOSPORINE 0.5 MG/ML
1 EMULSION OPHTHALMIC 2 TIMES DAILY
Qty: 60 EACH | Refills: 12 | Status: SHIPPED | OUTPATIENT
Start: 2024-01-31 | End: 2024-05-01

## 2024-01-31 RX ORDER — CHONDROITIN SULF A SODIUM/PF 0.25 %
1 DROPS OPHTHALMIC (EYE) 2 TIMES DAILY
Qty: 10 ML | Refills: 12 | Status: SHIPPED | OUTPATIENT
Start: 2024-01-31 | End: 2025-01-30

## 2024-01-31 RX ORDER — DORZOLAMIDE HYDROCHLORIDE AND TIMOLOL MALEATE 20; 5 MG/ML; MG/ML
1 SOLUTION/ DROPS OPHTHALMIC 2 TIMES DAILY
Qty: 10 ML | Refills: 6 | Status: SHIPPED | OUTPATIENT
Start: 2024-01-31 | End: 2024-03-28 | Stop reason: SDUPTHER

## 2024-01-31 RX ORDER — LATANOPROST 50 UG/ML
1 SOLUTION/ DROPS OPHTHALMIC NIGHTLY
Qty: 2.5 ML | Refills: 6 | Status: SHIPPED | OUTPATIENT
Start: 2024-01-31 | End: 2024-01-31 | Stop reason: SDUPTHER

## 2024-01-31 RX ORDER — KETOROLAC TROMETHAMINE 5 MG/ML
1 SOLUTION OPHTHALMIC DAILY
Qty: 5 ML | Refills: 3 | Status: SHIPPED | OUTPATIENT
Start: 2024-01-31 | End: 2024-04-23

## 2024-01-31 RX ORDER — LATANOPROST 50 UG/ML
1 SOLUTION/ DROPS OPHTHALMIC NIGHTLY
Qty: 2.5 ML | Refills: 6 | Status: SHIPPED | OUTPATIENT
Start: 2024-01-31 | End: 2024-04-23

## 2024-02-19 NOTE — PROGRESS NOTES
HPI    4 mo DFE/OCTm OU      DLS 10/19/2023 by Dr. EDER Rendon MD    68 y.o. female here today and states vision OU appears more blurred since   seeing Dr. Rendon last year.     -eye pain   -diplopia  -flashes/floaters  ++headaches (due to elevated B/P )    Eye Meds: Ketorolac QD OD                     Cosopt PF BID OS                     Latanoprost QHS OS                     Systane PRN     POHx:    1. 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 Cystoid macular edema, right  Hx myopic degeneration with RD, s/p repair OU  Retina flat and attached, prev was taken care of by Dr. David at      chronic CME OD (due to decentered IOL) has had inj and daily used acular   BID with significant dry eye symptoms     Today 10/19/2023  using Acular every other day, basically resolved IRF   with rare cyst noted, cornea with mild staining - better on every other   day Acualr usage     Plan: Given stability of IRF in better seeing eye and cornea is not   dramatically worse, continue Acular every other day OD only and recheck in   4 mo      3. Retinal pigment epithelial detachment, right  Mild PED no heme   Plan: Observation dry no injection recommended no CNVM      4. Primary open angle glaucoma (POAG) of both eyes, severe stage  F/b Dr. Frias - recent clinic note reviewed  IOP 5/11  - on cosopt   Plan: Continue cosopt BID OU      5. Pseudophakia of both eyes  Decentered IOL OD   OS stable  Plan: Observation      6. Exposure keratopathy  7. Dry eye syndrome, bilateral  S/p andrew canthoplasty and midface lift w Dr Chaudhari  Recommend frequent Ats due to ketorolac use  Warm compresses, lid hygiene             Last edited by Charlene Johnson MA on 2/20/2024  1:22 PM.           A/P    ICD-10-CM ICD-9-CM   1. 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 Cystoid macular edema, right  H35.351 362.53   3. Retinal pigment epithelial detachment, right  H35.721 362.42   4.  Primary open angle glaucoma (POAG) of both eyes, severe stage  H40.1133 365.11     365.73   5. Pseudophakia of both eyes  Z96.1 V43.1   6. Dry eye syndrome, bilateral  H04.123 375.15   7. Exposure keratopathy  H18.9 371.40         1. 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 Cystoid macular edema, right  Hx myopic degeneration with RD, s/p repair OU  Retina flat and attached, prev was taken care of by Dr. David at     chronic CME OD (due to decentered IOL) has had inj and daily used acular BID with significant dry eye symptoms    Today 2/19/2024  using Acular every other day, worse IRF/SRF today    Plan: Given worse IRF/SRF, incr Acular to daily, recheck in 4-6 weeks, continue frequent lubrication     Visit today is associated with current or anticipated ongoing medical care related to this patients single serious condition/complex condition (cystoid macular edema right eye impacting vision, subluxed lens right eye )     3. Retinal pigment epithelial detachment, right  Mild PED no heme   Plan: Observation dry no injection recommended no CNVM     4. Primary open angle glaucoma (POAG) of both eyes, severe stage  F/b Dr. Frias - recent clinic note reviewed  IOP 5/16  - on cosopt OS, latan qHS OS  Plan: Continue cosopt OS, latan qHS OS    5. Pseudophakia of both eyes  Decentered IOL OD (pt had CPAP mask with magnet, has been possibly associated with affecting older IOL with metallic components and malpositioning)  OS stable  Plan: Observation for now, discussed option of IOL reposition, pt wishes to defer     6. Exposure keratopathy  7. Dry eye syndrome, bilateral  S/p andrew canthoplasty and midface lift w Dr Chaudhari  Recommend frequent Ats due to ketorolac use  Warm compresses, lid hygiene     RTC 4-6 weeks DFE/OCTm OU, monitor IRF        I saw and examined the patient and reviewed in detail the findings documented. The final examination findings, image interpretations, and plan as documented  in the record represent my personal judgment and conclusions.    Guillermo Rendon MD  Vitreoretinal Surgery   [facility]

## 2024-02-20 ENCOUNTER — OFFICE VISIT (OUTPATIENT)
Dept: OPHTHALMOLOGY | Facility: CLINIC | Age: 69
End: 2024-02-20
Payer: MEDICARE

## 2024-02-20 DIAGNOSIS — Z96.1 PSEUDOPHAKIA OF BOTH EYES: ICD-10-CM

## 2024-02-20 DIAGNOSIS — H35.351 CYSTOID MACULAR EDEMA, RIGHT: ICD-10-CM

## 2024-02-20 DIAGNOSIS — H18.9 EXPOSURE KERATOPATHY: ICD-10-CM

## 2024-02-20 DIAGNOSIS — H44.2C3 BOTH EYES AFFECTED BY DEGENERATIVE MYOPIA WITH RETINAL DETACHMENT: Primary | ICD-10-CM

## 2024-02-20 DIAGNOSIS — H35.721 RETINAL PIGMENT EPITHELIAL DETACHMENT, RIGHT: ICD-10-CM

## 2024-02-20 DIAGNOSIS — H04.123 DRY EYE SYNDROME, BILATERAL: ICD-10-CM

## 2024-02-20 DIAGNOSIS — H40.1133 PRIMARY OPEN ANGLE GLAUCOMA (POAG) OF BOTH EYES, SEVERE STAGE: ICD-10-CM

## 2024-02-20 PROCEDURE — 99214 OFFICE O/P EST MOD 30 MIN: CPT | Mod: S$PBB,,, | Performed by: OPHTHALMOLOGY

## 2024-02-20 PROCEDURE — 99213 OFFICE O/P EST LOW 20 MIN: CPT | Mod: PBBFAC,25 | Performed by: OPHTHALMOLOGY

## 2024-02-20 PROCEDURE — 92134 CPTRZ OPH DX IMG PST SGM RTA: CPT | Mod: PBBFAC | Performed by: OPHTHALMOLOGY

## 2024-02-20 PROCEDURE — G2211 COMPLEX E/M VISIT ADD ON: HCPCS | Mod: S$PBB,,, | Performed by: OPHTHALMOLOGY

## 2024-02-20 PROCEDURE — 99999 PR PBB SHADOW E&M-EST. PATIENT-LVL III: CPT | Mod: PBBFAC,,, | Performed by: OPHTHALMOLOGY

## 2024-02-20 RX ORDER — AMLODIPINE BESYLATE 5 MG/1
5 TABLET ORAL DAILY
COMMUNITY
Start: 2024-02-06

## 2024-02-20 RX ORDER — LEVOTHYROXINE SODIUM 50 UG/1
50 TABLET ORAL
COMMUNITY

## 2024-03-20 RX ORDER — CYCLOSPORINE OPHTHALMIC SOLUTION 1 MG/ML
1 SOLUTION/ DROPS OPHTHALMIC 2 TIMES DAILY
Qty: 2 ML | Refills: 6 | Status: SHIPPED | OUTPATIENT
Start: 2024-03-20 | End: 2024-05-01 | Stop reason: ALTCHOICE

## 2024-03-20 NOTE — TELEPHONE ENCOUNTER
----- Message from Margarita Oconnor MA sent at 3/20/2024  1:09 PM CDT -----  Regarding: FW: appointment access  Contact: [phone]    ----- Message -----  From: Jason Hernandez  Sent: 3/20/2024  11:46 AM CDT  To: [facility] Staff  Subject: appointment access                               Pt is calling to schedule an appointment with  . Their was no appointments available . Please contact pt

## 2024-03-28 ENCOUNTER — OFFICE VISIT (OUTPATIENT)
Dept: OPHTHALMOLOGY | Facility: CLINIC | Age: 69
End: 2024-03-28
Payer: MEDICARE

## 2024-03-28 DIAGNOSIS — H35.351 CYSTOID MACULAR EDEMA, RIGHT: ICD-10-CM

## 2024-03-28 DIAGNOSIS — Z96.1 PSEUDOPHAKIA OF BOTH EYES: ICD-10-CM

## 2024-03-28 DIAGNOSIS — H35.721 RETINAL PIGMENT EPITHELIAL DETACHMENT, RIGHT: ICD-10-CM

## 2024-03-28 DIAGNOSIS — H18.9 EXPOSURE KERATOPATHY: ICD-10-CM

## 2024-03-28 DIAGNOSIS — H40.1123 PRIMARY OPEN ANGLE GLAUCOMA (POAG) OF LEFT EYE, SEVERE STAGE: ICD-10-CM

## 2024-03-28 DIAGNOSIS — H44.2C3 BOTH EYES AFFECTED BY DEGENERATIVE MYOPIA WITH RETINAL DETACHMENT: Primary | ICD-10-CM

## 2024-03-28 DIAGNOSIS — H40.1133 PRIMARY OPEN ANGLE GLAUCOMA (POAG) OF BOTH EYES, SEVERE STAGE: ICD-10-CM

## 2024-03-28 DIAGNOSIS — H04.123 DRY EYE SYNDROME, BILATERAL: ICD-10-CM

## 2024-03-28 PROCEDURE — G2211 COMPLEX E/M VISIT ADD ON: HCPCS | Mod: S$PBB,,, | Performed by: OPHTHALMOLOGY

## 2024-03-28 PROCEDURE — 99999 PR PBB SHADOW E&M-EST. PATIENT-LVL III: CPT | Mod: PBBFAC,,, | Performed by: OPHTHALMOLOGY

## 2024-03-28 PROCEDURE — 92134 CPTRZ OPH DX IMG PST SGM RTA: CPT | Mod: PBBFAC | Performed by: OPHTHALMOLOGY

## 2024-03-28 PROCEDURE — 92202 OPSCPY EXTND ON/MAC DRAW: CPT | Mod: 59,PBBFAC | Performed by: OPHTHALMOLOGY

## 2024-03-28 PROCEDURE — 99214 OFFICE O/P EST MOD 30 MIN: CPT | Mod: S$PBB,,, | Performed by: OPHTHALMOLOGY

## 2024-03-28 PROCEDURE — 92202 OPSCPY EXTND ON/MAC DRAW: CPT | Mod: 59,S$PBB,, | Performed by: OPHTHALMOLOGY

## 2024-03-28 PROCEDURE — 99213 OFFICE O/P EST LOW 20 MIN: CPT | Mod: PBBFAC,25 | Performed by: OPHTHALMOLOGY

## 2024-03-28 RX ORDER — DORZOLAMIDE HYDROCHLORIDE AND TIMOLOL MALEATE 20; 5 MG/ML; MG/ML
1 SOLUTION/ DROPS OPHTHALMIC 2 TIMES DAILY
Qty: 10 ML | Refills: 6 | Status: SHIPPED | OUTPATIENT
Start: 2024-03-28 | End: 2024-04-23

## 2024-03-28 NOTE — PROGRESS NOTES
HPI    DLS: 2/20/24    Pt here for 6 week DFE and OCT.  Pt lost her bottles of Cosopt and ran out   today. Pt denies flashes, floaters, headaches or eye pain OU.     Ketorolac QD OD   Cosopt PF BID OS   Latanoprost QHS OS   Systane PRN     POHx:    1. 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 Cystoid macular edema, right   Hx myopic dege neration with RD, s/p repair OU   Retina flat and attached, prev was taken care of by Dr. David at       chronic CME OD (due to decentered IOL) has had inj and daily used acular   BID with significant dry eye symptoms      Today 10/19/2023  using Acular ever y other day, basically resolved IRF   with rare cyst noted, cornea with mild staining - better on every other   day Acualr usage      Plan: Given stability of IRF in better seeing eye and cornea is not   dramatically worse, continue Acular every other day OD on ly and recheck   in 4 mo      3. Retinal pigment epithelial detachment, right   Mild PED no heme   Plan: Observation dry no injection recommended no CNVM      4. Primary open angle glaucoma (POAG) of both eyes, severe stage   F/b Dr. Frias - recent clinic no te reviewed   IOP 5/11  - on cosopt   Plan: Continue cosopt BID OU      5. Pseudophakia of both eyes   Decentered IOL OD   OS stable   Plan: Observation      6. Exposure keratopathy   7. Dry eye syndrome, bilateral   S/p andrew canthoplasty and midface lift w Dr Chaudhari   Recommend frequent Ats due to ketorolac use   Warm compresses, lid hygiene     Last edited by Terri Castaneda MA on 3/28/2024 12:46 PM.    HPI    DLS: 2/20/24    Pt here for 6 week DFE and OCT.  Pt lost her bottles of Cosopt and ran out   today. Pt denies flashes, floaters, headaches or eye pain OU.     Ketorolac QD OD   Cosopt PF BID OS   Latanoprost QHS OS   Systane PRN     POHx:    1. 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 Cystoid macular edema, right   Hx myopic dege neration with  RD, s/p repair OU   Retina flat and attached, prev was taken care of by Dr. David at       chronic CME OD (due to decentered IOL) has had inj and daily used acular   BID with significant dry eye symptoms      Today 10/19/2023  using Acular ever y other day, basically resolved IRF   with rare cyst noted, cornea with mild staining - better on every other   day Acualr usage      Plan: Given stability of IRF in better seeing eye and cornea is not   dramatically worse, continue Acular every other day OD on ly and recheck   in 4 mo      3. Retinal pigment epithelial detachment, right   Mild PED no heme   Plan: Observation dry no injection recommended no CNVM      4. Primary open angle glaucoma (POAG) of both eyes, severe stage   F/b Dr. Frias - recent clinic no te reviewed   IOP 5/11  - on cosopt   Plan: Continue cosopt BID OU      5. Pseudophakia of both eyes   Decentered IOL OD   OS stable   Plan: Observation      6. Exposure keratopathy   7. Dry eye syndrome, bilateral   S/p andrew canthoplasty and midface lift w Dr Chaudhari   Recommend frequent Ats due to ketorolac use   Warm compresses, lid hygiene     Last edited by Terri Castaneda MA on 3/28/2024 12:46 PM.           A/P    ICD-10-CM ICD-9-CM   1. 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 Cystoid macular edema, right  H35.351 362.53   3. Retinal pigment epithelial detachment, right  H35.721 362.42   4. Primary open angle glaucoma (POAG) of both eyes, severe stage  H40.1133 365.11     365.73   5. Pseudophakia of both eyes  Z96.1 V43.1   6. Dry eye syndrome, bilateral  H04.123 375.15   7. Exposure keratopathy  H18.9 371.40           1. 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 Cystoid macular edema, right  Hx myopic degeneration with RD, s/p repair OU  Retina flat and attached, prev was taken care of by Dr. David at     chronic CME OD (due to decentered IOL) has had inj and daily  used acular BID with significant dry eye symptoms    Today 3/28/2024  using Acular daily, tr improvement IRF/SRF today    Plan: Given still with IRF/SRF, will incr Acular to BID and recheck in 4-6 weeks    Visit today is associated with current or anticipated ongoing medical care related to this patients single serious condition/complex condition (cystoid macular edema right eye impacting vision, subluxed lens right eye )     3. Retinal pigment epithelial detachment, right  Mild PED no heme   Plan: Observation dry no injection recommended no CNVM     4. Primary open angle glaucoma (POAG) of both eyes, severe stage  F/b Dr. Frias - recent clinic note reviewed  IOP 7/18  - on cosopt OS, latan qHS OS  Plan: Continue cosopt OS, latan qHS OS    5. Pseudophakia of both eyes  Decentered IOL OD (pt had CPAP mask with magnet, has been possibly associated with affecting older IOL with metallic components and malpositioning)  OS stable  Plan: Observation for now, discussed option of IOL reposition, pt wishes to defer     6. Exposure keratopathy  7. Dry eye syndrome, bilateral  S/p andrew canthoplasty and midface lift w Dr Chaudhari  Recommend frequent Ats due to ketorolac use  Warm compresses, lid hygiene     RTC 4-6 weeks DFE/OCTm OU, monitor IRF        I saw and examined the patient and reviewed in detail the findings documented. The final examination findings, image interpretations, and plan as documented in the record represent my personal judgment and conclusions.    Guillermo Rendon MD  Vitreoretinal Surgery   [facility]

## 2024-04-23 DIAGNOSIS — H35.351 CYSTOID MACULAR EDEMA, RIGHT: ICD-10-CM

## 2024-04-23 DIAGNOSIS — H40.1133 PRIMARY OPEN ANGLE GLAUCOMA (POAG) OF BOTH EYES, SEVERE STAGE: ICD-10-CM

## 2024-04-23 DIAGNOSIS — H40.1123 PRIMARY OPEN ANGLE GLAUCOMA (POAG) OF LEFT EYE, SEVERE STAGE: ICD-10-CM

## 2024-04-23 RX ORDER — KETOROLAC TROMETHAMINE 5 MG/ML
1 SOLUTION OPHTHALMIC ONCE
Qty: 10 ML | Refills: 6 | Status: SHIPPED | OUTPATIENT
Start: 2024-04-23 | End: 2024-04-23

## 2024-04-23 RX ORDER — DORZOLAMIDE HYDROCHLORIDE AND TIMOLOL MALEATE 20; 5 MG/ML; MG/ML
1 SOLUTION/ DROPS OPHTHALMIC 2 TIMES DAILY
Qty: 10 ML | Refills: 6 | Status: SHIPPED | OUTPATIENT
Start: 2024-04-23

## 2024-04-23 RX ORDER — LATANOPROST 50 UG/ML
1 SOLUTION/ DROPS OPHTHALMIC NIGHTLY
Qty: 2.5 ML | Refills: 6 | Status: SHIPPED | OUTPATIENT
Start: 2024-04-23

## 2024-04-30 NOTE — PROGRESS NOTES
"Subjective:       Patient ID: Claudette Louise Voss is a 68 y.o. female.    Chief Complaint: Glaucoma    HPI    DLS: 1/31/2024    Pt here for 3 Month IOP Check;  Pt states no eye pain or discomfort just can't see well.     Meds;  Ketorolac BID OD   Dorzolamide-Timolol BID OS   Latanoprost QHS OS   Systane PRN OU    Last edited by Alexandria Crawford on 5/1/2024  2:01 PM.               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 Baird's Note  " Pt has EXTENSIVE eye Hx:    -1980s: 16 cut RK OD/8 cut RK OS sp years of HCL wear  -2006: cat surgery OU  -2006: blepharoplasty OU w resultant GAEL/exposure K--hx plugs/sleep mask, tho pt does not wear now.  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  Both Dr David and Dr Marks said there was "nothing to be done", so pt is here for second opinion."      POAG severe OU  Anomalous optic nerve OU  -Fhx (), Steroids (),Trauma()  -Drops: Latanoprost qHS OS// cosopt BID OS  -Drop intolerance/contraindication:  -Laser: SLT x2 OS  -Surgeries: RD repair OU // CE IOL OU // RK OU  -CCT: 556 // 552  -Gonio: SS OU     7/23 RNFL dec TS/TI/NI OD // dec throughout OS --> stable x1 year    Cosopt PF is getting too hard for her to do -- asks for regular cosopt OS -- " OK  Will try [facility]       GAEL OU  Exposure keratopathy  S/p andrew canthoplasty and midface lift w Dr Watson RUIZ - Try systaine hydration PF  [employer] -- she can't use -- can't get in touch with them  Try xiidra or restasis - see if it can be affordable - BID OU    Pseudophakia of both eyes  Decentered IOL OD, stable for now  Has seen Dr. Mcdaniel -  monitor    Degenerative myopia with retinal detachment OU  Hx myopic degeneration with RD, s/p repair OU  Retina flat and attached, prev was taken care of by Dr. David at   Now following with Dr. Rendon    Cystoid macular edema OD  F/b outside doc in past, chronic CME OD (due to decentered IOL) has had inj and daily used acular BID with significant dry eye symptoms  Now following with Dr. Rendon  Return of IRF with tapering   Will try to use Bromfenac (Prolensa)      Retinal pigment epithelial detachment OD  Mild PED no heme      PLAN  Continue Latanoprost qHS OS (Discussed/considered  changing latanoprost to Xelpros -- too expensive )  Cosopt BID OS (big bottle)    Try xiidra or restasis - see if it can be affordable - BID OU    Cont Klarity OU  Ketorolac per Dr. Rendon     Systaine hydration PF - start after Refresh runs out       RTC 4 months IOP check    She can try [facility] - close to home         Lilo Frias M.D., M.S.  Department of Ophthalmology   Division of Glaucoma Surgery  [facility]

## 2024-05-01 ENCOUNTER — OFFICE VISIT (OUTPATIENT)
Dept: OPHTHALMOLOGY | Facility: CLINIC | Age: 69
End: 2024-05-01
Payer: MEDICARE

## 2024-05-01 DIAGNOSIS — H35.721 RETINAL PIGMENT EPITHELIAL DETACHMENT, RIGHT: ICD-10-CM

## 2024-05-01 DIAGNOSIS — H04.123 DRY EYE SYNDROME, BILATERAL: ICD-10-CM

## 2024-05-01 DIAGNOSIS — H35.351 CYSTOID MACULAR EDEMA, RIGHT: ICD-10-CM

## 2024-05-01 DIAGNOSIS — H18.9 EXPOSURE KERATOPATHY: ICD-10-CM

## 2024-05-01 DIAGNOSIS — Z96.1 PSEUDOPHAKIA OF BOTH EYES: ICD-10-CM

## 2024-05-01 DIAGNOSIS — H44.2C3 BOTH EYES AFFECTED BY DEGENERATIVE MYOPIA WITH RETINAL DETACHMENT: ICD-10-CM

## 2024-05-01 DIAGNOSIS — H40.1133 PRIMARY OPEN ANGLE GLAUCOMA (POAG) OF BOTH EYES, SEVERE STAGE: Primary | ICD-10-CM

## 2024-05-01 DIAGNOSIS — M35.01 KERATITIS SICCA, BILATERAL: ICD-10-CM

## 2024-05-01 PROCEDURE — 99214 OFFICE O/P EST MOD 30 MIN: CPT | Mod: S$PBB,,, | Performed by: STUDENT IN AN ORGANIZED HEALTH CARE EDUCATION/TRAINING PROGRAM

## 2024-05-01 PROCEDURE — 99213 OFFICE O/P EST LOW 20 MIN: CPT | Mod: PBBFAC | Performed by: STUDENT IN AN ORGANIZED HEALTH CARE EDUCATION/TRAINING PROGRAM

## 2024-05-01 PROCEDURE — 99999 PR PBB SHADOW E&M-EST. PATIENT-LVL III: CPT | Mod: PBBFAC,,, | Performed by: STUDENT IN AN ORGANIZED HEALTH CARE EDUCATION/TRAINING PROGRAM

## 2024-05-01 PROCEDURE — 92133 CPTRZD OPH DX IMG PST SGM ON: CPT | Mod: PBBFAC | Performed by: STUDENT IN AN ORGANIZED HEALTH CARE EDUCATION/TRAINING PROGRAM

## 2024-05-01 RX ORDER — CYCLOSPORINE 0.5 MG/ML
1 EMULSION OPHTHALMIC EVERY 12 HOURS
Qty: 5.5 ML | Refills: 3 | Status: SHIPPED | OUTPATIENT
Start: 2024-05-01 | End: 2025-05-01

## 2024-05-01 RX ORDER — LIFITEGRAST 50 MG/ML
1 SOLUTION/ DROPS OPHTHALMIC 2 TIMES DAILY
Qty: 60 EACH | Refills: 3 | Status: SHIPPED | OUTPATIENT
Start: 2024-05-01

## 2024-05-16 ENCOUNTER — OFFICE VISIT (OUTPATIENT)
Dept: OPHTHALMOLOGY | Facility: CLINIC | Age: 69
End: 2024-05-16
Payer: MEDICARE

## 2024-05-16 DIAGNOSIS — Z96.1 PSEUDOPHAKIA OF BOTH EYES: ICD-10-CM

## 2024-05-16 DIAGNOSIS — H18.9 EXPOSURE KERATOPATHY: ICD-10-CM

## 2024-05-16 DIAGNOSIS — H40.1133 PRIMARY OPEN ANGLE GLAUCOMA (POAG) OF BOTH EYES, SEVERE STAGE: ICD-10-CM

## 2024-05-16 DIAGNOSIS — H35.721 RETINAL PIGMENT EPITHELIAL DETACHMENT, RIGHT: ICD-10-CM

## 2024-05-16 DIAGNOSIS — H35.351 CYSTOID MACULAR EDEMA, RIGHT: ICD-10-CM

## 2024-05-16 DIAGNOSIS — H44.2C3 BOTH EYES AFFECTED BY DEGENERATIVE MYOPIA WITH RETINAL DETACHMENT: Primary | ICD-10-CM

## 2024-05-16 DIAGNOSIS — H04.123 DRY EYE SYNDROME, BILATERAL: ICD-10-CM

## 2024-05-16 PROCEDURE — 99999 PR PBB SHADOW E&M-EST. PATIENT-LVL I: CPT | Mod: PBBFAC,,, | Performed by: OPHTHALMOLOGY

## 2024-05-16 PROCEDURE — 99214 OFFICE O/P EST MOD 30 MIN: CPT | Mod: S$PBB,,, | Performed by: OPHTHALMOLOGY

## 2024-05-16 PROCEDURE — 92134 CPTRZ OPH DX IMG PST SGM RTA: CPT | Mod: PBBFAC | Performed by: OPHTHALMOLOGY

## 2024-05-16 PROCEDURE — 99211 OFF/OP EST MAY X REQ PHY/QHP: CPT | Mod: PBBFAC,25 | Performed by: OPHTHALMOLOGY

## 2024-05-16 PROCEDURE — G2211 COMPLEX E/M VISIT ADD ON: HCPCS | Mod: S$PBB,,, | Performed by: OPHTHALMOLOGY

## 2024-05-16 RX ORDER — KETOROLAC TROMETHAMINE 5 MG/ML
1 SOLUTION OPHTHALMIC 4 TIMES DAILY
Qty: 10 ML | Refills: 6 | Status: SHIPPED | OUTPATIENT
Start: 2024-05-16 | End: 2025-05-16

## 2024-05-16 NOTE — PROGRESS NOTES
HPI    4-6 wk DFE/OCTm OU /Monitor IRF    DLS: 03/28/2024 by Dr. Guillermo Rendon MD    Pt here for 6 week DFE and OCT.      Blurred Va  Diplopia  --eye pain OU.   --flashes/floaters   --headaches   --curtain/shadows/veils    Ketorolac QD OD   Cosopt PF BID OS   Latanoprost QHS OS   Systane PRN     POHx:    1. 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 Cystoid macular edema, right   Hx myopic dege neration with RD, s/p repair OU   Retina flat and attached, prev was taken care of by Dr. David at       chronic CME OD (due to decentered IOL) has had inj and daily used acular   BID with significant dry eye symptoms      Today 10/19/2023  using Acular ever y other day, basically resolved IRF   with rare cyst noted, cornea with mild staining - better on every other   day Acualr usage      Plan: Given stability of IRF in better seeing eye and cornea is not   dramatically worse, continue Acular every other day OD on ly and recheck   in 4 mo      3. Retinal pigment epithelial detachment, right   Mild PED no heme   Plan: Observation dry no injection recommended no CNVM      4. Primary open angle glaucoma (POAG) of both eyes, severe stage   F/b Dr. Frias - recent clinic no te reviewed   IOP 5/11  - on cosopt   Plan: Continue cosopt BID OU      5. Pseudophakia of both eyes   Decentered IOL OD   OS stable   Plan: Observation      6. Exposure keratopathy   7. Dry eye syndrome, bilateral   S/p andrew canthoplasty and midface lift w Dr Chaudhari   Recommend frequent Ats due to ketorolac use   Warm compresses, lid hygiene     Last edited by Charlene Johnson MA on 5/16/2024  1:20 PM.            A/P    ICD-10-CM ICD-9-CM   1. 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 Cystoid macular edema, right  H35.351 362.53   3. Retinal pigment epithelial detachment, right  H35.721 362.42   4. Primary open angle glaucoma (POAG) of both eyes, severe stage  H40.1133 365.11      365.73   5. Pseudophakia of both eyes  Z96.1 V43.1   6. Exposure keratopathy  H18.9 371.40   7. Dry eye syndrome, bilateral  H04.123 375.15       1. 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 Cystoid macular edema, right  Hx myopic degeneration with RD, s/p repair OU  Retina flat and attached, prev was taken care of by Dr. David at     Chronic CME OD (due to decentered IOL) has had inj and daily used acular BID with significant dry eye symptoms    Today 5/16/2024  using Acular BID with now improving IRF/SRF    Plan: Given still with IRF/SRF, will continue Acular to BID and recheck in 3-4 mo    Visit today is associated with current or anticipated ongoing medical care related to this patients single serious condition/complex condition (cystoid macular edema right eye impacting vision, subluxed lens right eye )     3. Retinal pigment epithelial detachment, right  Mild PED no heme   Plan: Observation dry no injection recommended no CNVM     4. Primary open angle glaucoma (POAG) of both eyes, severe stage  F/b Dr. Frias - recent clinic note reviewed  IOP 7/12  - on cosopt OS, latan qHS OS  Plan: Continue cosopt OS, latan qHS OS    5. Pseudophakia of both eyes  Decentered IOL OD (pt had CPAP mask with magnet, has been possibly associated with affecting older IOL with metallic components and malpositioning)  OS stable  Plan: Observation for now     6. Exposure keratopathy  7. Dry eye syndrome, bilateral  S/p andrew canthoplasty and midface lift w Dr Chaudhari  Recommend frequent Ats due to ketorolac   Warm compresses, lid hygiene     RTC 3-4 mo DFE/OCTm OU, monitor IRF        I saw and examined the patient and reviewed in detail the findings documented. The final examination findings, image interpretations, and plan as documented in the record represent my personal judgment and conclusions.    Guillermo Rendon MD  Vitreoretinal Surgery   [facility]

## 2024-09-19 ENCOUNTER — OFFICE VISIT (OUTPATIENT)
Dept: OPHTHALMOLOGY | Facility: CLINIC | Age: 69
End: 2024-09-19
Payer: MEDICARE

## 2024-09-19 ENCOUNTER — CLINICAL SUPPORT (OUTPATIENT)
Dept: OPHTHALMOLOGY | Facility: CLINIC | Age: 69
End: 2024-09-19
Payer: MEDICARE

## 2024-09-19 DIAGNOSIS — H44.2C3 BOTH EYES AFFECTED BY DEGENERATIVE MYOPIA WITH RETINAL DETACHMENT: Primary | ICD-10-CM

## 2024-09-19 DIAGNOSIS — Z96.1 PSEUDOPHAKIA OF BOTH EYES: ICD-10-CM

## 2024-09-19 DIAGNOSIS — H04.123 DRY EYE SYNDROME, BILATERAL: ICD-10-CM

## 2024-09-19 DIAGNOSIS — H40.1133 PRIMARY OPEN ANGLE GLAUCOMA (POAG) OF BOTH EYES, SEVERE STAGE: ICD-10-CM

## 2024-09-19 DIAGNOSIS — H35.351 CYSTOID MACULAR EDEMA, RIGHT: ICD-10-CM

## 2024-09-19 DIAGNOSIS — H18.9 EXPOSURE KERATOPATHY: ICD-10-CM

## 2024-09-19 DIAGNOSIS — H35.721 RETINAL PIGMENT EPITHELIAL DETACHMENT, RIGHT: ICD-10-CM

## 2024-09-19 PROCEDURE — 92134 CPTRZ OPH DX IMG PST SGM RTA: CPT | Mod: PBBFAC | Performed by: OPHTHALMOLOGY

## 2024-09-19 PROCEDURE — 99211 OFF/OP EST MAY X REQ PHY/QHP: CPT | Mod: PBBFAC,25 | Performed by: OPHTHALMOLOGY

## 2024-09-19 PROCEDURE — 99214 OFFICE O/P EST MOD 30 MIN: CPT | Mod: S$PBB,,, | Performed by: OPHTHALMOLOGY

## 2024-09-19 PROCEDURE — 99999 PR PBB SHADOW E&M-EST. PATIENT-LVL I: CPT | Mod: PBBFAC,,, | Performed by: OPHTHALMOLOGY

## 2024-09-19 PROCEDURE — G2211 COMPLEX E/M VISIT ADD ON: HCPCS | Mod: S$PBB,,, | Performed by: OPHTHALMOLOGY

## 2024-09-19 RX ORDER — BRIMONIDINE TARTRATE 2 MG/ML
1 SOLUTION/ DROPS OPHTHALMIC 3 TIMES DAILY
Qty: 5 ML | Refills: 3 | Status: SHIPPED | OUTPATIENT
Start: 2024-09-19 | End: 2025-09-19

## 2024-09-19 NOTE — PROGRESS NOTES
HPI    Here for 3 month DFE/OCT    DLS: Dr Gasca 8-29-24    Eye meds: Cosopt BID OS and Latan QHS OS    69 year old female states vision blurry OU. Denies flashes and floaters.   Denies ocular pain.   Last edited by Merlene Parra on 9/19/2024  1:37 PM.             A/P    ICD-10-CM ICD-9-CM   1. 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 Cystoid macular edema, right  H35.351 362.53   3. Retinal pigment epithelial detachment, right  H35.721 362.42   4. Primary open angle glaucoma (POAG) of both eyes, severe stage  H40.1133 365.11     365.73   5. Pseudophakia of both eyes  Z96.1 V43.1   6. Exposure keratopathy  H18.9 371.40   7. Dry eye syndrome, bilateral  H04.123 375.15         1. 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 Cystoid macular edema, right  Hx myopic degeneration with RD, s/p repair OU  Retina flat and attached, prev was taken care of by Dr. David at     Chronic CME OD (due to decentered IOL) has had inj and daily used acular BID with significant dry eye symptoms    Today 9/19/2024  using Acular BID - improving IRF/SRF    Plan: Given still with IRF/SRF, will continue Acular to BID and recheck in 3-4 mo at [facility] (closer for patient)   Pt could not afford prolensa or ilevro    Visit today is associated with current or anticipated ongoing medical care related to this patients single serious condition/complex condition (cystoid macular edema right eye impacting vision, subluxed lens right eye )     3. Retinal pigment epithelial detachment, right  Mild PED no heme   Plan: Observation dry no injection recommended no CNVM     4. Primary open angle glaucoma (POAG) of both eyes, severe stage  Seen by team at [facility] recently - Recent notes reviewed   IOP 10/29  - on cosopt OS, latan qHS OS  Plan: Continue cosopt OS, latan qHS OS, add brim TID OS    5. Pseudophakia of both eyes  Decentered IOL OD (pt had CPAP mask with magnet, has  been possibly associated with affecting older IOL with metallic components and malpositioning)  OS stable  Plan: Observation for now     6. Exposure keratopathy  7. Dry eye syndrome, bilateral  S/p andrew canthoplasty and midface lift w Dr Chaudhari  Recommend frequent Ats due to ketorolac   Warm compresses, lid hygiene     RTC [place] 3-4 mo for DFE/OCTm OU  RTC [facility] for [facility] f/u in Oct        I saw and examined the patient and reviewed in detail the findings documented. The final examination findings, image interpretations, and plan as documented in the record represent my personal judgment and conclusions.    Guillermo Rendon MD  Vitreoretinal Surgery   [facility]

## 2024-11-13 PROBLEM — I10 HTN (HYPERTENSION): Status: ACTIVE | Noted: 2024-11-13

## 2024-11-13 PROBLEM — R42 VERTIGO: Status: ACTIVE | Noted: 2024-11-13

## 2024-11-13 PROBLEM — I50.30 (HFPEF) HEART FAILURE WITH PRESERVED EJECTION FRACTION: Status: ACTIVE | Noted: 2024-11-13

## 2024-11-14 PROBLEM — H81.10 BPPV (BENIGN PAROXYSMAL POSITIONAL VERTIGO): Status: RESOLVED | Noted: 2024-11-13 | Resolved: 2024-11-14

## 2024-11-14 PROBLEM — H81.10 BPPV (BENIGN PAROXYSMAL POSITIONAL VERTIGO): Status: ACTIVE | Noted: 2024-11-13

## 2024-12-12 PROBLEM — H54.10 BLINDNESS AND LOW VISION: Status: ACTIVE | Noted: 2024-12-12

## 2025-01-07 RX ORDER — BRIMONIDINE TARTRATE 2 MG/ML
1 SOLUTION/ DROPS OPHTHALMIC 3 TIMES DAILY
Qty: 15 ML | Refills: 11 | Status: SHIPPED | OUTPATIENT
Start: 2025-01-07

## 2025-01-24 RX ORDER — KETOROLAC TROMETHAMINE 5 MG/ML
1 SOLUTION OPHTHALMIC 4 TIMES DAILY
Qty: 10 ML | Refills: 6 | Status: SHIPPED | OUTPATIENT
Start: 2025-01-24 | End: 2026-01-24

## 2025-04-29 RX ORDER — TACROLIMUS 0.5 MG/1
0.5 CAPSULE ORAL EVERY 12 HOURS
Qty: 60 CAPSULE | Refills: 0 | Status: CANCELLED | OUTPATIENT
Start: 2025-04-29

## 2025-05-08 ENCOUNTER — CLINICAL SUPPORT (OUTPATIENT)
Dept: OPHTHALMOLOGY | Facility: CLINIC | Age: 70
End: 2025-05-08
Payer: MEDICARE

## 2025-05-08 ENCOUNTER — OFFICE VISIT (OUTPATIENT)
Dept: OPHTHALMOLOGY | Facility: CLINIC | Age: 70
End: 2025-05-08
Payer: MEDICARE

## 2025-05-08 DIAGNOSIS — H04.123 DRY EYE SYNDROME, BILATERAL: ICD-10-CM

## 2025-05-08 DIAGNOSIS — H35.721 RETINAL PIGMENT EPITHELIAL DETACHMENT, RIGHT: ICD-10-CM

## 2025-05-08 DIAGNOSIS — H40.1133 PRIMARY OPEN ANGLE GLAUCOMA (POAG) OF BOTH EYES, SEVERE STAGE: ICD-10-CM

## 2025-05-08 DIAGNOSIS — H40.1123 PRIMARY OPEN ANGLE GLAUCOMA (POAG) OF LEFT EYE, SEVERE STAGE: ICD-10-CM

## 2025-05-08 DIAGNOSIS — H18.9 EXPOSURE KERATOPATHY: ICD-10-CM

## 2025-05-08 DIAGNOSIS — H35.351 CYSTOID MACULAR EDEMA, RIGHT: Primary | ICD-10-CM

## 2025-05-08 DIAGNOSIS — Z96.1 PSEUDOPHAKIA OF BOTH EYES: ICD-10-CM

## 2025-05-08 DIAGNOSIS — H44.2C3 BOTH EYES AFFECTED BY DEGENERATIVE MYOPIA WITH RETINAL DETACHMENT: ICD-10-CM

## 2025-05-08 PROCEDURE — G2211 COMPLEX E/M VISIT ADD ON: HCPCS | Mod: S$PBB,,, | Performed by: OPHTHALMOLOGY

## 2025-05-08 PROCEDURE — 99999 PR PBB SHADOW E&M-EST. PATIENT-LVL III: CPT | Mod: PBBFAC,,, | Performed by: OPHTHALMOLOGY

## 2025-05-08 PROCEDURE — 99213 OFFICE O/P EST LOW 20 MIN: CPT | Mod: PBBFAC,25 | Performed by: OPHTHALMOLOGY

## 2025-05-08 PROCEDURE — 92134 CPTRZ OPH DX IMG PST SGM RTA: CPT | Mod: PBBFAC | Performed by: OPHTHALMOLOGY

## 2025-05-08 PROCEDURE — 99214 OFFICE O/P EST MOD 30 MIN: CPT | Mod: S$PBB,,, | Performed by: OPHTHALMOLOGY

## 2025-05-08 RX ORDER — KETOROLAC TROMETHAMINE 5 MG/ML
1 SOLUTION OPHTHALMIC 4 TIMES DAILY
Qty: 10 ML | Refills: 6 | Status: SHIPPED | OUTPATIENT
Start: 2025-05-08 | End: 2026-05-08

## 2025-05-08 RX ORDER — LATANOPROST 50 UG/ML
1 SOLUTION/ DROPS OPHTHALMIC NIGHTLY
Qty: 2.5 ML | Refills: 6 | Status: SHIPPED | OUTPATIENT
Start: 2025-05-08

## 2025-05-08 RX ORDER — DORZOLAMIDE HYDROCHLORIDE AND TIMOLOL MALEATE 20; 5 MG/ML; MG/ML
1 SOLUTION/ DROPS OPHTHALMIC 2 TIMES DAILY
Qty: 10 ML | Refills: 6 | Status: SHIPPED | OUTPATIENT
Start: 2025-05-08

## 2025-05-08 RX ORDER — BRIMONIDINE TARTRATE 2 MG/ML
1 SOLUTION/ DROPS OPHTHALMIC 3 TIMES DAILY
Qty: 15 ML | Refills: 11 | Status: SHIPPED | OUTPATIENT
Start: 2025-05-08

## 2025-05-08 NOTE — PROGRESS NOTES
HPI    Pt. Presents for 4 month follow up.     Pt. States VA has remained the same since last visit. No changes. Pt. Does   need rx's refilled if possible.   Denies F/F/Pain.  Eye meds: Cosopt BID OS                   Brimonidine QID OS                    Latanoprost QHS OS                    Ketorolac BID OD                     Systane QID (PRN) OU  Last edited by Juma Cervantes MA on 5/8/2025 10:49 AM.           A/P    ICD-10-CM ICD-9-CM   1. Cystoid macular edema, right  H35.351 362.53   2. 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 Retinal pigment epithelial detachment, right  H35.721 362.42   4. Primary open angle glaucoma (POAG) of both eyes, severe stage  H40.1133 365.11     365.73   5. Pseudophakia of both eyes  Z96.1 V43.1   6. Exposure keratopathy  H18.9 371.40   7. Dry eye syndrome, bilateral  H04.123 375.15   8. Primary open angle glaucoma (POAG) of left eye, severe stage  H40.1123 365.11     365.73       1. 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 Cystoid macular edema, right  Hx myopic degeneration with RD, s/p repair OU    Saw team at [facility] in Feb, doing well since but wants to follow up here    Chronic CME OD (due to decentered IOL) has had inj and daily used acular BID with significant dry eye symptoms    Today 5/8/2025  using Acular BID - improving IRF today compared to OCT from [facility] in Feb     Plan: Given still with IRF, will continue Acular to BID and recheck in 3-4 mo    Pt could not afford prolensa or ilevro    Visit today is associated with current or anticipated ongoing medical care related to this patients single serious condition/complex condition (cystoid macular edema right eye impacting vision, subluxed lens right eye )     3. Retinal pigment epithelial detachment, right  Mild PED no heme   Plan: Observation dry no injection recommended no CNVM     4. Primary open angle glaucoma (POAG) of both  eyes, severe stage  Seen by team at [facility] - Recent notes reviewed   IOP 12/20  - on cosopt OS, latan qHS OS  Plan: Continue cosopt OS, latan qHS OS, brim TID OS - drops refilled     5. Pseudophakia of both eyes  Decentered IOL OD (pt had CPAP mask with magnet, has been possibly associated with affecting older IOL with metallic components and malpositioning)  OS stable  Plan: Observation     6. Exposure keratopathy  7. Dry eye syndrome, bilateral  S/p andrew canthoplasty and midface lift w Dr Chaudhari  Recommend frequent Ats due to ketorolac   Warm compresses, lid hygiene     RTC [place] 3-4 mo for DFE/OCTm OU         I saw and examined the patient and reviewed in detail the findings documented. The final examination findings, image interpretations, and plan as documented in the record represent my personal judgment and conclusions.    Guillermo Rendon MD  Vitreoretinal Surgery   [facility]

## 2025-06-19 RX ORDER — KETOROLAC TROMETHAMINE 5 MG/ML
1 SOLUTION OPHTHALMIC 2 TIMES DAILY
Qty: 5 ML | Refills: 6 | Status: SHIPPED | OUTPATIENT
Start: 2025-06-19

## 2025-08-14 ENCOUNTER — CLINICAL SUPPORT (OUTPATIENT)
Dept: OPHTHALMOLOGY | Facility: CLINIC | Age: 70
End: 2025-08-14
Payer: MEDICARE

## 2025-08-14 ENCOUNTER — OFFICE VISIT (OUTPATIENT)
Dept: OPHTHALMOLOGY | Facility: CLINIC | Age: 70
End: 2025-08-14
Payer: MEDICARE

## 2025-08-14 DIAGNOSIS — H35.351 CYSTOID MACULAR EDEMA, RIGHT: ICD-10-CM

## 2025-08-14 DIAGNOSIS — H35.721 RETINAL PIGMENT EPITHELIAL DETACHMENT, RIGHT: ICD-10-CM

## 2025-08-14 DIAGNOSIS — H04.123 DRY EYE SYNDROME, BILATERAL: ICD-10-CM

## 2025-08-14 DIAGNOSIS — H18.9 EXPOSURE KERATOPATHY: ICD-10-CM

## 2025-08-14 DIAGNOSIS — H40.1133 PRIMARY OPEN ANGLE GLAUCOMA (POAG) OF BOTH EYES, SEVERE STAGE: ICD-10-CM

## 2025-08-14 DIAGNOSIS — H44.2C3 BOTH EYES AFFECTED BY DEGENERATIVE MYOPIA WITH RETINAL DETACHMENT: Primary | ICD-10-CM

## 2025-08-14 PROCEDURE — 99214 OFFICE O/P EST MOD 30 MIN: CPT | Mod: S$PBB,,, | Performed by: OPHTHALMOLOGY

## 2025-08-14 PROCEDURE — 99999 PR PBB SHADOW E&M-EST. PATIENT-LVL III: CPT | Mod: PBBFAC,,, | Performed by: OPHTHALMOLOGY

## 2025-08-14 PROCEDURE — 99213 OFFICE O/P EST LOW 20 MIN: CPT | Mod: PBBFAC | Performed by: OPHTHALMOLOGY

## 2025-08-14 PROCEDURE — 92134 CPTRZ OPH DX IMG PST SGM RTA: CPT | Mod: PBBFAC | Performed by: OPHTHALMOLOGY

## 2025-08-14 PROCEDURE — G2211 COMPLEX E/M VISIT ADD ON: HCPCS | Mod: ,,, | Performed by: OPHTHALMOLOGY

## (undated) DEVICE — SUT VICRYL PLUS 3-0 SH 18IN

## (undated) DEVICE — DRAPE CORETEMP FLD WRM 56X62IN

## (undated) DEVICE — DRESSING EYE OVAL LF

## (undated) DEVICE — NDL 22GA X1 1/2 REG BEVEL

## (undated) DEVICE — CLIP MED TICALL

## (undated) DEVICE — DROPPER MEDICINE

## (undated) DEVICE — SUT 2-0 12-18IN SILK

## (undated) DEVICE — DRAIN BLAKE HUBLS 10F RD

## (undated) DEVICE — NDL STRAIGHT 4CM LEIBINGER

## (undated) DEVICE — SKINMARKER & RULER REGULAR X-F

## (undated) DEVICE — SEE MEDLINE ITEM 154981

## (undated) DEVICE — SUT VICRYL 4-0 RB1 27IN UD

## (undated) DEVICE — DRAPE STERI-DRAPE 1000 17X11IN

## (undated) DEVICE — ELECTRODE NDL

## (undated) DEVICE — Device

## (undated) DEVICE — TOWEL OR DISP STRL BLUE 4/PK

## (undated) DEVICE — SOL BETADINE 5%

## (undated) DEVICE — BOWL STERILE LARGE 32OZ

## (undated) DEVICE — GOWN SURGICAL X-LARGE

## (undated) DEVICE — BLADE SURG #15 CARBON STEEL

## (undated) DEVICE — SUT ETHILON 3-0 PS2 18 BLK

## (undated) DEVICE — SEE MEDLINE ITEM 157128

## (undated) DEVICE — DRAPE INCISE IOBAN 2 23X17IN

## (undated) DEVICE — PROTECTOR CORNEAL CROUCH ST

## (undated) DEVICE — SUT VICRYL 3-0 27 SH

## (undated) DEVICE — GAUZE SPONGE PEANUT STRL

## (undated) DEVICE — SUT 3-0 12-18IN SILK

## (undated) DEVICE — SPONGE GAUZE 16PLY 4X4

## (undated) DEVICE — NDL HYPO REG 25G X 1 1/2

## (undated) DEVICE — ADHESIVE DERMABOND ADVANCED

## (undated) DEVICE — INSTRUMENT SURG SUCT FRZR W/C

## (undated) DEVICE — APPLICATOR STERILE 3IN

## (undated) DEVICE — CONTAINER SPECIMEN STRL 4OZ

## (undated) DEVICE — SUT MONOCRYL 4-0 PS-2

## (undated) DEVICE — PAD EYE OVAL CNTOUR 1.62X2.62

## (undated) DEVICE — PENCIL ROCKER SWITCH 10FT CORD

## (undated) DEVICE — HOOK LONE STAR BLUNT 12MM

## (undated) DEVICE — ELECTRODE REM PLYHSV RETURN 9

## (undated) DEVICE — SEE MEDLINE ITEM 157194

## (undated) DEVICE — TRAY MINOR GEN SURG

## (undated) DEVICE — STIMULATOR NRVE MINI VARI-STIM

## (undated) DEVICE — SUT CTD VICRYL 5-0 P-2 UNDB

## (undated) DEVICE — PROTECTOR CORNEAL CROUCH PED

## (undated) DEVICE — DRAPE STERI INSTRUMENT 1018

## (undated) DEVICE — SOL BSS BALANCED SALT

## (undated) DEVICE — TRAY MUSCLE LID EYE

## (undated) DEVICE — CLEANER TIP ELECSURG 2X2IN

## (undated) DEVICE — GAUZE SPONGE 4X4 12PLY

## (undated) DEVICE — SYR 10CC LUER LOCK

## (undated) DEVICE — DRAPE EENT SPLIT STERILE

## (undated) DEVICE — SUT LIGACLIP SMALL XTRA

## (undated) DEVICE — ELECTRODE BLADE INSULATED 1 IN

## (undated) DEVICE — CUP MEDICINE GRADUATED 1OZ

## (undated) DEVICE — SOL WATER STRL IRR 1000ML

## (undated) DEVICE — SYR 5CC LUER LOCK W/O NDL

## (undated) DEVICE — NDL HYPO A BEVEL 30X1/2

## (undated) DEVICE — SUT PROLENE 6-0 P-1 18

## (undated) DEVICE — TAPE SURG TRANSPORE 1 SNG USE

## (undated) DEVICE — SHEET EENT SPLIT

## (undated) DEVICE — TRAY FOLEY 16FR INFECTION CONT

## (undated) DEVICE — CORD BIPOLAR 12 FOOT